# Patient Record
Sex: FEMALE | Race: WHITE | NOT HISPANIC OR LATINO | Employment: UNEMPLOYED | URBAN - METROPOLITAN AREA
[De-identification: names, ages, dates, MRNs, and addresses within clinical notes are randomized per-mention and may not be internally consistent; named-entity substitution may affect disease eponyms.]

---

## 2017-01-06 ENCOUNTER — OFFICE VISIT (OUTPATIENT)
Dept: OBSTETRICS AND GYNECOLOGY | Facility: CLINIC | Age: 22
End: 2017-01-06

## 2017-01-06 VITALS
WEIGHT: 139 LBS | SYSTOLIC BLOOD PRESSURE: 106 MMHG | RESPIRATION RATE: 14 BRPM | BODY MASS INDEX: 25.42 KG/M2 | DIASTOLIC BLOOD PRESSURE: 66 MMHG

## 2017-01-06 DIAGNOSIS — Z98.890 POST-OPERATIVE STATE: Primary | ICD-10-CM

## 2017-01-06 PROCEDURE — 99024 POSTOP FOLLOW-UP VISIT: CPT | Performed by: OBSTETRICS & GYNECOLOGY

## 2017-01-06 NOTE — PROGRESS NOTES
Subjective   Chief Complaint   Patient presents with   • Post-op     Tatianna Dillon is a 21 y.o. year old  presenting to be seen for her post-operative visit.  Currently she reports no problems with eating, bowel movements, voiding, or wound drainage and pain is well controlled.    There was no pathology result associated with Tatianna's recent procedure.      OTHER COMPLAINTS:  none    The following portions of the patient's history were reviewed and updated as appropriate:problem list, current medications, allergies and past surgical history       Objective   Visit Vitals   • /66   • Resp 14   • Wt 139 lb (63 kg)   • LMP 2017 (Exact Date)   • BMI 25.42 kg/m2       General:  well developed; well nourished  no acute distress   Abdomen: soft, non-tender; no masses  no umbilical or inginual hernias are present  no hepato-splenomegaly  incision is clean, dry, intact and without drainage   Pelvis: Not performed.          Assessment   1. S/P laparoscopy and lysis of adhesions - suspect prior ASx chlamydia     Plan   1. May return to full activity with no restrictions  2. Meds were prescribed - no  3. Follow up PRN or 2017 for annual exam       This note was electronically signed.    Chacorta Romero M.D.  2017

## 2017-01-06 NOTE — MR AVS SNAPSHOT
Tatianna Dillon   2017 9:10 AM   Office Visit    Dept Phone:  [phone]   Encounter #:  [id number]    Provider:  Chacorta Romero MD   Department:  [facility]                Your Full Care Plan              Your Updated Medication List          This list is accurate as of: 17 10:09 AM.  Always use your most recent med list.                amphetamine-dextroamphetamine 10 MG tablet   Commonly known as:  ADDERALL               You Were Diagnosed With        Codes Comments    Post-operative state    -  Primary ICD-10-CM: Z98.890  ICD-9-CM: V45.89       Instructions     None    Patient Instructions History      Upcoming Appointments     Visit Type Date Time Department    POST-OP 2017  9:10 AM [facility]    [facility]      MyChart Signup     [facility] [employer] allows you to send messages to your doctor, view your test results, renew your prescriptions, schedule appointments, and more. To sign up, go to [employer] and click on the Sign Up Now link in the New User? box. Enter your [employer] Activation Code exactly as it appears below along with the last four digits of your Social Security Number and your Date of Birth () to complete the sign-up process. If you do not sign up before the expiration date, you must request a new code.    [employer] Activation Code: [email or web address]  Expires: 2017 10:09 AM    If you have questions, you can email [email or web address] or call [phone] to talk to our [employer] staff. Remember, [employer] is NOT to be used for urgent needs. For medical emergencies, dial 911.               Other Info from Your Visit           Your Appointments     Nov 15, 2017  9:10 AM EST   Annual with Chacorta Romero MD   [facility] (--)    [address] [address]      [phone]              Allergies     No Known Allergies      Reason for Visit     Post-op           Vital Signs     Blood Pressure Respirations Weight Last Menstrual Period Body Mass Index Smoking Status    106/66 14 139 lb (63 kg) 01/03/2017 (Exact Date) 25.42 kg/m2 Smoker, Current Status Unknown      Problems and Diagnoses Noted     Postoperative state    -  Primary      No Longer an Issue     Menorrhagia with regular cycle

## 2017-01-30 ENCOUNTER — TELEPHONE (OUTPATIENT)
Dept: OBSTETRICS AND GYNECOLOGY | Facility: CLINIC | Age: 22
End: 2017-01-30

## 2017-01-30 ENCOUNTER — LAB (OUTPATIENT)
Dept: LAB | Facility: HOSPITAL | Age: 22
End: 2017-01-30

## 2017-01-30 DIAGNOSIS — N91.2 AMENORRHEA: Primary | ICD-10-CM

## 2017-01-30 DIAGNOSIS — O20.0 THREATENED MISCARRIAGE: Primary | ICD-10-CM

## 2017-01-30 DIAGNOSIS — N91.2 AMENORRHEA: ICD-10-CM

## 2017-01-30 LAB — HCG INTACT+B SERPL-ACNC: 8 MIU/ML

## 2017-01-30 PROCEDURE — 36415 COLL VENOUS BLD VENIPUNCTURE: CPT

## 2017-01-30 PROCEDURE — 84702 CHORIONIC GONADOTROPIN TEST: CPT | Performed by: OBSTETRICS & GYNECOLOGY

## 2017-01-30 NOTE — TELEPHONE ENCOUNTER
----- Message from Coretta Calvin sent at 1/30/2017 11:25 AM EST -----  Regarding: Beta HCG order  Contact: [phone]  Pt called earlier regarding orders to come to lab for Beta HCG today due to high risk of tubal pregnancy,  she is on her way to  lab now and I didn't see order placed yet.     [phone] pt states LMP 01/04/17 had 2 positive pregnancy test yesterday and a negative test on Saturday.  Requesting blood work. Beta HCG ordered

## 2017-02-02 ENCOUNTER — TELEPHONE (OUTPATIENT)
Dept: OBSTETRICS AND GYNECOLOGY | Facility: CLINIC | Age: 22
End: 2017-02-02

## 2017-02-02 ENCOUNTER — LAB (OUTPATIENT)
Dept: LAB | Facility: HOSPITAL | Age: 22
End: 2017-02-02

## 2017-02-02 DIAGNOSIS — O20.0 THREATENED MISCARRIAGE: Primary | ICD-10-CM

## 2017-02-02 DIAGNOSIS — O20.0 THREATENED MISCARRIAGE: ICD-10-CM

## 2017-02-02 LAB — HCG INTACT+B SERPL-ACNC: 25 MIU/ML

## 2017-02-02 PROCEDURE — 36415 COLL VENOUS BLD VENIPUNCTURE: CPT

## 2017-02-02 PROCEDURE — 84702 CHORIONIC GONADOTROPIN TEST: CPT | Performed by: OBSTETRICS & GYNECOLOGY

## 2017-02-02 NOTE — TELEPHONE ENCOUNTER
Spoke with the patient.  She is only one day past her last menstrual period.  Given her prior history of pelvic adhesive disease, I think we must be careful about ectopic pregnancy.  I've asked her to come back next Monday or Tuesday to repeat her hCG levels.  Assuming these are rising nicely hopefully an ultrasound in about 2 weeks we'll see evidence of an intrauterine pregnancy.  If she has issues with pain or bleeding, I've asked her to contact us immediately so further workup could be done.

## 2017-02-06 ENCOUNTER — APPOINTMENT (OUTPATIENT)
Dept: ULTRASOUND IMAGING | Facility: HOSPITAL | Age: 22
End: 2017-02-06

## 2017-02-06 ENCOUNTER — HOSPITAL ENCOUNTER (EMERGENCY)
Facility: HOSPITAL | Age: 22
Discharge: HOME | End: 2017-02-06
Attending: EMERGENCY MEDICINE | Admitting: EMERGENCY MEDICINE

## 2017-02-06 VITALS
BODY MASS INDEX: 24.84 KG/M2 | SYSTOLIC BLOOD PRESSURE: 98 MMHG | OXYGEN SATURATION: 100 % | HEART RATE: 63 BPM | RESPIRATION RATE: 16 BRPM | DIASTOLIC BLOOD PRESSURE: 62 MMHG | TEMPERATURE: 98.7 F | HEIGHT: 62 IN | WEIGHT: 135 LBS

## 2017-02-06 DIAGNOSIS — R10.32 ACUTE ABDOMINAL PAIN IN LEFT LOWER QUADRANT: Primary | ICD-10-CM

## 2017-02-06 DIAGNOSIS — R31.9 HEMATURIA: ICD-10-CM

## 2017-02-06 DIAGNOSIS — Z3A.01 LESS THAN 8 WEEKS GESTATION OF PREGNANCY: ICD-10-CM

## 2017-02-06 LAB
ALBUMIN SERPL-MCNC: 4.3 G/DL (ref 3.2–4.8)
ALBUMIN/GLOB SERPL: 1.8 G/DL (ref 1.5–2.5)
ALP SERPL-CCNC: 88 U/L (ref 25–100)
ALT SERPL W P-5'-P-CCNC: 20 U/L (ref 7–40)
ANION GAP SERPL CALCULATED.3IONS-SCNC: 3 MMOL/L (ref 3–11)
AST SERPL-CCNC: 22 U/L (ref 0–33)
B-HCG UR QL: NEGATIVE
BACTERIA UR QL AUTO: ABNORMAL /HPF
BASOPHILS # BLD AUTO: 0.03 10*3/MM3 (ref 0–0.2)
BASOPHILS NFR BLD AUTO: 0.4 % (ref 0–1)
BILIRUB SERPL-MCNC: 0.4 MG/DL (ref 0.3–1.2)
BILIRUB UR QL STRIP: NEGATIVE
BUN BLD-MCNC: 13 MG/DL (ref 9–23)
BUN/CREAT SERPL: 16.3 (ref 7–25)
CALCIUM SPEC-SCNC: 9.4 MG/DL (ref 8.7–10.4)
CHLORIDE SERPL-SCNC: 108 MMOL/L (ref 99–109)
CLARITY UR: ABNORMAL
CO2 SERPL-SCNC: 28 MMOL/L (ref 20–31)
COLOR UR: YELLOW
CREAT BLD-MCNC: 0.8 MG/DL (ref 0.6–1.3)
DEPRECATED RDW RBC AUTO: 42.8 FL (ref 37–54)
EOSINOPHIL # BLD AUTO: 0.11 10*3/MM3 (ref 0.1–0.3)
EOSINOPHIL NFR BLD AUTO: 1.6 % (ref 0–3)
ERYTHROCYTE [DISTWIDTH] IN BLOOD BY AUTOMATED COUNT: 12.4 % (ref 11.3–14.5)
GFR SERPL CREATININE-BSD FRML MDRD: 89 ML/MIN/1.73
GLOBULIN UR ELPH-MCNC: 2.4 GM/DL
GLUCOSE BLD-MCNC: 90 MG/DL (ref 70–100)
GLUCOSE UR STRIP-MCNC: NEGATIVE MG/DL
HCG INTACT+B SERPL-ACNC: 75 MIU/ML
HCT VFR BLD AUTO: 39.6 % (ref 34.5–44)
HGB BLD-MCNC: 13.1 G/DL (ref 11.5–15.5)
HGB UR QL STRIP.AUTO: ABNORMAL
HOLD SPECIMEN: NORMAL
HOLD SPECIMEN: NORMAL
HYALINE CASTS UR QL AUTO: ABNORMAL /LPF
IMM GRANULOCYTES # BLD: 0.01 10*3/MM3 (ref 0–0.03)
IMM GRANULOCYTES NFR BLD: 0.1 % (ref 0–0.6)
INTERNAL NEGATIVE CONTROL: NEGATIVE
INTERNAL POSITIVE CONTROL: POSITIVE
KETONES UR QL STRIP: NEGATIVE
LEUKOCYTE ESTERASE UR QL STRIP.AUTO: ABNORMAL
LIPASE SERPL-CCNC: 42 U/L (ref 6–51)
LYMPHOCYTES # BLD AUTO: 2.41 10*3/MM3 (ref 0.6–4.8)
LYMPHOCYTES NFR BLD AUTO: 34.6 % (ref 24–44)
Lab: NORMAL
MCH RBC QN AUTO: 31.3 PG (ref 27–31)
MCHC RBC AUTO-ENTMCNC: 33.1 G/DL (ref 32–36)
MCV RBC AUTO: 94.5 FL (ref 80–99)
MONOCYTES # BLD AUTO: 0.81 10*3/MM3 (ref 0–1)
MONOCYTES NFR BLD AUTO: 11.6 % (ref 0–12)
NEUTROPHILS # BLD AUTO: 3.59 10*3/MM3 (ref 1.5–8.3)
NEUTROPHILS NFR BLD AUTO: 51.7 % (ref 41–71)
NITRITE UR QL STRIP: NEGATIVE
PH UR STRIP.AUTO: 5.5 [PH] (ref 5–8)
PLATELET # BLD AUTO: 215 10*3/MM3 (ref 150–450)
PMV BLD AUTO: 11 FL (ref 6–12)
POTASSIUM BLD-SCNC: 3.8 MMOL/L (ref 3.5–5.5)
PROT SERPL-MCNC: 6.7 G/DL (ref 5.7–8.2)
PROT UR QL STRIP: NEGATIVE
RBC # BLD AUTO: 4.19 10*6/MM3 (ref 3.89–5.14)
RBC # UR: ABNORMAL /HPF
REF LAB TEST METHOD: ABNORMAL
SODIUM BLD-SCNC: 139 MMOL/L (ref 132–146)
SP GR UR STRIP: 1.02 (ref 1–1.03)
SQUAMOUS #/AREA URNS HPF: ABNORMAL /HPF
UROBILINOGEN UR QL STRIP: ABNORMAL
WBC NRBC COR # BLD: 6.96 10*3/MM3 (ref 3.5–10.8)
WBC UR QL AUTO: ABNORMAL /HPF
WHOLE BLOOD HOLD SPECIMEN: NORMAL
WHOLE BLOOD HOLD SPECIMEN: NORMAL

## 2017-02-06 PROCEDURE — 76775 US EXAM ABDO BACK WALL LIM: CPT

## 2017-02-06 PROCEDURE — 99284 EMERGENCY DEPT VISIT MOD MDM: CPT

## 2017-02-06 PROCEDURE — 81001 URINALYSIS AUTO W/SCOPE: CPT | Performed by: EMERGENCY MEDICINE

## 2017-02-06 PROCEDURE — 80053 COMPREHEN METABOLIC PANEL: CPT | Performed by: EMERGENCY MEDICINE

## 2017-02-06 PROCEDURE — 96374 THER/PROPH/DIAG INJ IV PUSH: CPT

## 2017-02-06 PROCEDURE — 83690 ASSAY OF LIPASE: CPT | Performed by: EMERGENCY MEDICINE

## 2017-02-06 PROCEDURE — 87086 URINE CULTURE/COLONY COUNT: CPT | Performed by: EMERGENCY MEDICINE

## 2017-02-06 PROCEDURE — 76817 TRANSVAGINAL US OBSTETRIC: CPT

## 2017-02-06 PROCEDURE — 84702 CHORIONIC GONADOTROPIN TEST: CPT | Performed by: EMERGENCY MEDICINE

## 2017-02-06 PROCEDURE — 85025 COMPLETE CBC W/AUTO DIFF WBC: CPT | Performed by: EMERGENCY MEDICINE

## 2017-02-06 PROCEDURE — 25010000002 PROMETHAZINE PER 50 MG: Performed by: EMERGENCY MEDICINE

## 2017-02-06 RX ORDER — SODIUM CHLORIDE 0.9 % (FLUSH) 0.9 %
10 SYRINGE (ML) INJECTION AS NEEDED
Status: DISCONTINUED | OUTPATIENT
Start: 2017-02-06 | End: 2017-02-06 | Stop reason: HOSPADM

## 2017-02-06 RX ORDER — ACETAMINOPHEN 500 MG
1000 TABLET ORAL ONCE
Status: COMPLETED | OUTPATIENT
Start: 2017-02-06 | End: 2017-02-06

## 2017-02-06 RX ORDER — PROMETHAZINE HYDROCHLORIDE 25 MG/ML
6.25 INJECTION, SOLUTION INTRAMUSCULAR; INTRAVENOUS ONCE
Status: COMPLETED | OUTPATIENT
Start: 2017-02-06 | End: 2017-02-06

## 2017-02-06 RX ORDER — PROMETHAZINE HYDROCHLORIDE 25 MG/1
25 TABLET ORAL EVERY 8 HOURS PRN
Qty: 15 TABLET | Refills: 0 | Status: SHIPPED | OUTPATIENT
Start: 2017-02-06 | End: 2017-07-13

## 2017-02-06 RX ADMIN — PROMETHAZINE HYDROCHLORIDE 6.25 MG: 25 INJECTION INTRAMUSCULAR; INTRAVENOUS at 06:59

## 2017-02-06 RX ADMIN — ACETAMINOPHEN 1000 MG: 500 TABLET, FILM COATED ORAL at 07:01

## 2017-02-06 NOTE — ED PROVIDER NOTES
Subjective   HPI Comments: This is a pleasant .  She is a patient of Dr. Kumar.  She had a pregnancy test on February 2 and her hCG was 25 at that time.    She comes to the emergency room today with fairly abrupt onset of colicky left mid abdominal pain that started after urination this morning.  The pain comes and goes and radiates to her back and she's never had a pain like it before.  If she walks around or bend over it seems to make it better.  She has not had any fevers or chills associated with it but she said she did feel warm yesterday.  She's had irregular bowel movements and suffers occasionally from constipation.  She has had no dysuria.  Her last period was in January and was nonpainful.  Her last intercourse was last night and was nonpainful.  She's had no trauma.  She's had no rash.    She was seen here in October 2016 with abdominal pain. At that time a CT of the abdomen and pelvis were negative and an ultrasound of the ovaries showed small follicular cyst.  She was diagnosed with UTI.  She said this pain is different.    Due to persistent painful menses she underwent exploratory laparoscopy on December 20 by Dr. Kumar that time and was found have adhesions thought to be secondary to previous infections.  She had lysis of adhesions since then and had nonpainful menses.  She had no ovarian cyst at that time.        All other systems are reviewed and are negative except as noted above.      History provided by:  Patient      Review of Systems   All other systems reviewed and are negative.      Past Medical History   Diagnosis Date   • Anemia    • Attention deficit hyperactivity disorder (ADHD), combined type 11/10/2016   • Hypothyroid 2006     Resolved after ~ 3 years of Rxs   • Menorrhagia with regular cycle 11/10/2016   • Pelvic adhesive disease 12/2016       No Known Allergies    Past Surgical History   Procedure Laterality Date   • Laparoscopic appendectomy  2011   •  Tonsillectomy and adenoidectomy  2003   • Nasal septum surgery  2008   • Laparoscopic lysis of adhesions  12/20/2016     Findings consistent with chronic PID   • Appendectomy     • Tonsillectomy and adenoidectomy         History reviewed. No pertinent family history.    Social History     Social History   • Marital status:      Spouse name: N/A   • Number of children: N/A   • Years of education: N/A     Social History Main Topics   • Smoking status: Former Smoker     Packs/day: 0.25     Types: Cigarettes     Start date: 2012   • Smokeless tobacco: None   • Alcohol use No   • Drug use: No   • Sexual activity: Yes     Other Topics Concern   • None     Social History Narrative    ** Merged History Encounter **                Objective   Physical Exam   Constitutional: She is oriented to person, place, and time. She appears well-developed and well-nourished.   She is alert and oriented in no acute distress.   HENT:   Head: Normocephalic and atraumatic.   Right Ear: External ear normal.   Left Ear: External ear normal.   Nose: Nose normal.   Eyes: Conjunctivae and EOM are normal. Pupils are equal, round, and reactive to light.   Neck: Normal range of motion. Neck supple. No tracheal deviation present. Thyromegaly present.   Cardiovascular: Normal rate, regular rhythm, normal heart sounds and intact distal pulses.    No murmur heard.  Pulmonary/Chest: Effort normal and breath sounds normal.   Abdominal: Soft. Bowel sounds are normal.   She has very slight tenderness in the left mid abdomen without organomegaly masses or guarding.  Her flanks are nontender and without rash or mass.   Musculoskeletal: Normal range of motion. She exhibits no edema or tenderness.   Neurological: She is alert and oriented to person, place, and time. No cranial nerve deficit. Coordination normal.   Skin: Skin is warm and dry.   Psychiatric: She has a normal mood and affect. Her behavior is normal. Judgment and thought content normal.  "  Nursing note and vitals reviewed.      Procedures         ED Course  ED Course      No results found for this or any previous visit (from the past 24 hour(s)).  Note: In addition to lab results from this visit, the labs listed above may include labs taken at another facility or during a different encounter within the last 24 hours. Please correlate lab times with ED admission and discharge times for further clarification of the services performed during this visit.    US Ob Transvaginal   Final Result   No intrauterine pregnancy demonstrated on today's   examination. Findings may suggest very early pregnancy. Continued   beta-hCG levels and transvaginal ultrasound of the pelvis is recommended   for further evaluation. Ovaries are both grossly unremarkable with no   adnexal mass present.       D:  02/06/2017   E:  02/06/2017            This report was finalized on 2/6/2017 9:47 AM by Dr. Millie Marks MD.            Vitals:    02/06/17 0612 02/06/17 0719 02/06/17 0900 02/06/17 0950   BP: 118/71 100/64  98/62   BP Location:       Patient Position:       Pulse: 80 58 57 63   Resp: 16 16 18 16   Temp: 98.9 °F (37.2 °C)   98.7 °F (37.1 °C)   TempSrc: Oral   Oral   SpO2: 99% 100% 97% 100%   Weight: 135 lb (61.2 kg)      Height: 62\" (157.5 cm)        Medications   promethazine (PHENERGAN) injection 6.25 mg (6.25 mg Intravenous Given 2/6/17 0659)   acetaminophen (TYLENOL) tablet 1,000 mg (1,000 mg Oral Given 2/6/17 0701)     ECG/EMG Results (last 24 hours)     ** No results found for the last 24 hours. **                  MDM  Number of Diagnoses or Management Options  Acute abdominal pain in left lower quadrant:   Hematuria:   Less than 8 weeks gestation of pregnancy:   Diagnosis management comments:       On reexamination patient feels improved.  Her abdominal exam is benign.    I reviewed the results of her ultrasound with her which are reassuring. There is no evidence of abnormalities of her kidney or her " uterus or ovaries currently.  She does have a lot of red blood cells in her urine which leads me to think that she may have passed a tiny stone.    She has no evidence of intrauterine pregnancy currently but we would not expect that with a hCG of 75.    I will discharge the patient to home on Tylenol and Phenergan.  She is already on a prenatal vitamin.  I will have her keep her follow-up with Dr. Kumar this week.  She'll return to the ER for recheck if worse in any way.    All are agreeable with the plan.       Amount and/or Complexity of Data Reviewed  Clinical lab tests: reviewed  Tests in the radiology section of CPT®: reviewed    EMR Dragon/Transcription disclaimer:   Much of this encounter note is an electronic transcription/translation of spoken language to printed text. The electronic translation of spoken language may permit erroneous, or at times, nonsensical words or phrases to be inadvertently transcribed; Although I have reviewed the note for such errors, some may still exist.     Final diagnoses:   Acute abdominal pain in left lower quadrant   Hematuria   Less than 8 weeks gestation of pregnancy            Angel Willingham  02/06/17 0919       Atul Alva MD  02/07/17 0934

## 2017-02-08 LAB — BACTERIA SPEC AEROBE CULT: NORMAL

## 2017-02-09 DIAGNOSIS — N73.6: Primary | ICD-10-CM

## 2017-02-09 DIAGNOSIS — O34.81: Primary | ICD-10-CM

## 2017-02-12 ENCOUNTER — DOCUMENTATION (OUTPATIENT)
Dept: OBSTETRICS AND GYNECOLOGY | Facility: CLINIC | Age: 22
End: 2017-02-12

## 2017-02-12 NOTE — PROGRESS NOTES
"Tatianna called yesterday 2/11/2017 at approximately 8 PM.  She was spotting and was told by Dr. Romero that she was high risk for an ectopic pregnancy and was to call if she had any bleeding or spotting .  Not sure why she was high risk.  She had a laparoscopy that showed adhesions.  She is proximally 5 weeks gestation.  She is having absolutely no pain.  She's never been pregnant before.  I'm not sure what made her \"high risk \"for an ectopic pregnancy.  I told her that was spotting she may have a normal pregnancy threatened miscarriage potential an ectopic.  Without any pain would not be that suspicious for an ectopic and certainly we would not operate for spotting.  I told her this should any significant bleeding like a period or significant pain that she should present to the emergency room and that she would not need to call back to do so.  I reassured her that at this point we would not do anything other than follow-up as Dr. Romero had planned.  "

## 2017-02-13 ENCOUNTER — OFFICE VISIT (OUTPATIENT)
Dept: OBSTETRICS AND GYNECOLOGY | Facility: CLINIC | Age: 22
End: 2017-02-13

## 2017-02-13 ENCOUNTER — TELEPHONE (OUTPATIENT)
Dept: OBSTETRICS AND GYNECOLOGY | Facility: CLINIC | Age: 22
End: 2017-02-13

## 2017-02-13 ENCOUNTER — APPOINTMENT (OUTPATIENT)
Dept: LAB | Facility: HOSPITAL | Age: 22
End: 2017-02-13

## 2017-02-13 VITALS — HEIGHT: 62 IN | WEIGHT: 146 LBS | RESPIRATION RATE: 14 BRPM | BODY MASS INDEX: 26.87 KG/M2

## 2017-02-13 DIAGNOSIS — O20.0 THREATENED ABORTION: Primary | ICD-10-CM

## 2017-02-13 DIAGNOSIS — O36.80X0 PREGNANCY OF UNKNOWN ANATOMIC LOCATION: Primary | ICD-10-CM

## 2017-02-13 LAB — HCG INTACT+B SERPL-ACNC: 104 MIU/ML

## 2017-02-13 PROCEDURE — 99213 OFFICE O/P EST LOW 20 MIN: CPT | Performed by: OBSTETRICS & GYNECOLOGY

## 2017-02-13 PROCEDURE — 36415 COLL VENOUS BLD VENIPUNCTURE: CPT | Performed by: OBSTETRICS & GYNECOLOGY

## 2017-02-13 PROCEDURE — 84702 CHORIONIC GONADOTROPIN TEST: CPT | Performed by: OBSTETRICS & GYNECOLOGY

## 2017-02-13 NOTE — PROGRESS NOTES
"Subjective   Chief Complaint   Patient presents with   • Imaging Only     viability     Tatianna Dillon is a 21 y.o. year old  who comes to review her recent testing and discuss next steps.  She's having no issues with bleeding and no issues with pain.    OTHER COMPLAINTS:  none       Objective   Visit Vitals   • Resp 14   • Ht 62\" (157.5 cm)   • Wt 146 lb (66.2 kg)   • LMP 2017 (Exact Date)   • Breastfeeding No   • BMI 26.7 kg/m2       Lab Review   HCG    Imaging   Pelvic ultrasound report and Pelvic ultrasound images independantly reviewed - Reason for small saclike structure in the uterus.  The endometrium has proliferative appearance.  There is no cul-de-sac fluid       Assessment   1. Pregnancy of unknown location with findings most consistent with early intrauterine pregnancy  2. History of prior pelvic inflammatory disease.  Ectopic pregnancy cannot be excluded     Plan   1. Check hCG today.  Recheck ultrasound in 1 week.  2. Ectopic risk factors were discussed.  It was discussed with Tatianna that should she have bleeding or pain this could be a symptom of early ectopic pregnancy.  Should this be the case she is to get to the emergency room immediately for further investigation.  3. Follow up for recheck of U/S 1 week         Total time spent today with Tatianna  was 20 minutes (level 3).  Greater than 50% of the time was spent coordinating care, answering her questions and counseling regarding pathophysiology of her presenting problem along with plans for any diagnositc work-up and treatment.    This note was electronically signed.    Chacorta Romero M.D.  2017    "

## 2017-02-13 NOTE — TELEPHONE ENCOUNTER
----- Message from Coretta Calvin sent at 2/13/2017 11:22 AM EST -----  Regarding: SPOTTING  Contact: [phone]  RAI JUST HAD U/S AND NOW HAVING SPOTTING,  YOU TOLD HER TO CALL IF THIS HAPPENED    For now I think that's fine.  If the spotting increases or she develops pain she should let us know.

## 2017-02-15 ENCOUNTER — LAB (OUTPATIENT)
Dept: LAB | Facility: HOSPITAL | Age: 22
End: 2017-02-15

## 2017-02-15 ENCOUNTER — TELEPHONE (OUTPATIENT)
Dept: OBSTETRICS AND GYNECOLOGY | Facility: CLINIC | Age: 22
End: 2017-02-15

## 2017-02-15 DIAGNOSIS — O20.0 THREATENED ABORTION: Primary | ICD-10-CM

## 2017-02-15 DIAGNOSIS — O20.0 THREATENED ABORTION: ICD-10-CM

## 2017-02-15 LAB — HCG INTACT+B SERPL-ACNC: 52 MIU/ML

## 2017-02-15 PROCEDURE — 84702 CHORIONIC GONADOTROPIN TEST: CPT | Performed by: OBSTETRICS & GYNECOLOGY

## 2017-02-15 PROCEDURE — 36415 COLL VENOUS BLD VENIPUNCTURE: CPT

## 2017-02-15 NOTE — TELEPHONE ENCOUNTER
----- Message from Solange Krueger MA sent at 2/15/2017  1:38 PM EST -----  Pt has started bleeding today with lower back pain. Her u/s is still on the schedule for Monday at 1:30. Please call her

## 2017-03-07 ENCOUNTER — LAB (OUTPATIENT)
Dept: LAB | Facility: HOSPITAL | Age: 22
End: 2017-03-07

## 2017-03-07 ENCOUNTER — TELEPHONE (OUTPATIENT)
Dept: OBSTETRICS AND GYNECOLOGY | Facility: CLINIC | Age: 22
End: 2017-03-07

## 2017-03-07 DIAGNOSIS — O20.0 THREATENED MISCARRIAGE: Primary | ICD-10-CM

## 2017-03-07 DIAGNOSIS — O20.0 THREATENED MISCARRIAGE: ICD-10-CM

## 2017-03-07 LAB — HCG INTACT+B SERPL-ACNC: 31 MIU/ML

## 2017-03-07 PROCEDURE — 36415 COLL VENOUS BLD VENIPUNCTURE: CPT

## 2017-03-07 PROCEDURE — 84702 CHORIONIC GONADOTROPIN TEST: CPT | Performed by: OBSTETRICS & GYNECOLOGY

## 2017-03-07 NOTE — TELEPHONE ENCOUNTER
----- Message from Louise Helm sent at 3/7/2017  2:30 PM EST -----  Contact: [phone]  Had HCG drawn at 11am in [address] [place] [address], wants to know when her results will be back,  Please call her when results are back

## 2017-03-07 NOTE — TELEPHONE ENCOUNTER
----- Message from Louise Helm sent at 3/7/2017  8:39 AM EST -----  Dr Romero patient, had a miscarriage on 2/15/17, had positive pregnancy test yesterday, wonders if its left over hormones or if she is currently pregnant, please call her

## 2017-03-07 NOTE — TELEPHONE ENCOUNTER
Spoke with Tatianna, Pt stated that she had took a pregnancy test  right after she had the miscarriage and it was neg, but took a test yesterday and it was pos. Should Pt come in and have HCG levels drawn

## 2017-03-10 ENCOUNTER — HOSPITAL ENCOUNTER (EMERGENCY)
Facility: HOSPITAL | Age: 22
Discharge: HOME | End: 2017-03-10
Attending: EMERGENCY MEDICINE | Admitting: EMERGENCY MEDICINE

## 2017-03-10 ENCOUNTER — APPOINTMENT (OUTPATIENT)
Dept: CT IMAGING | Facility: HOSPITAL | Age: 22
End: 2017-03-10

## 2017-03-10 ENCOUNTER — APPOINTMENT (OUTPATIENT)
Dept: ULTRASOUND IMAGING | Facility: HOSPITAL | Age: 22
End: 2017-03-10

## 2017-03-10 ENCOUNTER — TELEPHONE (OUTPATIENT)
Dept: OBSTETRICS AND GYNECOLOGY | Facility: CLINIC | Age: 22
End: 2017-03-10

## 2017-03-10 VITALS
BODY MASS INDEX: 25.21 KG/M2 | DIASTOLIC BLOOD PRESSURE: 71 MMHG | SYSTOLIC BLOOD PRESSURE: 102 MMHG | RESPIRATION RATE: 18 BRPM | OXYGEN SATURATION: 98 % | WEIGHT: 137 LBS | HEIGHT: 62 IN | HEART RATE: 60 BPM | TEMPERATURE: 98.4 F

## 2017-03-10 DIAGNOSIS — N93.9 VAGINAL BLEEDING: Primary | ICD-10-CM

## 2017-03-10 DIAGNOSIS — R10.2 PELVIC PAIN: ICD-10-CM

## 2017-03-10 DIAGNOSIS — O00.90 ECTOPIC PREGNANCY, UNSPECIFIED LOCATION, UNSPECIFIED WHETHER INTRAUTERINE PREGNANCY PRESENT: ICD-10-CM

## 2017-03-10 LAB
ABO GROUP BLD: NORMAL
ALBUMIN SERPL-MCNC: 4.6 G/DL (ref 3.2–4.8)
ALBUMIN/GLOB SERPL: 1.6 G/DL (ref 1.5–2.5)
ALP SERPL-CCNC: 84 U/L (ref 25–100)
ALT SERPL W P-5'-P-CCNC: 14 U/L (ref 7–40)
ANION GAP SERPL CALCULATED.3IONS-SCNC: 4 MMOL/L (ref 3–11)
AST SERPL-CCNC: 20 U/L (ref 0–33)
BACTERIA UR QL AUTO: ABNORMAL /HPF
BASOPHILS # BLD AUTO: 0.04 10*3/MM3 (ref 0–0.2)
BASOPHILS NFR BLD AUTO: 0.7 % (ref 0–1)
BILIRUB SERPL-MCNC: 0.6 MG/DL (ref 0.3–1.2)
BILIRUB UR QL STRIP: NEGATIVE
BUN BLD-MCNC: 14 MG/DL (ref 9–23)
BUN/CREAT SERPL: 17.5 (ref 7–25)
CALCIUM SPEC-SCNC: 9.9 MG/DL (ref 8.7–10.4)
CHLORIDE SERPL-SCNC: 102 MMOL/L (ref 99–109)
CLARITY UR: CLEAR
CO2 SERPL-SCNC: 32 MMOL/L (ref 20–31)
COLOR UR: YELLOW
CREAT BLD-MCNC: 0.8 MG/DL (ref 0.6–1.3)
DEPRECATED RDW RBC AUTO: 41.4 FL (ref 37–54)
EOSINOPHIL # BLD AUTO: 0.12 10*3/MM3 (ref 0.1–0.3)
EOSINOPHIL NFR BLD AUTO: 2 % (ref 0–3)
ERYTHROCYTE [DISTWIDTH] IN BLOOD BY AUTOMATED COUNT: 12.1 % (ref 11.3–14.5)
GFR SERPL CREATININE-BSD FRML MDRD: 89 ML/MIN/1.73
GLOBULIN UR ELPH-MCNC: 2.9 GM/DL
GLUCOSE BLD-MCNC: 83 MG/DL (ref 70–100)
GLUCOSE UR STRIP-MCNC: NEGATIVE MG/DL
HCG INTACT+B SERPL-ACNC: 30 MIU/ML
HCT VFR BLD AUTO: 40.4 % (ref 34.5–44)
HGB BLD-MCNC: 13.6 G/DL (ref 11.5–15.5)
HGB UR QL STRIP.AUTO: NEGATIVE
HOLD SPECIMEN: NORMAL
HOLD SPECIMEN: NORMAL
HYALINE CASTS UR QL AUTO: ABNORMAL /LPF
IMM GRANULOCYTES # BLD: 0.02 10*3/MM3 (ref 0–0.03)
IMM GRANULOCYTES NFR BLD: 0.3 % (ref 0–0.6)
KETONES UR QL STRIP: NEGATIVE
LEUKOCYTE ESTERASE UR QL STRIP.AUTO: ABNORMAL
LIPASE SERPL-CCNC: 40 U/L (ref 6–51)
LYMPHOCYTES # BLD AUTO: 2.26 10*3/MM3 (ref 0.6–4.8)
LYMPHOCYTES NFR BLD AUTO: 38.2 % (ref 24–44)
MCH RBC QN AUTO: 31.8 PG (ref 27–31)
MCHC RBC AUTO-ENTMCNC: 33.7 G/DL (ref 32–36)
MCV RBC AUTO: 94.4 FL (ref 80–99)
MONOCYTES # BLD AUTO: 0.61 10*3/MM3 (ref 0–1)
MONOCYTES NFR BLD AUTO: 10.3 % (ref 0–12)
NEUTROPHILS # BLD AUTO: 2.87 10*3/MM3 (ref 1.5–8.3)
NEUTROPHILS NFR BLD AUTO: 48.5 % (ref 41–71)
NITRITE UR QL STRIP: NEGATIVE
NUMBER OF DOSES: NORMAL
PH UR STRIP.AUTO: 7 [PH] (ref 5–8)
PLATELET # BLD AUTO: 224 10*3/MM3 (ref 150–450)
PMV BLD AUTO: 11.2 FL (ref 6–12)
POTASSIUM BLD-SCNC: 4.1 MMOL/L (ref 3.5–5.5)
PROT SERPL-MCNC: 7.5 G/DL (ref 5.7–8.2)
PROT UR QL STRIP: NEGATIVE
RBC # BLD AUTO: 4.28 10*6/MM3 (ref 3.89–5.14)
RBC # UR: ABNORMAL /HPF
REF LAB TEST METHOD: ABNORMAL
RH BLD: POSITIVE
SODIUM BLD-SCNC: 138 MMOL/L (ref 132–146)
SP GR UR STRIP: 1.01 (ref 1–1.03)
SQUAMOUS #/AREA URNS HPF: ABNORMAL /HPF
UROBILINOGEN UR QL STRIP: ABNORMAL
WBC NRBC COR # BLD: 5.92 10*3/MM3 (ref 3.5–10.8)
WBC UR QL AUTO: ABNORMAL /HPF
WHOLE BLOOD HOLD SPECIMEN: NORMAL
WHOLE BLOOD HOLD SPECIMEN: NORMAL

## 2017-03-10 PROCEDURE — 85025 COMPLETE CBC W/AUTO DIFF WBC: CPT | Performed by: EMERGENCY MEDICINE

## 2017-03-10 PROCEDURE — 25010000002 PROMETHAZINE PER 50 MG: Performed by: EMERGENCY MEDICINE

## 2017-03-10 PROCEDURE — 80053 COMPREHEN METABOLIC PANEL: CPT | Performed by: EMERGENCY MEDICINE

## 2017-03-10 PROCEDURE — 96361 HYDRATE IV INFUSION ADD-ON: CPT

## 2017-03-10 PROCEDURE — 96375 TX/PRO/DX INJ NEW DRUG ADDON: CPT

## 2017-03-10 PROCEDURE — 74177 CT ABD & PELVIS W/CONTRAST: CPT

## 2017-03-10 PROCEDURE — 84702 CHORIONIC GONADOTROPIN TEST: CPT | Performed by: EMERGENCY MEDICINE

## 2017-03-10 PROCEDURE — 83690 ASSAY OF LIPASE: CPT | Performed by: EMERGENCY MEDICINE

## 2017-03-10 PROCEDURE — 81001 URINALYSIS AUTO W/SCOPE: CPT | Performed by: EMERGENCY MEDICINE

## 2017-03-10 PROCEDURE — 76817 TRANSVAGINAL US OBSTETRIC: CPT

## 2017-03-10 PROCEDURE — 99283 EMERGENCY DEPT VISIT LOW MDM: CPT

## 2017-03-10 PROCEDURE — 0 IOPAMIDOL 61 % SOLUTION: Performed by: EMERGENCY MEDICINE

## 2017-03-10 PROCEDURE — 96374 THER/PROPH/DIAG INJ IV PUSH: CPT

## 2017-03-10 PROCEDURE — 25010000002 MORPHINE PER 10 MG: Performed by: EMERGENCY MEDICINE

## 2017-03-10 RX ORDER — SODIUM CHLORIDE 0.9 % (FLUSH) 0.9 %
10 SYRINGE (ML) INJECTION AS NEEDED
Status: DISCONTINUED | OUTPATIENT
Start: 2017-03-10 | End: 2017-03-10 | Stop reason: HOSPADM

## 2017-03-10 RX ORDER — SODIUM CHLORIDE 9 MG/ML
125 INJECTION, SOLUTION INTRAVENOUS CONTINUOUS
Status: DISCONTINUED | OUTPATIENT
Start: 2017-03-10 | End: 2017-03-10 | Stop reason: HOSPADM

## 2017-03-10 RX ORDER — PROMETHAZINE HYDROCHLORIDE 25 MG/ML
12.5 INJECTION, SOLUTION INTRAMUSCULAR; INTRAVENOUS ONCE
Status: COMPLETED | OUTPATIENT
Start: 2017-03-10 | End: 2017-03-10

## 2017-03-10 RX ORDER — ONDANSETRON 4 MG/1
4 TABLET, FILM COATED ORAL EVERY 6 HOURS PRN
Qty: 8 TABLET | Refills: 0 | Status: SHIPPED | OUTPATIENT
Start: 2017-03-10 | End: 2017-07-13

## 2017-03-10 RX ORDER — HYDROCODONE BITARTRATE AND ACETAMINOPHEN 5; 325 MG/1; MG/1
2 TABLET ORAL ONCE
Status: COMPLETED | OUTPATIENT
Start: 2017-03-10 | End: 2017-03-10

## 2017-03-10 RX ORDER — HYDROCODONE BITARTRATE AND ACETAMINOPHEN 5; 325 MG/1; MG/1
1-2 TABLET ORAL EVERY 6 HOURS PRN
Qty: 10 TABLET | Refills: 0 | Status: SHIPPED | OUTPATIENT
Start: 2017-03-10 | End: 2017-07-13

## 2017-03-10 RX ORDER — MORPHINE SULFATE 4 MG/ML
4 INJECTION, SOLUTION INTRAMUSCULAR; INTRAVENOUS ONCE
Status: COMPLETED | OUTPATIENT
Start: 2017-03-10 | End: 2017-03-10

## 2017-03-10 RX ADMIN — SODIUM CHLORIDE 125 ML/HR: 9 INJECTION, SOLUTION INTRAVENOUS at 18:00

## 2017-03-10 RX ADMIN — SODIUM CHLORIDE 1000 ML: 9 INJECTION, SOLUTION INTRAVENOUS at 14:50

## 2017-03-10 RX ADMIN — PROMETHAZINE HYDROCHLORIDE 12.5 MG: 25 INJECTION, SOLUTION INTRAMUSCULAR; INTRAVENOUS at 14:52

## 2017-03-10 RX ADMIN — IOPAMIDOL 100 ML: 612 INJECTION, SOLUTION INTRAVENOUS at 18:35

## 2017-03-10 RX ADMIN — MORPHINE SULFATE 4 MG: 4 INJECTION, SOLUTION INTRAMUSCULAR; INTRAVENOUS at 14:51

## 2017-03-10 RX ADMIN — HYDROCODONE BITARTRATE AND ACETAMINOPHEN 2 TABLET: 5; 325 TABLET ORAL at 20:14

## 2017-03-10 NOTE — ED PROVIDER NOTES
Subjective   HPI Comments: Tatianna Dillon is a 21 y.o. female presenting to the ED with c/o vaginal bleeding. Miss Dillon reports that she had a miscarriage one month ago, and since then she has been having persistent vaginal bleeding. She reports that it was significantly worsened over the past two days, and was advised to present to the ED by Dr. Romero. She reports that she has been dizzy, lightheaded, and nauseous over the past couple of days as well. She additionally complains of pain in her lower abdomen. She reports having large clots with her bleeding as well since yesterday. Denies any fevers, dysuria, or chills. No other complaints at this time.    Patient is a 21 y.o. female presenting with vaginal bleeding.   History provided by:  Patient  Vaginal Bleeding   Quality:  Heavier than menses and clots  Severity:  Moderate  Onset quality:  Gradual  Duration:  1 month  Timing:  Constant  Progression:  Worsening  Chronicity:  New  Possible pregnancy: miscarriage.    Context comment:  Miscarriage  Relieved by:  Nothing  Worsened by:  Nothing  Ineffective treatments:  None tried  Associated symptoms: abdominal pain, dizziness and nausea    Associated symptoms: no back pain, no dysuria, no fever and no vaginal discharge        Review of Systems   Constitutional: Negative for chills and fever.   HENT: Negative for rhinorrhea.    Respiratory: Negative for cough and shortness of breath.    Cardiovascular: Negative for chest pain and leg swelling.   Gastrointestinal: Positive for abdominal pain and nausea. Negative for diarrhea and vomiting.   Genitourinary: Positive for pelvic pain and vaginal bleeding. Negative for dysuria and vaginal discharge.   Musculoskeletal: Negative for back pain, myalgias and neck pain.   Skin: Negative for color change.   Neurological: Positive for dizziness and light-headedness. Negative for syncope and weakness.   All other systems reviewed and are negative.      Past Medical History    Diagnosis Date   • Anemia    • Attention deficit hyperactivity disorder (ADHD), combined type 11/10/2016   • Hypothyroid 2006     Resolved after ~ 3 years of Rxs   • Menorrhagia with regular cycle 11/10/2016   • Pelvic adhesive disease 12/2016       No Known Allergies    Past Surgical History   Procedure Laterality Date   • Laparoscopic appendectomy  2011   • Tonsillectomy and adenoidectomy  2003   • Nasal septum surgery  2008   • Laparoscopic lysis of adhesions  12/20/2016     Findings consistent with chronic PID       Family History   Problem Relation Age of Onset   • Family history unknown: Yes       Social History     Social History   • Marital status:      Spouse name: N/A   • Number of children: N/A   • Years of education: N/A     Social History Main Topics   • Smoking status: Former Smoker     Packs/day: 0.25     Types: Cigarettes     Start date: 2012   • Smokeless tobacco: None   • Alcohol use No   • Drug use: No   • Sexual activity: Yes     Other Topics Concern   • None     Social History Narrative    ** Merged History Encounter **              Objective   Physical Exam   Constitutional: She is oriented to person, place, and time. She appears well-developed and well-nourished. No distress.   HENT:   Head: Normocephalic and atraumatic.   Eyes: Conjunctivae and EOM are normal.   Neck: Normal range of motion. Neck supple.   Cardiovascular: Normal rate, regular rhythm, normal heart sounds and intact distal pulses.    Pulmonary/Chest: Effort normal and breath sounds normal. No respiratory distress. She exhibits no tenderness.   Abdominal: Soft. Bowel sounds are normal. There is tenderness (ttp. remainder of abdomen is nontender) in the right lower quadrant and suprapubic area. There is no rebound and no guarding.   Genitourinary:   Genitourinary Comments: Difficult to see cervical os, but no active bleeding is noted. There is blood in the vaginal vault.   Musculoskeletal: Normal range of motion. She  exhibits no edema or tenderness.   Neurological: She is alert and oriented to person, place, and time.   Skin: Skin is warm and dry. No erythema.   Psychiatric: She has a normal mood and affect. Her behavior is normal.   Nursing note and vitals reviewed.      Procedures         ED Course  ED Course               Recent Results (from the past 24 hour(s))   Comprehensive Metabolic Panel    Collection Time: 03/10/17  2:53 PM   Result Value Ref Range    Glucose 83 70 - 100 mg/dL    BUN 14 9 - 23 mg/dL    Creatinine 0.80 0.60 - 1.30 mg/dL    Sodium 138 132 - 146 mmol/L    Potassium 4.1 3.5 - 5.5 mmol/L    Chloride 102 99 - 109 mmol/L    CO2 32.0 (H) 20.0 - 31.0 mmol/L    Calcium 9.9 8.7 - 10.4 mg/dL    Total Protein 7.5 5.7 - 8.2 g/dL    Albumin 4.60 3.20 - 4.80 g/dL    ALT (SGPT) 14 7 - 40 U/L    AST (SGOT) 20 0 - 33 U/L    Alkaline Phosphatase 84 25 - 100 U/L    Total Bilirubin 0.6 0.3 - 1.2 mg/dL    eGFR Non African Amer 89 >60 mL/min/1.73    Globulin 2.9 gm/dL    A/G Ratio 1.6 1.5 - 2.5 g/dL    BUN/Creatinine Ratio 17.5 7.0 - 25.0    Anion Gap 4.0 3.0 - 11.0 mmol/L   hCG, Quantitative, Pregnancy    Collection Time: 03/10/17  2:53 PM   Result Value Ref Range    HCG Quantitative 30.00 mIU/mL    RhIg Evaluation    Collection Time: 03/10/17  2:53 PM   Result Value Ref Range    ABO Type O     RH type Positive    Lipase    Collection Time: 03/10/17  2:53 PM   Result Value Ref Range    Lipase 40 6 - 51 U/L   Doses of Rh Immune Globulin    Collection Time: 03/10/17  2:53 PM   Result Value Ref Range    Number of Doses       RhIg is not indicated due to the patient's Rh status   Light Blue Top    Collection Time: 03/10/17  2:53 PM   Result Value Ref Range    Extra Tube hold for add-on    Green Top (Gel)    Collection Time: 03/10/17  2:53 PM   Result Value Ref Range    Extra Tube Hold for add-ons.    Lavender Top    Collection Time: 03/10/17  2:53 PM   Result Value Ref Range    Extra Tube hold for add-on    Gold Top -  SST    Collection Time: 03/10/17  2:53 PM   Result Value Ref Range    Extra Tube Hold for add-ons.    CBC Auto Differential    Collection Time: 03/10/17  2:53 PM   Result Value Ref Range    WBC 5.92 3.50 - 10.80 10*3/mm3    RBC 4.28 3.89 - 5.14 10*6/mm3    Hemoglobin 13.6 11.5 - 15.5 g/dL    Hematocrit 40.4 34.5 - 44.0 %    MCV 94.4 80.0 - 99.0 fL    MCH 31.8 (H) 27.0 - 31.0 pg    MCHC 33.7 32.0 - 36.0 g/dL    RDW 12.1 11.3 - 14.5 %    RDW-SD 41.4 37.0 - 54.0 fl    MPV 11.2 6.0 - 12.0 fL    Platelets 224 150 - 450 10*3/mm3    Neutrophil % 48.5 41.0 - 71.0 %    Lymphocyte % 38.2 24.0 - 44.0 %    Monocyte % 10.3 0.0 - 12.0 %    Eosinophil % 2.0 0.0 - 3.0 %    Basophil % 0.7 0.0 - 1.0 %    Immature Grans % 0.3 0.0 - 0.6 %    Neutrophils, Absolute 2.87 1.50 - 8.30 10*3/mm3    Lymphocytes, Absolute 2.26 0.60 - 4.80 10*3/mm3    Monocytes, Absolute 0.61 0.00 - 1.00 10*3/mm3    Eosinophils, Absolute 0.12 0.10 - 0.30 10*3/mm3    Basophils, Absolute 0.04 0.00 - 0.20 10*3/mm3    Immature Grans, Absolute 0.02 0.00 - 0.03 10*3/mm3   Urinalysis With / Culture If Indicated    Collection Time: 03/10/17  4:53 PM   Result Value Ref Range    Color, UA Yellow Yellow, Straw    Appearance, UA Clear Clear    pH, UA 7.0 5.0 - 8.0    Specific Gravity, UA 1.010 1.001 - 1.030    Glucose, UA Negative Negative    Ketones, UA Negative Negative    Bilirubin, UA Negative Negative    Blood, UA Negative Negative    Protein, UA Negative Negative    Leuk Esterase, UA Trace (A) Negative    Nitrite, UA Negative Negative    Urobilinogen, UA 0.2 E.U./dL 0.2 - 1.0 E.U./dL   Urinalysis, Microscopic Only    Collection Time: 03/10/17  4:53 PM   Result Value Ref Range    RBC, UA None Seen None Seen, 0-2 /HPF    WBC, UA 0-2 (A) None Seen /HPF    Bacteria, UA None Seen None Seen, Trace /HPF    Squamous Epithelial Cells, UA 0-2 None Seen, 0-2 /HPF    Hyaline Casts, UA None Seen 0 - 6 /LPF    Methodology Automated Microscopy      Note: In addition to lab results  "from this visit, the labs listed above may include labs taken at another facility or during a different encounter within the last 24 hours. Please correlate lab times with ED admission and discharge times for further clarification of the services performed during this visit.    US Ob Transvaginal    (Results Pending)   CT Abdomen Pelvis With Contrast    (Results Pending)     Vitals:    03/10/17 1308   BP: 133/81   BP Location: Left arm   Patient Position: Sitting   Pulse: 96   Resp: 16   Temp: 97.9 °F (36.6 °C)   TempSrc: Oral   SpO2: 99%   Weight: 137 lb (62.1 kg)   Height: 62\" (157.5 cm)     Medications   sodium chloride 0.9 % flush 10 mL (not administered)   sodium chloride 0.9 % flush 10 mL (not administered)   sodium chloride 0.9 % infusion (125 mL/hr Intravenous New Bag 3/10/17 1800)   sodium chloride 0.9 % bolus 1,000 mL (0 mL Intravenous Stopped 3/10/17 1603)   Morphine sulfate (PF) injection 4 mg (4 mg Intravenous Given 3/10/17 1451)   promethazine (PHENERGAN) injection 12.5 mg (12.5 mg Intravenous Given 3/10/17 1452)   iopamidol (ISOVUE-300) 61 % injection 100 mL (100 mL Intravenous Given 3/10/17 1835)     ECG/EMG Results (last 24 hours)     ** No results found for the last 24 hours. **        Discussed case including ultrasound results with Dr. Romero.  He will continue to monitor for possible ectopic pregnancy and monitor HCG levels.  Rec'd pain control, follow up early next week, and return if symptoms worsen.  Pt is comfortable with this plan.      MDM  Number of Diagnoses or Management Options  Ectopic pregnancy, unspecified location, unspecified whether intrauterine pregnancy present:   Pelvic pain:   Vaginal bleeding:      Amount and/or Complexity of Data Reviewed  Clinical lab tests: ordered and reviewed  Tests in the radiology section of CPT®: reviewed and ordered  Decide to obtain previous medical records or to obtain history from someone other than the patient: yes  Discuss the patient with " other providers: yes  Independent visualization of images, tracings, or specimens: yes    Patient Progress  Patient progress: stable      Final diagnoses:   Vaginal bleeding   Ectopic pregnancy, unspecified location, unspecified whether intrauterine pregnancy present   Pelvic pain       Documentation assistance provided by carina Byrd.  Information recorded by the scribe was done at my direction and has been verified and validated by me.     Srinivasan Byrd  03/10/17 1433       Srinivasan Byrd  03/10/17 1712       Srinivasan Byrd  03/10/17 2001       Rohan Jain DO  03/12/17 0949

## 2017-03-10 NOTE — TELEPHONE ENCOUNTER
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

## 2017-03-10 NOTE — TELEPHONE ENCOUNTER
Spoke with patient.  Expect it the beginning of the miscarriage.  If getting weak or dizzy, may need to come to be seen in ER.

## 2017-03-11 NOTE — DISCHARGE INSTRUCTIONS
Follow up with OBGYN on Monday.  Return to the Emergency Department sooner if symptoms worsen or any other concerns arise.

## 2017-03-13 ENCOUNTER — TELEPHONE (OUTPATIENT)
Dept: OBSTETRICS AND GYNECOLOGY | Facility: CLINIC | Age: 22
End: 2017-03-13

## 2017-03-13 NOTE — TELEPHONE ENCOUNTER
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

## 2017-03-13 NOTE — TELEPHONE ENCOUNTER
Leeanne,    I did speak with her on Friday.  Subsequent to that she did go to the ER for evaluation and was clinically stable.    Can you call her to make sure she is aware that we still need to follow her hCGs weekly down to 0?

## 2017-03-14 ENCOUNTER — LAB (OUTPATIENT)
Dept: LAB | Facility: HOSPITAL | Age: 22
End: 2017-03-14

## 2017-03-14 DIAGNOSIS — O20.0 THREATENED MISCARRIAGE: ICD-10-CM

## 2017-03-14 DIAGNOSIS — O20.0 THREATENED MISCARRIAGE: Primary | ICD-10-CM

## 2017-03-14 LAB — HCG INTACT+B SERPL-ACNC: 22 MIU/ML

## 2017-03-14 PROCEDURE — 84702 CHORIONIC GONADOTROPIN TEST: CPT | Performed by: OBSTETRICS & GYNECOLOGY

## 2017-03-14 PROCEDURE — 36415 COLL VENOUS BLD VENIPUNCTURE: CPT

## 2017-03-21 ENCOUNTER — LAB (OUTPATIENT)
Dept: LAB | Facility: HOSPITAL | Age: 22
End: 2017-03-21

## 2017-03-21 DIAGNOSIS — O20.0 THREATENED ABORTION: ICD-10-CM

## 2017-03-21 LAB — HCG INTACT+B SERPL-ACNC: <5 MIU/ML

## 2017-03-21 PROCEDURE — 84702 CHORIONIC GONADOTROPIN TEST: CPT | Performed by: OBSTETRICS & GYNECOLOGY

## 2017-03-21 PROCEDURE — 36415 COLL VENOUS BLD VENIPUNCTURE: CPT

## 2017-06-01 ENCOUNTER — TELEPHONE (OUTPATIENT)
Dept: OBSTETRICS AND GYNECOLOGY | Facility: CLINIC | Age: 22
End: 2017-06-01

## 2017-06-01 NOTE — TELEPHONE ENCOUNTER
----- Message from Angelita Crowell sent at 6/1/2017  1:50 PM EDT -----  Pt says her  had a test done here and she is checking for the results. It would have come from Dr Khan

## 2017-06-02 ENCOUNTER — TELEPHONE (OUTPATIENT)
Dept: OBSTETRICS AND GYNECOLOGY | Facility: CLINIC | Age: 22
End: 2017-06-02

## 2017-06-02 NOTE — TELEPHONE ENCOUNTER
----- Message from Angelita Crowell sent at 6/2/2017 12:15 PM EDT -----  Wants to know what the results on her husbands test are. Call on new # [phone]

## 2017-06-23 ENCOUNTER — TELEPHONE (OUTPATIENT)
Dept: OBSTETRICS AND GYNECOLOGY | Facility: CLINIC | Age: 22
End: 2017-06-23

## 2017-06-23 NOTE — TELEPHONE ENCOUNTER
Pt Notified. States she is wanting to know what the next step is from here? She doesn't know if she needs to come in for a visit to talk about options or if this would be something you could call her about?

## 2017-06-23 NOTE — TELEPHONE ENCOUNTER
Please let her know I had forgotten about her.  I'll try to get her back at the end of the day.  Please send a message back to me after you have spoken to her.

## 2017-06-23 NOTE — TELEPHONE ENCOUNTER
As relates to infertility testing the only thing that has been done to date is her 's sperm count.  It was normal.  Given the findings of laparoscopy consistent with chronic PID, the most important first tests will be a hysterosalpingogram to evaluate for tubal patency.  I've asked her to call next week to get this set up and she is just finishing her cycle.  If he can't get done early in the week next week, it may have to wait until the following month.

## 2017-06-26 DIAGNOSIS — N97.9 INFERTILITY, FEMALE: Primary | ICD-10-CM

## 2017-06-27 ENCOUNTER — TELEPHONE (OUTPATIENT)
Dept: OBSTETRICS AND GYNECOLOGY | Facility: CLINIC | Age: 22
End: 2017-06-27

## 2017-06-27 ENCOUNTER — HOSPITAL ENCOUNTER (OUTPATIENT)
Dept: GENERAL RADIOLOGY | Facility: HOSPITAL | Age: 22
Discharge: HOME | End: 2017-06-27
Attending: OBSTETRICS & GYNECOLOGY | Admitting: OBSTETRICS & GYNECOLOGY

## 2017-06-27 ENCOUNTER — OUTSIDE FACILITY SERVICE (OUTPATIENT)
Dept: OBSTETRICS AND GYNECOLOGY | Facility: CLINIC | Age: 22
End: 2017-06-27

## 2017-06-27 ENCOUNTER — LAB (OUTPATIENT)
Dept: LAB | Facility: HOSPITAL | Age: 22
End: 2017-06-27

## 2017-06-27 DIAGNOSIS — N97.9 INFERTILITY, FEMALE: Primary | ICD-10-CM

## 2017-06-27 DIAGNOSIS — N97.9 INFERTILITY, FEMALE: ICD-10-CM

## 2017-06-27 LAB — PROLACTIN SERPL-MCNC: 10 NG/ML

## 2017-06-27 PROCEDURE — 0 IOPAMIDOL 61 % SOLUTION: Performed by: OBSTETRICS & GYNECOLOGY

## 2017-06-27 PROCEDURE — 74740 X-RAY FEMALE GENITAL TRACT: CPT

## 2017-06-27 PROCEDURE — 84146 ASSAY OF PROLACTIN: CPT

## 2017-06-27 PROCEDURE — 36415 COLL VENOUS BLD VENIPUNCTURE: CPT

## 2017-06-27 PROCEDURE — 58340 CATHETER FOR HYSTEROGRAPHY: CPT | Performed by: OBSTETRICS & GYNECOLOGY

## 2017-06-27 RX ADMIN — IOPAMIDOL 10 ML: 612 INJECTION, SOLUTION INTRAVENOUS at 08:42

## 2017-06-28 NOTE — PROCEDURES
The cervix was visualized through a side opening bivalved speculum.  The cervix was bathed in Betadine and then grasped at the 12 o'clock position.  A balloon tipped catheter was placed through the cervix and the balloon inflated.  Contrast medium was then injected while images were being obtained.    The cavity had a normal appearance.  The right tube had normal fill and spill.  There is no evidence of contrast entering the left fallopian tube.  We did try to logroll her left side down to aid in dye passage.  It was still not visualized.    Multiple images were obtained for documentation.    Instrumentation was removed.  There were no complications.  She tolerated the procedure well.      Chacorta Romero M.D.  June 27, 2017

## 2017-07-13 ENCOUNTER — OFFICE VISIT (OUTPATIENT)
Dept: OBSTETRICS AND GYNECOLOGY | Facility: CLINIC | Age: 22
End: 2017-07-13

## 2017-07-13 VITALS — BODY MASS INDEX: 28.53 KG/M2 | WEIGHT: 156 LBS | RESPIRATION RATE: 14 BRPM

## 2017-07-13 DIAGNOSIS — N97.9 INFERTILITY, FEMALE: Primary | ICD-10-CM

## 2017-07-13 PROCEDURE — 99213 OFFICE O/P EST LOW 20 MIN: CPT | Performed by: OBSTETRICS & GYNECOLOGY

## 2017-07-13 NOTE — PROGRESS NOTES
Subjective   Chief Complaint   Patient presents with   • Fertility Preservation     start clomid     Tatianna Dillon is a 21 y.o. year old .  Patient's last menstrual period was 2017 (exact date).  She presents to be seen because of discussion of fertility options.  She's been pregnant 1 time.  It was a pregnancy of unknown location.  Ultimately her hCGs did decline to 0 with time.  She has sexually had an HSG done the HSG demonstrated fill and spill from one tube.  The other tube did not demonstrate any patency.  We could not exclude tubal spasm.    She has had lab work done including thyroid and prolactin which were normal.  Additionally she had a luteal phase progesterone which was normal.  He has had sperm count which was normal.    The following portions of the patient's history were reviewed and updated as appropriate:current medications and allergies    Smoking status: Former Smoker                                                              Packs/day: 0.25      Years: 0.00         Types: Cigarettes     Start date:      Quit date: 2017     Smokeless status: Not on file                            Objective   Resp 14  Wt 156 lb (70.8 kg)  LMP 2017 (Exact Date)  Breastfeeding? No  BMI 28.53 kg/m2    Lab Review   PRL, TSH and SA and progesterone    Imaging   HSG        Assessment   1. Preconceptional-currently not on folic acid  2. 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 Folic acid for the prevention of neural tube defects was discussed.  At least 0.4 mg should be taken preconceptionally to reduce the risk.  It was explained that this may reduce the baseline incidence of neural tube defect by as much as 65%.  Folic acid can be found in OTC multivitamins, OTC prenatal vitamins or breakfast cereal that that contains 100% of the RDA of folate.  2. Clomid 50 mg day 3 through 7 monthly  3. Continue ovulation predictor kit testing and consider  progesterone around day 21  4. Call if spontaneous menses prior today 35 or no bleeding if after 35 days  5. Should she conceive, she is aware she is at increased risk for ectopic pregnancies.    New Medications Ordered This Visit   Medications   • clomiPHENE (CLOMID) 50 MG tablet     Si tablet daily day 3 through 7     Dispense:  5 tablet     Refill:  4          This note was electronically signed.    Chacorta Romero M.D.  2017    Total time spent today with Tatianna  was 20 minutes (level 3).  Off this time, 100% was spent face-to-face time coordinating care, answering her questions and counseling regarding pre-conception planning.

## 2017-08-15 ENCOUNTER — TELEPHONE (OUTPATIENT)
Dept: OBSTETRICS AND GYNECOLOGY | Facility: CLINIC | Age: 22
End: 2017-08-15

## 2017-09-13 ENCOUNTER — TELEPHONE (OUTPATIENT)
Dept: OBSTETRICS AND GYNECOLOGY | Facility: CLINIC | Age: 22
End: 2017-09-13

## 2017-09-13 NOTE — TELEPHONE ENCOUNTER
----- Message from Marlen Medel sent at 9/13/2017  3:35 PM EDT -----  Pt called and asked us to let you know that she is on Day 3 of her cycle and has refilled her Clomid.  She will start taking it today for five days.

## 2017-10-10 ENCOUNTER — TELEPHONE (OUTPATIENT)
Dept: OBSTETRICS AND GYNECOLOGY | Facility: CLINIC | Age: 22
End: 2017-10-10

## 2017-10-10 ENCOUNTER — LAB (OUTPATIENT)
Dept: LAB | Facility: HOSPITAL | Age: 22
End: 2017-10-10

## 2017-10-10 DIAGNOSIS — N92.6 IRREGULAR MENSES: Primary | ICD-10-CM

## 2017-10-10 DIAGNOSIS — N92.6 IRREGULAR MENSES: ICD-10-CM

## 2017-10-10 LAB — HCG INTACT+B SERPL-ACNC: 53 MIU/ML

## 2017-10-10 PROCEDURE — 84702 CHORIONIC GONADOTROPIN TEST: CPT | Performed by: OBSTETRICS & GYNECOLOGY

## 2017-10-10 PROCEDURE — 36415 COLL VENOUS BLD VENIPUNCTURE: CPT

## 2017-10-10 NOTE — TELEPHONE ENCOUNTER
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 3 days late,  Was told to notify if this happened.

## 2017-10-17 ENCOUNTER — TELEPHONE (OUTPATIENT)
Dept: OBSTETRICS AND GYNECOLOGY | Facility: CLINIC | Age: 22
End: 2017-10-17

## 2017-10-17 ENCOUNTER — LAB (OUTPATIENT)
Dept: LAB | Facility: HOSPITAL | Age: 22
End: 2017-10-17

## 2017-10-17 DIAGNOSIS — N92.6 IRREGULAR MENSES: ICD-10-CM

## 2017-10-17 LAB — HCG INTACT+B SERPL-ACNC: 3410 MIU/ML

## 2017-10-17 PROCEDURE — 36415 COLL VENOUS BLD VENIPUNCTURE: CPT

## 2017-10-17 PROCEDURE — 84702 CHORIONIC GONADOTROPIN TEST: CPT | Performed by: OBSTETRICS & GYNECOLOGY

## 2017-10-17 NOTE — TELEPHONE ENCOUNTER
----- Message from Jennifer Beverly sent at 8/15/2017 11:57 AM EDT -----  PATIENT WANTED YOU TO KNOW SHE IS CONTINUING HER REFILLS --SHE BÁRBARA YOU WANTED HER TO CALL AND FOR US TO LEAVE YOU A MESSAGE.    no

## 2017-10-19 ENCOUNTER — TELEPHONE (OUTPATIENT)
Dept: OBSTETRICS AND GYNECOLOGY | Facility: CLINIC | Age: 22
End: 2017-10-19

## 2017-10-19 DIAGNOSIS — O20.0 THREATENED ABORTION: Primary | ICD-10-CM

## 2017-10-26 ENCOUNTER — OFFICE VISIT (OUTPATIENT)
Dept: OBSTETRICS AND GYNECOLOGY | Facility: CLINIC | Age: 22
End: 2017-10-26

## 2017-10-26 VITALS — BODY MASS INDEX: 30.54 KG/M2 | WEIGHT: 167 LBS | RESPIRATION RATE: 14 BRPM

## 2017-10-26 DIAGNOSIS — Z32.01 PREGNANCY, CONFIRMED, NOT FIRST: Primary | ICD-10-CM

## 2017-10-26 PROBLEM — N97.9 INFERTILITY, FEMALE: Status: RESOLVED | Noted: 2017-06-27 | Resolved: 2017-10-26

## 2017-10-26 PROCEDURE — 99212 OFFICE O/P EST SF 10 MIN: CPT | Performed by: OBSTETRICS & GYNECOLOGY

## 2017-10-26 NOTE — PROGRESS NOTES
Subjective   Chief Complaint   Patient presents with   • Imaging Only     Tatianna Dillon is a 21 y.o. year old  who comes to review her recent testing and discuss next steps.    OTHER THINGS SHE WANTS TO DISCUSS TODAY:  Nothing else       Objective   Resp 14  Wt 167 lb (75.8 kg)  LMP 2017  Breastfeeding? No  BMI 30.54 kg/m2    Lab Review   No data reviewed    Imaging   Pelvic ultrasound report  Pelvic ultrasound images independantly reviewed - SIUP with (+) FHT and S=D       Assessment   1. Early SIUP     Plan   1. The importance of keeping all planned follow-up and taking all medications as prescribed was emphasized.  2. Follow up for new OB visit 2-3 weeks    No orders of the defined types were placed in this encounter.         Total time spent today with Tatianna  was 20 minutes (level 3).  Greater than 50% of the time was spent coordinating care, answering her questions and counseling regarding pathophysiology of her presenting problem along with plans for any diagnositc work-up and treatment.    This note was electronically signed.    Chacorta Romero M.D.  2017    Note: Speech recognition transcription software may have been used to create portions of this document.  An attempt at proofreading has been made but errors in transcription could still be present.

## 2017-11-09 ENCOUNTER — APPOINTMENT (OUTPATIENT)
Dept: ULTRASOUND IMAGING | Facility: HOSPITAL | Age: 22
End: 2017-11-09

## 2017-11-09 ENCOUNTER — HOSPITAL ENCOUNTER (EMERGENCY)
Facility: HOSPITAL | Age: 22
Discharge: HOME | End: 2017-11-09
Attending: EMERGENCY MEDICINE | Admitting: EMERGENCY MEDICINE

## 2017-11-09 VITALS
DIASTOLIC BLOOD PRESSURE: 88 MMHG | WEIGHT: 160 LBS | OXYGEN SATURATION: 99 % | RESPIRATION RATE: 18 BRPM | HEART RATE: 80 BPM | HEIGHT: 62 IN | TEMPERATURE: 98.3 F | SYSTOLIC BLOOD PRESSURE: 120 MMHG | BODY MASS INDEX: 29.44 KG/M2

## 2017-11-09 DIAGNOSIS — R10.2 ADNEXAL PAIN: Primary | ICD-10-CM

## 2017-11-09 DIAGNOSIS — Z3A.08 8 WEEKS GESTATION OF PREGNANCY: ICD-10-CM

## 2017-11-09 LAB
ALBUMIN SERPL-MCNC: 4.2 G/DL (ref 3.2–4.8)
ALBUMIN/GLOB SERPL: 1.8 G/DL (ref 1.5–2.5)
ALP SERPL-CCNC: 71 U/L (ref 25–100)
ALT SERPL W P-5'-P-CCNC: 27 U/L (ref 7–40)
ANION GAP SERPL CALCULATED.3IONS-SCNC: 3 MMOL/L (ref 3–11)
AST SERPL-CCNC: 23 U/L (ref 0–33)
B-HCG UR QL: POSITIVE
BACTERIA UR QL AUTO: ABNORMAL /HPF
BASOPHILS # BLD AUTO: 0.03 10*3/MM3 (ref 0–0.2)
BASOPHILS NFR BLD AUTO: 0.4 % (ref 0–1)
BILIRUB SERPL-MCNC: 0.4 MG/DL (ref 0.3–1.2)
BILIRUB UR QL STRIP: NEGATIVE
BUN BLD-MCNC: 12 MG/DL (ref 9–23)
BUN/CREAT SERPL: 12 (ref 7–25)
CALCIUM SPEC-SCNC: 9.2 MG/DL (ref 8.7–10.4)
CHLORIDE SERPL-SCNC: 106 MMOL/L (ref 99–109)
CLARITY UR: ABNORMAL
CO2 SERPL-SCNC: 27 MMOL/L (ref 20–31)
COLOR UR: YELLOW
CREAT BLD-MCNC: 1 MG/DL (ref 0.6–1.3)
DEPRECATED RDW RBC AUTO: 40.2 FL (ref 37–54)
EOSINOPHIL # BLD AUTO: 0.14 10*3/MM3 (ref 0–0.3)
EOSINOPHIL NFR BLD AUTO: 1.7 % (ref 0–3)
ERYTHROCYTE [DISTWIDTH] IN BLOOD BY AUTOMATED COUNT: 11.8 % (ref 11.3–14.5)
GFR SERPL CREATININE-BSD FRML MDRD: 69 ML/MIN/1.73
GLOBULIN UR ELPH-MCNC: 2.4 GM/DL
GLUCOSE BLD-MCNC: 83 MG/DL (ref 70–100)
GLUCOSE UR STRIP-MCNC: NEGATIVE MG/DL
HCG INTACT+B SERPL-ACNC: NORMAL MIU/ML
HCT VFR BLD AUTO: 37.2 % (ref 34.5–44)
HGB BLD-MCNC: 12.6 G/DL (ref 11.5–15.5)
HGB UR QL STRIP.AUTO: NEGATIVE
HOLD SPECIMEN: NORMAL
HOLD SPECIMEN: NORMAL
HYALINE CASTS UR QL AUTO: ABNORMAL /LPF
IMM GRANULOCYTES # BLD: 0.02 10*3/MM3 (ref 0–0.03)
IMM GRANULOCYTES NFR BLD: 0.2 % (ref 0–0.6)
INTERNAL NEGATIVE CONTROL: NORMAL
INTERNAL POSITIVE CONTROL: POSITIVE
KETONES UR QL STRIP: NEGATIVE
LEUKOCYTE ESTERASE UR QL STRIP.AUTO: ABNORMAL
LIPASE SERPL-CCNC: 45 U/L (ref 6–51)
LYMPHOCYTES # BLD AUTO: 2.16 10*3/MM3 (ref 0.6–4.8)
LYMPHOCYTES NFR BLD AUTO: 25.6 % (ref 24–44)
Lab: NORMAL
MCH RBC QN AUTO: 31.2 PG (ref 27–31)
MCHC RBC AUTO-ENTMCNC: 33.9 G/DL (ref 32–36)
MCV RBC AUTO: 92.1 FL (ref 80–99)
MONOCYTES # BLD AUTO: 0.86 10*3/MM3 (ref 0–1)
MONOCYTES NFR BLD AUTO: 10.2 % (ref 0–12)
NEUTROPHILS # BLD AUTO: 5.22 10*3/MM3 (ref 1.5–8.3)
NEUTROPHILS NFR BLD AUTO: 61.9 % (ref 41–71)
NITRITE UR QL STRIP: NEGATIVE
PH UR STRIP.AUTO: 7 [PH] (ref 5–8)
PLATELET # BLD AUTO: 231 10*3/MM3 (ref 150–450)
PMV BLD AUTO: 10.6 FL (ref 6–12)
POTASSIUM BLD-SCNC: 3.6 MMOL/L (ref 3.5–5.5)
PROT SERPL-MCNC: 6.6 G/DL (ref 5.7–8.2)
PROT UR QL STRIP: NEGATIVE
RBC # BLD AUTO: 4.04 10*6/MM3 (ref 3.89–5.14)
RBC # UR: ABNORMAL /HPF
REF LAB TEST METHOD: ABNORMAL
SODIUM BLD-SCNC: 136 MMOL/L (ref 132–146)
SP GR UR STRIP: 1.02 (ref 1–1.03)
SQUAMOUS #/AREA URNS HPF: ABNORMAL /HPF
UROBILINOGEN UR QL STRIP: ABNORMAL
WBC NRBC COR # BLD: 8.43 10*3/MM3 (ref 3.5–10.8)
WBC UR QL AUTO: ABNORMAL /HPF
WHOLE BLOOD HOLD SPECIMEN: NORMAL
WHOLE BLOOD HOLD SPECIMEN: NORMAL

## 2017-11-09 PROCEDURE — 80053 COMPREHEN METABOLIC PANEL: CPT | Performed by: EMERGENCY MEDICINE

## 2017-11-09 PROCEDURE — 84702 CHORIONIC GONADOTROPIN TEST: CPT | Performed by: EMERGENCY MEDICINE

## 2017-11-09 PROCEDURE — 83690 ASSAY OF LIPASE: CPT | Performed by: EMERGENCY MEDICINE

## 2017-11-09 PROCEDURE — 85025 COMPLETE CBC W/AUTO DIFF WBC: CPT | Performed by: EMERGENCY MEDICINE

## 2017-11-09 PROCEDURE — 81001 URINALYSIS AUTO W/SCOPE: CPT | Performed by: EMERGENCY MEDICINE

## 2017-11-09 PROCEDURE — 76817 TRANSVAGINAL US OBSTETRIC: CPT

## 2017-11-09 PROCEDURE — 93976 VASCULAR STUDY: CPT

## 2017-11-09 PROCEDURE — 99283 EMERGENCY DEPT VISIT LOW MDM: CPT

## 2017-11-09 RX ORDER — SODIUM CHLORIDE 0.9 % (FLUSH) 0.9 %
10 SYRINGE (ML) INJECTION AS NEEDED
Status: DISCONTINUED | OUTPATIENT
Start: 2017-11-09 | End: 2017-11-09 | Stop reason: HOSPADM

## 2017-11-10 NOTE — ED PROVIDER NOTES
Subjective   Patient is a 21 y.o. female presenting with pregnancy problem.   History provided by:  Patient   used: No    Pregnancy Problem   The current episode started today. The problem has been gradually improving. Episode is moderate. Gestational age is 8 weeks.   Primary symptoms include abdominal pain. Primary symptoms: Patient states she sneezed had abrupt onset of right lower her adnexal pain.. There has been no prenatal care.   Associated symptoms include no dysuria, no fever, no headaches, no malaise, no nausea, no shortness of breath, no vomiting and no weakness. Prior pregnancy complications do not include history of gestational diabetes, history of pre-eclampsia, prior premature delivery, history of  labor or prior stillbirth.   Risk factors do not include advanced maternal age, placenta previa, pre-eclampsia or pre-eclampsia in family.       Review of Systems   Constitutional: Negative for chills and fever.   Respiratory: Negative for chest tightness, shortness of breath and wheezing.    Cardiovascular: Negative for chest pain and palpitations.   Gastrointestinal: Positive for abdominal pain. Negative for diarrhea, nausea and vomiting.   Genitourinary: Negative for dysuria, frequency, hematuria and urgency.   Musculoskeletal: Negative for back pain and neck pain.   Neurological: Negative for dizziness, weakness and headaches.   Psychiatric/Behavioral: Negative.    All other systems reviewed and are negative.      Past Medical History:   Diagnosis Date   • Anemia    • Attention deficit hyperactivity disorder (ADHD), combined type 11/10/2016   • Hypothyroid 2006    Resolved after ~ 3 years of Rxs   • Menorrhagia with regular cycle 11/10/2016   • Pelvic adhesive disease 2016       No Known Allergies    Past Surgical History:   Procedure Laterality Date   • LAPAROSCOPIC APPENDECTOMY     • LAPAROSCOPIC LYSIS OF ADHESIONS  2016    Findings consistent with chronic PID    • NASAL SEPTUM SURGERY  2008   • MI CATH/INJECT HYSTEROSALPINGOGRAM  06/28/2017     Uterus normal; right adnexa normal; left tube did not demonstrate fill or spill   • TONSILLECTOMY AND ADENOIDECTOMY  2003       Family History   Problem Relation Age of Onset   • Family history unknown: Yes       Social History     Social History   • Marital status:      Spouse name: N/A   • Number of children: N/A   • Years of education: N/A     Social History Main Topics   • Smoking status: Former Smoker     Packs/day: 0.25     Types: Cigarettes     Start date: 2012     Quit date: 05/2017   • Smokeless tobacco: Never Used   • Alcohol use No   • Drug use: No   • Sexual activity: Yes     Other Topics Concern   • None     Social History Narrative    ** Merged History Encounter **                Objective   Physical Exam   Constitutional: She is oriented to person, place, and time. She appears well-developed and well-nourished.   HENT:   Head: Normocephalic and atraumatic.   Right Ear: External ear normal.   Left Ear: External ear normal.   Nose: Nose normal.   Mouth/Throat: Oropharynx is clear and moist.   Eyes: Conjunctivae and EOM are normal. Pupils are equal, round, and reactive to light. No scleral icterus.   Neck: Normal range of motion. No thyromegaly present.   Cardiovascular: Normal rate, regular rhythm and normal heart sounds.    Pulmonary/Chest: Effort normal and breath sounds normal. No respiratory distress. She has no wheezes. She has no rales. She exhibits no tenderness.   Abdominal: Soft. Bowel sounds are normal. She exhibits no distension and no mass. There is tenderness (mild tenderness right adnexa). There is no rebound and no guarding. No hernia.   Musculoskeletal: Normal range of motion.   Lymphadenopathy:     She has no cervical adenopathy.   Neurological: She is alert and oriented to person, place, and time. She has normal reflexes. She displays normal reflexes. No cranial nerve deficit. Coordination  "normal.   Skin: Skin is warm and dry.   Psychiatric: She has a normal mood and affect. Her behavior is normal. Judgment and thought content normal.   Nursing note and vitals reviewed.      Procedures         ED Course  ED Course        No results found for this or any previous visit (from the past 24 hour(s)).  Note: In addition to lab results from this visit, the labs listed above may include labs taken at another facility or during a different encounter within the last 24 hours. Please correlate lab times with ED admission and discharge times for further clarification of the services performed during this visit.    US Testicular or Ovarian Vascular Limited   Final Result      1.  Single, living intrauterine gestation, with estimated gestational age of    approximately 8 weeks, one day. Based on today's sonographic measurements,    estimated date of delivery is 06/20/2018.      2.  Right ovarian corpus luteum.      3.  No evidence of ovarian torsion.            THIS DOCUMENT HAS BEEN ELECTRONICALLY SIGNED BY NESSA GROVER MD      US Ob Transvaginal   Final Result      1.  Single, living intrauterine gestation, with estimated gestational age of    approximately 8 weeks, one day. Based on today's sonographic measurements,    estimated date of delivery is 06/20/2018.      2.  Right ovarian corpus luteum.      3.  No evidence of ovarian torsion.            THIS DOCUMENT HAS BEEN ELECTRONICALLY SIGNED BY NESSA GROVER MD        Vitals:    11/09/17 1737 11/09/17 2155   BP: 134/75 120/88   BP Location: Left arm    Patient Position: Sitting    Pulse: 85 80   Resp: 16 18   Temp: 98.3 °F (36.8 °C)    TempSrc: Oral    SpO2: 98% 99%   Weight: 160 lb (72.6 kg)    Height: 62\" (157.5 cm)      Medications - No data to display  ECG/EMG Results (last 24 hours)     ** No results found for the last 24 hours. **                  MDM  Number of Diagnoses or Management Options  new and requires workup  new and requires workup     Amount " and/or Complexity of Data Reviewed  Clinical lab tests: reviewed and ordered  Tests in the radiology section of CPT®: reviewed and ordered  Tests in the medicine section of CPT®: ordered and reviewed  Discuss the patient with other providers: yes    Patient Progress  Patient progress: stable      Final diagnoses:   Adnexal pain   8 weeks gestation of pregnancy            ABDON Candelaria  11/10/17 9890

## 2017-11-12 PROBLEM — Z34.80 PRENATAL CARE, SUBSEQUENT PREGNANCY: Status: ACTIVE | Noted: 2017-11-12

## 2017-11-16 ENCOUNTER — LAB (OUTPATIENT)
Dept: LAB | Facility: HOSPITAL | Age: 22
End: 2017-11-16

## 2017-11-16 ENCOUNTER — INITIAL PRENATAL (OUTPATIENT)
Dept: OBSTETRICS AND GYNECOLOGY | Facility: CLINIC | Age: 22
End: 2017-11-16

## 2017-11-16 VITALS — BODY MASS INDEX: 31.46 KG/M2 | DIASTOLIC BLOOD PRESSURE: 68 MMHG | SYSTOLIC BLOOD PRESSURE: 106 MMHG | WEIGHT: 172 LBS

## 2017-11-16 DIAGNOSIS — O20.0 THREATENED ABORTION: ICD-10-CM

## 2017-11-16 DIAGNOSIS — Z34.81 PRENATAL CARE, SUBSEQUENT PREGNANCY IN FIRST TRIMESTER: Primary | ICD-10-CM

## 2017-11-16 LAB
ABO GROUP BLD: NORMAL
AMPHET+METHAMPHET UR QL: NEGATIVE
AMPHETAMINES UR QL: NEGATIVE
BARBITURATES UR QL SCN: NEGATIVE
BASOPHILS # BLD AUTO: 0.03 10*3/MM3 (ref 0–0.2)
BASOPHILS NFR BLD AUTO: 0.3 % (ref 0–1)
BENZODIAZ UR QL SCN: NEGATIVE
BLD GP AB SCN SERPL QL: NEGATIVE
BUPRENORPHINE SERPL-MCNC: NEGATIVE NG/ML
CANNABINOIDS SERPL QL: NEGATIVE
COCAINE UR QL: NEGATIVE
DEPRECATED RDW RBC AUTO: 41 FL (ref 37–54)
EOSINOPHIL # BLD AUTO: 0.1 10*3/MM3 (ref 0–0.3)
EOSINOPHIL NFR BLD AUTO: 0.9 % (ref 0–3)
ERYTHROCYTE [DISTWIDTH] IN BLOOD BY AUTOMATED COUNT: 12.2 % (ref 11.3–14.5)
HBV SURFACE AG SERPL QL IA: NORMAL
HCG INTACT+B SERPL-ACNC: NORMAL MIU/ML
HCT VFR BLD AUTO: 38.7 % (ref 34.5–44)
HGB BLD-MCNC: 13.4 G/DL (ref 11.5–15.5)
HIV1+2 AB SER QL: NORMAL
IMM GRANULOCYTES # BLD: 0.01 10*3/MM3 (ref 0–0.03)
IMM GRANULOCYTES NFR BLD: 0.1 % (ref 0–0.6)
LYMPHOCYTES # BLD AUTO: 2.42 10*3/MM3 (ref 0.6–4.8)
LYMPHOCYTES NFR BLD AUTO: 22.8 % (ref 24–44)
MCH RBC QN AUTO: 31.9 PG (ref 27–31)
MCHC RBC AUTO-ENTMCNC: 34.6 G/DL (ref 32–36)
MCV RBC AUTO: 92.1 FL (ref 80–99)
METHADONE UR QL SCN: NEGATIVE
MONOCYTES # BLD AUTO: 0.84 10*3/MM3 (ref 0–1)
MONOCYTES NFR BLD AUTO: 7.9 % (ref 0–12)
NEUTROPHILS # BLD AUTO: 7.2 10*3/MM3 (ref 1.5–8.3)
NEUTROPHILS NFR BLD AUTO: 68 % (ref 41–71)
OPIATES UR QL: NEGATIVE
OXYCODONE UR QL SCN: NEGATIVE
PCP UR QL SCN: NEGATIVE
PLATELET # BLD AUTO: 241 10*3/MM3 (ref 150–450)
PMV BLD AUTO: 11.3 FL (ref 6–12)
PROPOXYPH UR QL: NEGATIVE
RBC # BLD AUTO: 4.2 10*6/MM3 (ref 3.89–5.14)
RH BLD: POSITIVE
RUBV IGG SER QL: NORMAL
RUBV IGG SER-ACNC: 71.6 IU/ML
TRICYCLICS UR QL SCN: NEGATIVE
WBC NRBC COR # BLD: 10.6 10*3/MM3 (ref 3.5–10.8)

## 2017-11-16 PROCEDURE — 84702 CHORIONIC GONADOTROPIN TEST: CPT | Performed by: OBSTETRICS & GYNECOLOGY

## 2017-11-16 PROCEDURE — 36415 COLL VENOUS BLD VENIPUNCTURE: CPT | Performed by: OBSTETRICS & GYNECOLOGY

## 2017-11-16 PROCEDURE — 99214 OFFICE O/P EST MOD 30 MIN: CPT | Performed by: OBSTETRICS & GYNECOLOGY

## 2017-11-16 PROCEDURE — 80306 DRUG TEST PRSMV INSTRMNT: CPT | Performed by: OBSTETRICS & GYNECOLOGY

## 2017-11-16 PROCEDURE — 87086 URINE CULTURE/COLONY COUNT: CPT | Performed by: OBSTETRICS & GYNECOLOGY

## 2017-11-16 PROCEDURE — 80081 OBSTETRIC PANEL INC HIV TSTG: CPT | Performed by: OBSTETRICS & GYNECOLOGY

## 2017-11-16 RX ORDER — PRENATAL VIT/IRON FUM/FOLIC AC 27MG-0.8MG
TABLET ORAL DAILY
COMMUNITY
End: 2019-02-28

## 2017-11-16 RX ORDER — FOLIC ACID 1 MG/1
1 TABLET ORAL DAILY
COMMUNITY
End: 2018-01-12

## 2017-11-16 NOTE — PROGRESS NOTES
Subjective   Chief Complaint   Patient presents with   • Initial Prenatal Visit       Tatianna Dillon is a 21 y.o. year old .  Patient's last menstrual period was 2017.  She presents to be seen to initiate prenatal care.     Smoking status: Former Smoker                                                              Packs/day: 0.25      Years: 0.00         Types: Cigarettes     Start date:      Quit date: 2017  Smokeless status: Never Used                          The following portions of the patient's history were reviewed and updated as appropriate:vital signs, allergies, current medications, past medical history, past social history, past surgical history and problem list.    Objective   Lab Review   No data reviewed    Imaging   Pelvic ultrasound report    Assessment/Plan   ASSESSMENT  1. 21 y.o. year old  at 9w3d  2. Supervision of low risk pregnancy    PLAN  1. The problem list for pregnancy was initiated today  2. Tests ordered today:  Orders Placed This Encounter   Procedures   • Urine Culture - Urine, Urine, Clean Catch   • HIV-1 / O / 2 Ag / Antibody 4th Generation   • Urine Drug Screen - Urine, Clean Catch     Please confirm all positive UDS   • Obstetric Panel     3. Testing for GC / Chlamydia / trichomonas will need to be done at her next prenatal visit  4. Genetic testing reviewed: MSAFP-4 and she will consider the information and make a decision at a later date.  5. Information reviewed: exercise in pregnancy, nutrition in pregnancy, weight gain in pregnancy, work and travel restrictions during pregnancy, list of OTC medications acceptable in pregnancy and call coverage groups    Total time spent today with Tatianna  was 30 minutes (level 4).  Off this time, 100% was spent face-to-face time coordinating care, answering her questions and counseling regarding pathophysiology of her presenting problem along with plans for any diagnositc work-up and treatment.    Follow up: 3  week(s)       This note was electronically signed.    Chacorta Romero MD  November 16, 2017

## 2017-11-17 LAB — RPR SER QL: NORMAL

## 2017-11-18 LAB
BACTERIA SPEC AEROBE CULT: NORMAL
BACTERIA SPEC AEROBE CULT: NORMAL

## 2017-12-01 ENCOUNTER — TELEPHONE (OUTPATIENT)
Dept: OBSTETRICS AND GYNECOLOGY | Facility: CLINIC | Age: 22
End: 2017-12-01

## 2017-12-01 RX ORDER — PROMETHAZINE HYDROCHLORIDE 25 MG/1
25 SUPPOSITORY RECTAL EVERY 6 HOURS PRN
Qty: 30 SUPPOSITORY | Refills: 1 | Status: SHIPPED | OUTPATIENT
Start: 2017-12-01 | End: 2018-03-01

## 2017-12-01 RX ORDER — ONDANSETRON 4 MG/1
4 TABLET, FILM COATED ORAL DAILY PRN
Qty: 30 TABLET | Refills: 1 | Status: SHIPPED | OUTPATIENT
Start: 2017-12-01 | End: 2018-01-12

## 2017-12-01 NOTE — TELEPHONE ENCOUNTER
"Patient states that she is 11/1/2 weeks pregnant and has had nausea/vomiting.  She states that the last 2 1/2 days that she has been unable to keep anything down, not even water.  Having dizziness and decreased urinary output (\"has only peed once today\").  States that there is a viral illness going around her workplace and she is unsure whether or not she has that illness or if the nausea and vomiting is related to her pregnancy.  She had some phenergan at home and took some last night, but was unable to keep it down.  Dizzy.   Had chills last night (? Fever, did not take temperature), at work today and doesn't think she has a fever.  Please advise.  "

## 2017-12-01 NOTE — TELEPHONE ENCOUNTER
I called Tatianna back to let her know that Dr. Romero had sent in 2 prescriptions for nausea/vomiting.  Advised her to pick these up ASAP.  She was advised to call back if she is still unable to keep fluids down, and was told that she may require IV fluids.  She knows to call the office number after hours and she will be put in touch with the exchange.

## 2017-12-01 NOTE — TELEPHONE ENCOUNTER
I am going to send a prescription for both Zofran and Phenergan rectal suppository to her pharmacy.  Would make sure she knows that the Zofran if taken a lot can increase constipation.  If this does not help she's can need to come into the hospital over the weekend for fluids    New Medications Ordered This Visit   Medications   • promethazine (PHENERGAN) 25 MG suppository     Sig: Insert 1 suppository into the rectum Every 6 (Six) Hours As Needed for Nausea.     Dispense:  30 suppository     Refill:  1   • ondansetron (ZOFRAN) 4 MG tablet     Sig: Take 1 tablet by mouth Daily As Needed for Nausea or Vomiting.     Dispense:  30 tablet     Refill:  1

## 2017-12-01 NOTE — TELEPHONE ENCOUNTER
DR MIJARES PATIENT    11 1/2 WEEKS PREGNANT    VERY DIZZY, VOMITING A LOT MORE    PLEASE CALL HER   [phone]

## 2017-12-11 ENCOUNTER — ROUTINE PRENATAL (OUTPATIENT)
Dept: OBSTETRICS AND GYNECOLOGY | Facility: CLINIC | Age: 22
End: 2017-12-11

## 2017-12-11 VITALS — DIASTOLIC BLOOD PRESSURE: 62 MMHG | BODY MASS INDEX: 31.83 KG/M2 | SYSTOLIC BLOOD PRESSURE: 108 MMHG | WEIGHT: 174 LBS

## 2017-12-11 DIAGNOSIS — Z34.82 PRENATAL CARE, SUBSEQUENT PREGNANCY IN SECOND TRIMESTER: Primary | ICD-10-CM

## 2017-12-11 LAB
2ND TRIMESTER 4 SCREEN SERPL-IMP: NORMAL
C TRACH RRNA SPEC DONR QL NAA+PROBE: NEGATIVE
N GONORRHOEA DNA SPEC QL NAA+PROBE: NEGATIVE

## 2017-12-11 PROCEDURE — 99213 OFFICE O/P EST LOW 20 MIN: CPT | Performed by: OBSTETRICS & GYNECOLOGY

## 2017-12-11 NOTE — PROGRESS NOTES
Chief Complaint   Patient presents with   • Routine Prenatal Visit       HPI: Tatianna is a  currently at 13w0d who today reports the following:  Nausea - No; Vaginal bleeding -  No; Heartburn - No.    ROS:  GI: Constipation - No; Diarrhea - No    Neuro: Headache - No; Visual change - No      EXAM:  Vitals: See prenatal flowsheet   Abdomen: See prenatal flowsheet   Urine glucose/protein: See prenatal flowsheet   Pelvic: See prenatal flowsheet     Prenatal Labs  Lab Results   Component Value Date    HGB 13.4 2017    RUBELLAABIGG 71.6 2017    RUBELLAABIGG Immune 2017    HEPBSAG Non-Reactive 2017    ABSCRN Negative 2017    [id number] Non-Reactive 2017    URINECX >100,000 CFU/mL Normal Urogenital Jannet 2017       MDM:  Impression: 1. Supervision of low risk pregnancy   Tests done today: 1. GC/Chlamydia testing   Topics discussed: 1. Continue with PNV's  2. Prenatal labs reviewed  3. Today we discussed genetic testing.  She is aware that the MSAFP-4 is a screening test.  A screening test is not a diagnostic test.  This means that a negative test does not guarantee an unaffected fetus and a positive test does not mean the fetus has the condition for which the test is being performed.  If the test returns positive, a diagnostic test should be consider to determine if the fetus in fact has the condition.  After considering the options previously presented, she is not interested in having genetic testing performed.   Tests scheduled today for her next visit:   none   Next visit: See prenatal flowsheet

## 2017-12-28 ENCOUNTER — HOSPITAL ENCOUNTER (EMERGENCY)
Facility: HOSPITAL | Age: 22
Discharge: HOME | End: 2017-12-29
Attending: EMERGENCY MEDICINE | Admitting: EMERGENCY MEDICINE

## 2017-12-28 DIAGNOSIS — Z34.90 INTRAUTERINE PREGNANCY: ICD-10-CM

## 2017-12-28 DIAGNOSIS — J11.1 INFLUENZA: Primary | ICD-10-CM

## 2017-12-28 DIAGNOSIS — R11.2 NON-INTRACTABLE VOMITING WITH NAUSEA, UNSPECIFIED VOMITING TYPE: ICD-10-CM

## 2017-12-28 LAB
BACTERIA UR QL AUTO: ABNORMAL /HPF
BILIRUB UR QL STRIP: NEGATIVE
BUN BLDA-MCNC: 8 MG/DL (ref 8–26)
CA-I BLDA-SCNC: 1.22 MMOL/L (ref 1.2–1.32)
CHLORIDE BLDA-SCNC: 102 MMOL/L (ref 98–109)
CLARITY UR: ABNORMAL
CO2 BLDA-SCNC: 25 MMOL/L (ref 24–29)
COLOR UR: YELLOW
CREAT BLDA-MCNC: 0.7 MG/DL (ref 0.6–1.3)
GLUCOSE BLDC GLUCOMTR-MCNC: 80 MG/DL (ref 70–130)
GLUCOSE UR STRIP-MCNC: NEGATIVE MG/DL
HCT VFR BLDA CALC: 36 % (ref 38–51)
HGB BLDA-MCNC: 12.2 G/DL (ref 12–17)
HGB UR QL STRIP.AUTO: NEGATIVE
HYALINE CASTS UR QL AUTO: ABNORMAL /LPF
KETONES UR QL STRIP: NEGATIVE
LEUKOCYTE ESTERASE UR QL STRIP.AUTO: NEGATIVE
NITRITE UR QL STRIP: NEGATIVE
PH UR STRIP.AUTO: 7 [PH] (ref 5–8)
POTASSIUM BLDA-SCNC: 3.5 MMOL/L (ref 3.5–4.9)
PROT UR QL STRIP: NEGATIVE
RBC # UR: ABNORMAL /HPF
REF LAB TEST METHOD: ABNORMAL
SODIUM BLDA-SCNC: 139 MMOL/L (ref 138–146)
SP GR UR STRIP: 1.02 (ref 1–1.03)
SQUAMOUS #/AREA URNS HPF: ABNORMAL /HPF
UROBILINOGEN UR QL STRIP: ABNORMAL
WBC UR QL AUTO: ABNORMAL /HPF

## 2017-12-28 PROCEDURE — 80047 BASIC METABLC PNL IONIZED CA: CPT

## 2017-12-28 PROCEDURE — 81001 URINALYSIS AUTO W/SCOPE: CPT | Performed by: EMERGENCY MEDICINE

## 2017-12-28 PROCEDURE — 85014 HEMATOCRIT: CPT

## 2017-12-28 PROCEDURE — 25010000002 ONDANSETRON PER 1 MG: Performed by: EMERGENCY MEDICINE

## 2017-12-28 PROCEDURE — 96361 HYDRATE IV INFUSION ADD-ON: CPT

## 2017-12-28 PROCEDURE — 99284 EMERGENCY DEPT VISIT MOD MDM: CPT

## 2017-12-28 PROCEDURE — 96374 THER/PROPH/DIAG INJ IV PUSH: CPT

## 2017-12-28 RX ORDER — SODIUM CHLORIDE 0.9 % (FLUSH) 0.9 %
10 SYRINGE (ML) INJECTION AS NEEDED
Status: DISCONTINUED | OUTPATIENT
Start: 2017-12-28 | End: 2017-12-29 | Stop reason: HOSPADM

## 2017-12-28 RX ORDER — ONDANSETRON 8 MG/1
8 TABLET, ORALLY DISINTEGRATING ORAL EVERY 8 HOURS PRN
Qty: 20 TABLET | Refills: 0 | Status: SHIPPED | OUTPATIENT
Start: 2017-12-28 | End: 2018-01-12

## 2017-12-28 RX ORDER — ACETAMINOPHEN 500 MG
1000 TABLET ORAL ONCE
Status: COMPLETED | OUTPATIENT
Start: 2017-12-28 | End: 2017-12-28

## 2017-12-28 RX ORDER — ONDANSETRON 2 MG/ML
4 INJECTION INTRAMUSCULAR; INTRAVENOUS ONCE
Status: COMPLETED | OUTPATIENT
Start: 2017-12-28 | End: 2017-12-28

## 2017-12-28 RX ADMIN — ACETAMINOPHEN 1000 MG: 500 TABLET ORAL at 22:31

## 2017-12-28 RX ADMIN — ONDANSETRON 4 MG: 2 INJECTION INTRAMUSCULAR; INTRAVENOUS at 22:32

## 2017-12-28 RX ADMIN — SODIUM CHLORIDE 2000 ML: 9 INJECTION, SOLUTION INTRAVENOUS at 22:31

## 2017-12-29 VITALS
WEIGHT: 174 LBS | DIASTOLIC BLOOD PRESSURE: 54 MMHG | OXYGEN SATURATION: 94 % | BODY MASS INDEX: 32.02 KG/M2 | HEART RATE: 69 BPM | RESPIRATION RATE: 18 BRPM | TEMPERATURE: 98.8 F | HEIGHT: 62 IN | SYSTOLIC BLOOD PRESSURE: 100 MMHG

## 2018-01-12 ENCOUNTER — ROUTINE PRENATAL (OUTPATIENT)
Dept: OBSTETRICS AND GYNECOLOGY | Facility: CLINIC | Age: 23
End: 2018-01-12

## 2018-01-12 VITALS — BODY MASS INDEX: 32.74 KG/M2 | SYSTOLIC BLOOD PRESSURE: 106 MMHG | WEIGHT: 179 LBS | DIASTOLIC BLOOD PRESSURE: 70 MMHG

## 2018-01-12 DIAGNOSIS — Z34.82 PRENATAL CARE, SUBSEQUENT PREGNANCY IN SECOND TRIMESTER: Primary | ICD-10-CM

## 2018-01-12 PROCEDURE — 99213 OFFICE O/P EST LOW 20 MIN: CPT | Performed by: OBSTETRICS & GYNECOLOGY

## 2018-01-12 NOTE — PROGRESS NOTES
Chief Complaint   Patient presents with   • Routine Prenatal Visit       HPI: Tatianna is a  currently at 17w4d who today reports the following:  Contractions - No; Leaking - No; Vaginal bleeding -  No; Heartburn - No.  Having LBP and GERD    ROS:  GI: Nausea - No ; Constipation - No; Diarrhea - No    Neuro: Headache - No ; Visual change - No      EXAM:  Vitals: See prenatal flowsheet   Abdomen: See prenatal flowsheet   Urine glucose/protein: See prenatal flowsheet   Pelvic: See prenatal flowsheet     Lab Results   Component Value Date    ABO O 2017    RH Positive 2017    ABSCRN Negative 2017       MDM:  Impression: 1. Supervision of low risk pregnancy   Tests done today: 1. none   Topics discussed: 1. Continue with PNV's  2. Prenatal labs reviewed  3. none - she had no major complaints,questions or concerns  4. OTC medical options for her insomnia and GERD   Tests scheduled today for her next visit:   U/S for anatomic screening   Next visit: See prenatal flowsheet

## 2018-02-01 ENCOUNTER — ROUTINE PRENATAL (OUTPATIENT)
Dept: OBSTETRICS AND GYNECOLOGY | Facility: CLINIC | Age: 23
End: 2018-02-01

## 2018-02-01 VITALS — DIASTOLIC BLOOD PRESSURE: 68 MMHG | BODY MASS INDEX: 33.47 KG/M2 | SYSTOLIC BLOOD PRESSURE: 110 MMHG | WEIGHT: 183 LBS

## 2018-02-01 DIAGNOSIS — Z34.82 PRENATAL CARE, SUBSEQUENT PREGNANCY IN SECOND TRIMESTER: Primary | ICD-10-CM

## 2018-02-01 DIAGNOSIS — O09.299 PREVIOUS PREGNANCY WITH CONGENITAL HEART DEFECT, CURRENTLY PREGNANT: ICD-10-CM

## 2018-02-01 PROCEDURE — 99213 OFFICE O/P EST LOW 20 MIN: CPT | Performed by: OBSTETRICS & GYNECOLOGY

## 2018-02-01 NOTE — PROGRESS NOTES
Chief Complaint   Patient presents with   • Routine Prenatal Visit       HPI: Tatianna is a  currently at 20w3d who today reports the following:  Contractions - No; Leaking - No; Vaginal bleeding -  No; Heartburn - No.  Just learned today that FOHUI's first son  with HLHS    ROS:  GI: Nausea - No ; Constipation - No; Diarrhea - No    Neuro: Headache - No ; Visual change - No      EXAM:  Vitals: See prenatal flowsheet   Abdomen: See prenatal flowsheet   Urine glucose/protein: See prenatal flowsheet   Pelvic: See prenatal flowsheet     Lab Results   Component Value Date    ABO O 2017    RH Positive 2017    ABSCRN Negative 2017       MDM:  Impression: 1. Supervision of low risk pregnancy   2. FHx of CHD - HLHS   Tests done today: 1. U/S for anatomic screening - anatomy was not completely seen today   Topics discussed: 1. Continue with PNV's  2. Prenatal labs reviewed  3. none - she had no major complaints,questions or concerns   Tests scheduled today for her next visit:   U/S to complete the anatomic screening at [facility] along with echo   Next visit: See prenatal flowsheet

## 2018-02-17 ENCOUNTER — HOSPITAL ENCOUNTER (OUTPATIENT)
Facility: HOSPITAL | Age: 23
Setting detail: OBSERVATION
Discharge: HOME | End: 2018-02-17
Attending: OBSTETRICS & GYNECOLOGY | Admitting: OBSTETRICS & GYNECOLOGY

## 2018-02-17 VITALS
HEART RATE: 77 BPM | DIASTOLIC BLOOD PRESSURE: 62 MMHG | HEIGHT: 62 IN | WEIGHT: 183 LBS | TEMPERATURE: 98.6 F | BODY MASS INDEX: 33.68 KG/M2 | RESPIRATION RATE: 16 BRPM | SYSTOLIC BLOOD PRESSURE: 115 MMHG

## 2018-02-17 PROBLEM — Z34.90 PREGNANCY: Status: ACTIVE | Noted: 2018-02-17

## 2018-02-17 LAB
BILIRUB UR QL STRIP: NEGATIVE
CLARITY UR: CLEAR
COLOR UR: YELLOW
GLUCOSE UR STRIP-MCNC: NEGATIVE MG/DL
HGB UR QL STRIP.AUTO: NEGATIVE
KETONES UR QL STRIP: NEGATIVE
LEUKOCYTE ESTERASE UR QL STRIP.AUTO: NEGATIVE
NITRITE UR QL STRIP: NEGATIVE
PH UR STRIP.AUTO: 6 [PH] (ref 5–8)
PROT UR QL STRIP: NEGATIVE
SP GR UR STRIP: 1.01 (ref 1–1.03)
UROBILINOGEN UR QL STRIP: NORMAL

## 2018-02-17 PROCEDURE — 99218 PR INITIAL OBSERVATION CARE/DAY 30 MINUTES: CPT | Performed by: OBSTETRICS & GYNECOLOGY

## 2018-02-17 PROCEDURE — G0378 HOSPITAL OBSERVATION PER HR: HCPCS

## 2018-02-17 PROCEDURE — 81003 URINALYSIS AUTO W/O SCOPE: CPT | Performed by: OBSTETRICS & GYNECOLOGY

## 2018-02-17 NOTE — PROGRESS NOTES
"[facility]  Obstetric History and Physical    Chief Complaint   Patient presents with   • Contractions     Feels like menstrual cramps, lower abdomen, started yesterday at 1015   • Back Pain     Feels like a burning sensation that comes and goes, \"when my sides tense up\" is when this happens   • Vaginal Discharge     After the most intense cramping yesterday, states \"water squirted out\" of my vagina   • Decreased Fetal Movement     Started yesterday, \"all day yesterday\"       Subjective     Patient is a 22 y.o. female  currently at 22w5d, who presents with c/o .  Abdominal pain.  She reports mild irregular menstrual-like cramping over the past 2 days without associated leaking of fluid or vaginal bleeding.  Patient denies any recent trauma or precipitating factors.  Patient also admits to having some decreased fetal movement yesterday but today expressing normal fetal activity.  No care with Dr. MIJARES, located by a family history of congenital heart defect    Her prenatal care is complicated by  .  Her previous obstetric/gynecological history is noted for is remarkable for .    The following portions of the patients history were reviewed and updated as appropriate: current medications, allergies, past medical history, past surgical history, past family history, past social history and problem list .       Prenatal Information:   Maternal Prenatal Labs  Blood Type No results found for: ABO   Rh Status No results found for: RH   Antibody Screen No results found for: ABSCRN   Gonnorhea No results found for: GCCX   Chlamydia No results found for: CLAMYDCU   RPR No results found for: RPR   Syphilis Antibody No results found for: SYPHILIS   Rubella No results found for: RUBELLAIGGIN   Hepatitis B Surface Antigen No results found for: HEPBSAG   HIV-1 Antibody No results found for: LABHIV1   Hepatitis C Antibody No results found for: HEPCAB   Rapid Urin Drug Screen No results found for: JORGE LIRA, " "LABBENZSCN, LABMETHSCN, LABOPIASCN, THCURSCR, COCAINEUR, AMPHETSCREEN, PROPOXSCN, BUPRENORSCNU, METAMPSCNUR, OXYCODONESCN, TRICYCLICSCN   Group B Strep Culture No results found for: GBSANTIGEN                 Past OB History:       Obstetric History       T0      L0     SAB1   TAB0   Ectopic0   Multiple0   Live Births0       # Outcome Date GA Lbr Alan/2nd Weight Sex Delivery Anes PTL Lv   2 Current            1 2017                  Past Medical History: Past Medical History:   Diagnosis Date   • Attention deficit hyperactivity disorder (ADHD), combined type     Off meds after 1-2 years   • Hypothyroid     Resolved after ~ 3 years of Rxs   • Migraine     \"When I was younger\"  Last episode was one week ago   • Pelvic adhesive disease 2016      Past Surgical History Past Surgical History:   Procedure Laterality Date   • LAPAROSCOPIC APPENDECTOMY     • LAPAROSCOPIC LYSIS OF ADHESIONS  2016    Findings consistent with chronic PID   • NASAL SEPTUM SURGERY     • SD CATH/INJECT HYSTEROSALPINGOGRAM  2017     Uterus normal; right adnexa normal; left tube did not demonstrate fill or spill   • TONSILLECTOMY AND ADENOIDECTOMY        Family History: Family History   Problem Relation Age of Onset   • Coronary artery disease Paternal Grandmother    • Diabetes Paternal Grandmother    • Hypertension Paternal Grandmother       Social History:  reports that she quit smoking about 9 months ago. Her smoking use included Cigarettes. She started smoking about 6 years ago. She smoked 0.25 packs per day. She has never used smokeless tobacco.   reports that she does not drink alcohol.   reports that she does not use illicit drugs.                   General ROS Negative Findings:Headaches, Visual Changes, Epigastric pain, Anorexia, Nausia/Vomiting, ROM and Vaginal Bleeding    ROS      Objective       Vital Signs Range for the last 24 hours  Temperature:     Temp Source:     BP:     Pulse: "     Respirations:     SPO2:     O2 Amount (l/min):     O2 Devices     Weight: Weight:  [83 kg (183 lb)] 83 kg (183 lb)     Physical Examination:   General:   alert, appears stated age and cooperative   Skin:   normal   HEENT:     Lungs:   clear to auscultation bilaterally   Heart:   regular rate and rhythm, S1, S2 normal, no murmur, click, rub or gallop   Abdomen:  SOFT, avid uterus nontender, no guarding, rebound, negative CVA tenderness.     Lower Extremeties  No edema, no calf tenderness, DTRs 2+ over 4 no clonus    Pelvis:  External genitalia: normal general appearance  Uterus: enlarged         Presentation:    Cervix: Exam by: Method: sterile exam per physician (Dr. Alfaro)   Dilation: Dilation: 0   Effacement:  TH   Station:  ne       Fetal Heart Rate Assessment   Method:     Beats/min:     Baseline:     Varibility:     Accels:     Decels:     Tracing Category:       Uterine Assessment   Method:     Frequency (min):     Ctx Count in 10 min:     Duration:     Intensity:     Intensity by IUPC:     Resting Tone: Uterine Resting Tone: soft by palpation   Resting Tone by IUPC:     [place] Units:       Laboratory Results: @[id number]@       Radiology Review:@lastrad@  Other Studies:    Assessment/Plan     Active Problems:    Pregnancy        Assessment:  1.  Intrauterine pregnancy at 22w5d weeks gestation with reassuring fetal status.    2.  Discomforts of pregnancy  3.   4.      Plan:  1. discharge to home, F/U OB routine or prn  2. Plan of care has been reviewed with patient.  3.  Risks, benefits of treatment plan have been discussed.  4.  All questions have been answered.  5      Juan Pablo Alfaro,   2/17/2018  1:41 PM

## 2018-02-22 ENCOUNTER — TELEPHONE (OUTPATIENT)
Dept: OBSTETRICS AND GYNECOLOGY | Facility: CLINIC | Age: 23
End: 2018-02-22

## 2018-02-22 DIAGNOSIS — R39.9 UTI SYMPTOMS: Primary | ICD-10-CM

## 2018-02-22 LAB
BILIRUB BLD-MCNC: NEGATIVE MG/DL
CLARITY, POC: CLEAR
COLOR UR: YELLOW
GLUCOSE UR STRIP-MCNC: NEGATIVE MG/DL
KETONES UR QL: NEGATIVE
LEUKOCYTE EST, POC: ABNORMAL
NITRITE UR-MCNC: NEGATIVE MG/ML
PH UR: 7 [PH] (ref 5–8)
PROT UR STRIP-MCNC: NEGATIVE MG/DL
RBC # UR STRIP: NEGATIVE /UL
SP GR UR: 1.01 (ref 1–1.03)
UROBILINOGEN UR QL: NORMAL

## 2018-02-22 PROCEDURE — 81002 URINALYSIS NONAUTO W/O SCOPE: CPT | Performed by: OBSTETRICS & GYNECOLOGY

## 2018-02-22 NOTE — TELEPHONE ENCOUNTER
Dr. Romero patient 23w3d complains of UTI +  [phone] c/o after urinating she is having a burning sensation in her back and sides ongoing for about 2 weeks. Advised patient she needs to come to the office to leave a urine sample so we can run it for infections. Patient states she will head on to the office.

## 2018-02-22 NOTE — TELEPHONE ENCOUNTER
Provider Name:Dr Romero  Reason for Call: UTI and 24 weeks  Pharmacy Name:[facility]  Call Back Number:[phone]

## 2018-02-22 NOTE — TELEPHONE ENCOUNTER
Per Dr. Romero patient's U/A is normal.   [phone] spoke with patient and advised her that her U/A is normal. Patient verbalized understanding.

## 2018-03-01 ENCOUNTER — OFFICE VISIT (OUTPATIENT)
Dept: OBSTETRICS AND GYNECOLOGY | Facility: HOSPITAL | Age: 23
End: 2018-03-01

## 2018-03-01 ENCOUNTER — ROUTINE PRENATAL (OUTPATIENT)
Dept: OBSTETRICS AND GYNECOLOGY | Facility: CLINIC | Age: 23
End: 2018-03-01

## 2018-03-01 ENCOUNTER — HOSPITAL ENCOUNTER (OUTPATIENT)
Dept: WOMENS IMAGING | Facility: HOSPITAL | Age: 23
Discharge: HOME | End: 2018-03-01
Attending: OBSTETRICS & GYNECOLOGY | Admitting: OBSTETRICS & GYNECOLOGY

## 2018-03-01 VITALS — BODY MASS INDEX: 34.93 KG/M2 | SYSTOLIC BLOOD PRESSURE: 121 MMHG | WEIGHT: 191 LBS | DIASTOLIC BLOOD PRESSURE: 79 MMHG

## 2018-03-01 DIAGNOSIS — O09.299 PREVIOUS PREGNANCY WITH CONGENITAL HEART DEFECT, CURRENTLY PREGNANT: Primary | ICD-10-CM

## 2018-03-01 DIAGNOSIS — Z34.82 PRENATAL CARE, SUBSEQUENT PREGNANCY IN SECOND TRIMESTER: Primary | ICD-10-CM

## 2018-03-01 DIAGNOSIS — O09.299 PREVIOUS PREGNANCY WITH CONGENITAL HEART DEFECT, CURRENTLY PREGNANT: ICD-10-CM

## 2018-03-01 DIAGNOSIS — Z3A.24 24 WEEKS GESTATION OF PREGNANCY: ICD-10-CM

## 2018-03-01 PROBLEM — O28.3 ECHOGENIC INTRACARDIAC FOCUS OF FETUS ON PRENATAL ULTRASOUND: Status: ACTIVE | Noted: 2018-03-01

## 2018-03-01 PROCEDURE — 76825 ECHO EXAM OF FETAL HEART: CPT

## 2018-03-01 PROCEDURE — 93325 DOPPLER ECHO COLOR FLOW MAPG: CPT | Performed by: OBSTETRICS & GYNECOLOGY

## 2018-03-01 PROCEDURE — 99213 OFFICE O/P EST LOW 20 MIN: CPT | Performed by: OBSTETRICS & GYNECOLOGY

## 2018-03-01 PROCEDURE — 93325 DOPPLER ECHO COLOR FLOW MAPG: CPT

## 2018-03-01 PROCEDURE — 76811 OB US DETAILED SNGL FETUS: CPT

## 2018-03-01 PROCEDURE — 76825 ECHO EXAM OF FETAL HEART: CPT | Performed by: OBSTETRICS & GYNECOLOGY

## 2018-03-01 PROCEDURE — 76811 OB US DETAILED SNGL FETUS: CPT | Performed by: OBSTETRICS & GYNECOLOGY

## 2018-03-01 NOTE — PROGRESS NOTES
No genetic testing done.  Denies any complaints.  LEONID's first child born with HLHS.  at one month of age. His 2 other sons born w/o problems.  To see Dr. Romero today.

## 2018-03-01 NOTE — PROGRESS NOTES
Documentation of the ultasound findings, images, and interpretations with be available in the patient's Viewpoint report located in the Chart Review Imaging tab in [employer].

## 2018-03-01 NOTE — PROGRESS NOTES
Chief Complaint   Patient presents with   • Routine Prenatal Visit       HPI: Tatianna is a  currently at 24w3d who today reports the following:  Contractions - No; Leaking - No; Vaginal bleeding -  No; Heartburn - No.  Getting  next week    ROS:  GI: Nausea - No ; Constipation - No; Diarrhea - No    Neuro: Headache - No ; Visual change - No      EXAM:  Vitals: See prenatal flowsheet   Abdomen: See prenatal flowsheet   Urine glucose/protein: See prenatal flowsheet   Pelvic: See prenatal flowsheet     Lab Results   Component Value Date    ABO O 2017    RH Positive 2017    ABSCRN Negative 2017       MDM:  Impression: 1. Supervision of low risk pregnancy   Tests done today: 1. U/S for anatomic screening - U/S report is not available for review at this time   Topics discussed: 1. Continue with PNV's  2. Prenatal labs reviewed  3. none - she had no major complaints,questions or concerns   Tests scheduled today for her next visit:   GCT  HgB   Next visit: See prenatal flowsheet

## 2018-03-12 ENCOUNTER — TELEPHONE (OUTPATIENT)
Dept: OBSTETRICS AND GYNECOLOGY | Facility: CLINIC | Age: 23
End: 2018-03-12

## 2018-03-12 NOTE — TELEPHONE ENCOUNTER
Probably normal.  Have her try some antacids and see if that helps.  She may also want to try something like Zantac.  If her symptoms persist tomorrow, I do think she needs to come in to have her blood pressure checked to make sure this is not preeclampsia.

## 2018-03-12 NOTE — TELEPHONE ENCOUNTER
Provider Name Nick    Reason for Call Pt is about 26 weeks and is having tightness in her chest - she also had swelling feet and ankles, some nausea    Pharmacy Name [facility]    Call Back Number [phone]

## 2018-03-21 ENCOUNTER — ROUTINE PRENATAL (OUTPATIENT)
Dept: OBSTETRICS AND GYNECOLOGY | Facility: CLINIC | Age: 23
End: 2018-03-21

## 2018-03-21 ENCOUNTER — TELEPHONE (OUTPATIENT)
Dept: OBSTETRICS AND GYNECOLOGY | Facility: CLINIC | Age: 23
End: 2018-03-21

## 2018-03-21 VITALS — WEIGHT: 197 LBS | SYSTOLIC BLOOD PRESSURE: 128 MMHG | DIASTOLIC BLOOD PRESSURE: 80 MMHG | BODY MASS INDEX: 36.03 KG/M2

## 2018-03-21 DIAGNOSIS — O28.3 ECHOGENIC INTRACARDIAC FOCUS OF FETUS ON PRENATAL ULTRASOUND: ICD-10-CM

## 2018-03-21 DIAGNOSIS — Z34.82 PRENATAL CARE, SUBSEQUENT PREGNANCY IN SECOND TRIMESTER: Primary | ICD-10-CM

## 2018-03-21 DIAGNOSIS — O09.299 PREVIOUS PREGNANCY WITH CONGENITAL HEART DEFECT, CURRENTLY PREGNANT: ICD-10-CM

## 2018-03-21 PROCEDURE — 99213 OFFICE O/P EST LOW 20 MIN: CPT | Performed by: OBSTETRICS & GYNECOLOGY

## 2018-03-21 NOTE — TELEPHONE ENCOUNTER
Provider Name   Dr. Romero    Reason for Call  Pt is about 26 weeks   Pt thinks the tightness she has been having in  her chest is due to anxiety.  She is also having tingling in your arms and legs.    Pt states she has a Hx of anxiety and depression but has not been on medication for it.  Pt states she has NO feelings of doing harm to herself.  Pt feels depressed and anxious throughout the day.  She just wants to sleep.    Pt would like Dr. Romero to call her.    Pharmacy Name   [facility]    Call Back Number  [phone]
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
## 2018-03-29 ENCOUNTER — LAB (OUTPATIENT)
Dept: LAB | Facility: HOSPITAL | Age: 23
End: 2018-03-29
Attending: OBSTETRICS & GYNECOLOGY

## 2018-03-29 ENCOUNTER — HOSPITAL ENCOUNTER (OUTPATIENT)
Facility: HOSPITAL | Age: 23
Discharge: HOME | End: 2018-03-29
Attending: OBSTETRICS & GYNECOLOGY | Admitting: OBSTETRICS & GYNECOLOGY

## 2018-03-29 ENCOUNTER — ROUTINE PRENATAL (OUTPATIENT)
Dept: OBSTETRICS AND GYNECOLOGY | Facility: CLINIC | Age: 23
End: 2018-03-29

## 2018-03-29 ENCOUNTER — TRANSCRIBE ORDERS (OUTPATIENT)
Dept: LAB | Facility: HOSPITAL | Age: 23
End: 2018-03-29

## 2018-03-29 VITALS — DIASTOLIC BLOOD PRESSURE: 74 MMHG | BODY MASS INDEX: 36.21 KG/M2 | SYSTOLIC BLOOD PRESSURE: 122 MMHG | WEIGHT: 198 LBS

## 2018-03-29 VITALS
TEMPERATURE: 98.8 F | SYSTOLIC BLOOD PRESSURE: 138 MMHG | RESPIRATION RATE: 16 BRPM | DIASTOLIC BLOOD PRESSURE: 93 MMHG | HEART RATE: 74 BPM

## 2018-03-29 DIAGNOSIS — Z34.82 PRENATAL CARE, SUBSEQUENT PREGNANCY IN SECOND TRIMESTER: Primary | ICD-10-CM

## 2018-03-29 DIAGNOSIS — Z34.82 PRENATAL CARE, SUBSEQUENT PREGNANCY, SECOND TRIMESTER: Primary | ICD-10-CM

## 2018-03-29 DIAGNOSIS — Z34.82 PRENATAL CARE, SUBSEQUENT PREGNANCY IN SECOND TRIMESTER: ICD-10-CM

## 2018-03-29 DIAGNOSIS — O28.3 ECHOGENIC INTRACARDIAC FOCUS OF FETUS ON PRENATAL ULTRASOUND: ICD-10-CM

## 2018-03-29 DIAGNOSIS — O09.299 PREVIOUS PREGNANCY WITH CONGENITAL HEART DEFECT, CURRENTLY PREGNANT: ICD-10-CM

## 2018-03-29 LAB
BACTERIA UR QL AUTO: ABNORMAL /HPF
BILIRUB UR QL STRIP: NEGATIVE
CLARITY UR: CLEAR
COLOR UR: YELLOW
GLUCOSE 1H P 100 G GLC PO SERPL-MCNC: 86 MG/DL (ref 65–140)
GLUCOSE BLDC GLUCOMTR-MCNC: 107 MG/DL
GLUCOSE UR STRIP-MCNC: NEGATIVE MG/DL
HCT VFR BLD AUTO: 39.4 % (ref 34.5–44)
HGB BLD-MCNC: 13.4 G/DL (ref 11.5–15.5)
HGB UR QL STRIP.AUTO: NEGATIVE
HYALINE CASTS UR QL AUTO: ABNORMAL /LPF
KETONES UR QL STRIP: ABNORMAL
LEUKOCYTE ESTERASE UR QL STRIP.AUTO: ABNORMAL
NITRITE UR QL STRIP: NEGATIVE
PH UR STRIP.AUTO: 5.5 [PH] (ref 5–8)
PROT UR QL STRIP: ABNORMAL
RBC # UR: ABNORMAL /HPF
REF LAB TEST METHOD: ABNORMAL
SP GR UR STRIP: 1.03 (ref 1–1.03)
SQUAMOUS #/AREA URNS HPF: ABNORMAL /HPF
UROBILINOGEN UR QL STRIP: ABNORMAL
WBC UR QL AUTO: ABNORMAL /HPF

## 2018-03-29 PROCEDURE — 85014 HEMATOCRIT: CPT

## 2018-03-29 PROCEDURE — 81001 URINALYSIS AUTO W/SCOPE: CPT | Performed by: OBSTETRICS & GYNECOLOGY

## 2018-03-29 PROCEDURE — 36415 COLL VENOUS BLD VENIPUNCTURE: CPT

## 2018-03-29 PROCEDURE — 99213 OFFICE O/P EST LOW 20 MIN: CPT | Performed by: OBSTETRICS & GYNECOLOGY

## 2018-03-29 PROCEDURE — G0463 HOSPITAL OUTPT CLINIC VISIT: HCPCS

## 2018-03-29 PROCEDURE — 87086 URINE CULTURE/COLONY COUNT: CPT | Performed by: OBSTETRICS & GYNECOLOGY

## 2018-03-29 PROCEDURE — 82962 GLUCOSE BLOOD TEST: CPT | Performed by: OBSTETRICS & GYNECOLOGY

## 2018-03-29 PROCEDURE — 82950 GLUCOSE TEST: CPT

## 2018-03-29 PROCEDURE — 84112 EVAL AMNIOTIC FLUID PROTEIN: CPT | Performed by: OBSTETRICS & GYNECOLOGY

## 2018-03-29 PROCEDURE — 85018 HEMOGLOBIN: CPT

## 2018-03-29 PROCEDURE — 59025 FETAL NON-STRESS TEST: CPT

## 2018-03-29 NOTE — PROGRESS NOTES
Chief Complaint   Patient presents with   • Routine Prenatal Visit       HPI: Tatianna is a  currently at 28w3d who today reports the following:  Contractions - No; Leaking - No; Vaginal bleeding -  No; Heartburn - No.    ROS:  GI: Nausea - No ; Constipation - No; Diarrhea - No    Neuro: Headache - No ; Visual change - No      EXAM:  Vitals: See prenatal flowsheet   Abdomen: See prenatal flowsheet   Urine glucose/protein: See prenatal flowsheet   Pelvic: See prenatal flowsheet     Lab Results   Component Value Date    ABO O 2017    RH Positive 2017    ABSCRN Negative 2017       MDM:  Impression: 1. Supervision of high risk pregnancy  2. LVEIF   Tests done today: 1. GCT  2. HgB   Topics discussed: 1. Continue with PNV's  2. Prenatal labs reviewed  3. childbirth classes  4. TDAP vaccination   Tests scheduled today for her next visit:   none   Next visit: See prenatal flowsheet

## 2018-03-30 ENCOUNTER — TELEPHONE (OUTPATIENT)
Dept: OBSTETRICS AND GYNECOLOGY | Facility: CLINIC | Age: 23
End: 2018-03-30

## 2018-03-30 ENCOUNTER — DOCUMENTATION (OUTPATIENT)
Dept: OBSTETRICS AND GYNECOLOGY | Facility: CLINIC | Age: 23
End: 2018-03-30

## 2018-03-30 ENCOUNTER — LAB (OUTPATIENT)
Dept: LAB | Facility: HOSPITAL | Age: 23
End: 2018-03-30

## 2018-03-30 DIAGNOSIS — J11.1 INFLUENZA: ICD-10-CM

## 2018-03-30 DIAGNOSIS — J11.1 INFLUENZA: Primary | ICD-10-CM

## 2018-03-30 LAB
FLUAV SUBTYP SPEC NAA+PROBE: NOT DETECTED
FLUBV RNA ISLT QL NAA+PROBE: NOT DETECTED
POC AMNISURE: NEGATIVE

## 2018-03-30 PROCEDURE — 87502 INFLUENZA DNA AMP PROBE: CPT

## 2018-03-30 RX ORDER — PROMETHAZINE HYDROCHLORIDE 25 MG/1
25 TABLET ORAL EVERY 6 HOURS PRN
Qty: 30 TABLET | Refills: 0 | Status: SHIPPED | OUTPATIENT
Start: 2018-03-30 | End: 2018-04-18

## 2018-03-30 NOTE — TELEPHONE ENCOUNTER
Provider Name: Nick  Reason for Call: Wanted to let you know she went and got her Flu test.  Pharmacy Name  Call Back Number

## 2018-03-30 NOTE — TELEPHONE ENCOUNTER
Provider Name  DR MIJARES    Reason for Call  WENT TO [facility] LAST NIGHT FOR POSSIBLE CONTRACTIONS, WAS DEHYDRATED, WENT HOME, TWO HOURS LATER BEGAN VOMITING , COLD CHILLS, FEVER .1, LEGS HURTING, MUSCLE ACHES, FEELS LIKE THE FLU     PATIENT HAD TDAP VACCINE YESTERDAY AT [facility] [place], [place]   DR GRIFFITH CALLED IN [facility] LAST NIGHT AND PATIENT TOOK TYLENOL, FEVER .5    Pharmacy Name  [facility] ON [address]    Call Back Number  [phone]

## 2018-03-30 NOTE — NURSING NOTE
Pt discharged undelivered. RN reviewed discharge instructions with pt. Pt verbalized understanding and denied questions. Pt denied leaking of fluid and/or vaginal bleeding at time of discharge and has good fetal movement. Pt to keep regular prenatal appointments. Pt ambulated to discharge with belongings accompanied by spouse.

## 2018-03-30 NOTE — PROGRESS NOTES
yusra called after waking up at 1 am with nausea & vomiting and fever 100.1   She was just at L&D last night for uterine irritability  She thinks she has the flu but no cough or respiratory symptoms only n/v  She agrees to try phenergan and fluids and rest ; Rx sent to [facility]   If no better office or L&D

## 2018-03-30 NOTE — H&P
[facility]  Obstetric History and Physical    Chief Complaint   Patient presents with   • Contractions     began around 1900, every 2 minutes       Subjective     Patient is a 22 y.o. female  currently at 28w3d, who presents with increasing lower abdominal pain and occasional lower back pain.  She had seen Dr. Romero earlier today and did her GTT (past).  She also was more active today and doing a lot of errands.  She says she felt some mild discomfort earlier this afternoon, but woresened about 19:00.  She also thought she may have had a gush of fluid around the same time.  +FM.  Denies urinary symptoms.  No bleeding.  Her urine showed significant dehydration so she was given 2 pitchers of water and had significant improvement of symptome.  She also felt better knowing her cervix was not dilated and she is not ruptured.   She now feels well enough to go home        The following portions of the patients history were reviewed and updated as appropriate: current medications, allergies, past medical history, past surgical history, past family history, past social history and problem list .       Prenatal Information:   Maternal Prenatal Labs  Blood Type No results found for: ABO   Rh Status No results found for: RH   Antibody Screen No results found for: ABSCRN   Gonnorhea No results found for: GCCX   Chlamydia No results found for: CLAMYDCU   RPR No results found for: RPR   Syphilis Antibody No results found for: SYPHILIS   Rubella No results found for: RUBELLAIGGIN   Hepatitis B Surface Antigen No results found for: HEPBSAG   HIV-1 Antibody No results found for: LABHIV1   Hepatitis C Antibody No results found for: HEPCAB   Rapid Urin Drug Screen No results found for: AMPMETHU, BARBITSCNUR, LABBENZSCN, LABMETHSCN, LABOPIASCN, THCURSCR, COCAINEUR, AMPHETSCREEN, PROPOXSCN, BUPRENORSCNU, METAMPSCNUR, OXYCODONESCN, TRICYCLICSCN   Group B Strep Culture No results found for: GBSANTIGEN           External  Prenatal Results         Pregnancy Outside Results - these were transcribed from office records.  See scanned records for details. Date Time   Hgb  13.4 g/dL 18 0923   Hct  39.4 % 18 0923   ABO  O  17 1429   Rh  Positive  17 1429   Antibody Screen  Negative  17 1429   Glucose Fasting GTT      Glucose Tolerance Test 1 hour      Glucose Tolerance Test 3 hour      Gonorrhea (discrete) ^ negative  17    Chlamydia (discrete) ^ negative  17    RPR  Non-Reactive  17 1429   VDRL      Syphillis Antibody      Rubella  71.6 IU/mL 17 1429      Immune  17 1429   HBsAg  Non-Reactive  17 1429   Herpes Simplex Virus PCR      Herpes Simplex VIrus Culture      HIV  Non-Reactive  17 1429   Hep C RNA Quant PCR      Hep C Antibody      AFP      Group B Strep      GBS Susceptibility to Clindamycin      GBS Susceptibility to Eythromycin      Fetal Fibronectin      Genetic Testing, Maternal Blood      Drug Screening Date Time   Urine Drug Screen      Amphetamine Screen  Negative  17 1430   Barbiturate Screen  Negative  17 1430   Benzodiazepine Screen  Negative  17 1430   Methadone Screen  Negative  17 1430   Phencyclidine Screen  Negative  17 1430   Opiates Screen  Negative  17 1430   THC Screen  Negative  17 1430   Cocaine Screen      Propoxyphene Screen  Negative  17 1430   Buprenorphine Screen  Negative  17 1430   Methamphetamine Screen      Oxycodone Screen  Negative  17 1430   Tryicyclic Antidepressants Screen  Negative  17 1430             Legend: ^: Historical                       Past OB History:     Obstetric History       T0      L0     SAB1   TAB0   Ectopic0   Molar0   Multiple0   Live Births0       # Outcome Date GA Lbr Alan/2nd Weight Sex Delivery Anes PTL Lv   2 Current            1 2017 6w0d                 Past Medical History: Past Medical History:   Diagnosis Date  "  • Attention deficit hyperactivity disorder (ADHD), combined type 2012    Off meds after 1-2 years   • Hypothyroid 2006    Resolved after ~ 3 years of Rxs   • Migraine     \"When I was younger\"  Last episode was one week ago   • Pelvic adhesive disease 12/2016   • Scoliosis       Past Surgical History Past Surgical History:   Procedure Laterality Date   • LAPAROSCOPIC APPENDECTOMY  2011   • LAPAROSCOPIC LYSIS OF ADHESIONS  12/20/2016    Findings consistent with chronic PID   • NASAL SEPTUM SURGERY  2009   • [place] CATH/INJECT HYSTEROSALPINGOGRAM  06/28/2017     Uterus normal; right adnexa normal; left tube did not demonstrate fill or spill   • TONSILLECTOMY AND ADENOIDECTOMY  2003      Family History: Family History   Problem Relation Age of Onset   • Coronary artery disease Paternal Grandmother    • Diabetes Paternal Grandmother    • Hypertension Paternal Grandmother       Social History:  reports that she quit smoking about 10 months ago. Her smoking use included Cigarettes. She started smoking about 6 years ago. She smoked 0.25 packs per day. She has never used smokeless tobacco.   reports that she does not drink alcohol.   reports that she does not use drugs.        Review of Systems  Denies fever, HA, CP, Shortness of air, muscle weakness,                                             and rashes      Objective     Vital Signs Range for the last 24 hours  Temperature: Temp:  [98.8 °F (37.1 °C)] 98.8 °F (37.1 °C)   Temp Source: Temp src: Oral   BP: BP: (122-138)/(74-93) 138/93   Pulse: Heart Rate:  [74] 74   Respirations: Resp:  [16] 16   SPO2:     O2 Amount (l/min):     O2 Devices     Weight: Weight:  [89.8 kg (198 lb)] 89.8 kg (198 lb)     Physical Examination: General appearance - alert, well appearing, and in no distress and oriented to person, place, and time  Chest - clear to auscultation, no wheezes, rales or rhonchi, symmetric air entry  Heart - S1 and S2 normal, no murmurs noted  Abdomen - soft, Mild " tenderness in the lower abdomen around round ligament area, nondistended, no masses or organomegaly  no rebound tenderness noted  bowel sounds normal  No guarding, No RUQ pain  Extremities - Non-tender    Presentation: Breech   Cervix: Exam by: Method: sterile exam per physician   Dilation:  closed   Effacement:  thick   Station:  high     Fetal Heart Rate Assessment   Method: Fetal HR Assessment Method: external   Beats/min: Fetal HR (beats/min): 145   Baseline: Fetal Heart Baseline Rate: normal range   Varibility: Fetal HR Variability: minimal (detectable, amplitude less than or equal to 5 bpm)   Accels: Fetal HR Accelerations: absent   Decels: Fetal HR Decelerations: absent   Tracing Category:       Uterine Assessment   Method: Method: external tocotransducer   Frequency (min): Contraction Frequency (Minutes): irritability   Ctx Count in 10 min:     Duration:     Intensity:     Intensity by IUPC:     Resting Tone: Uterine Resting Tone: soft by palpation   Resting Tone by IUPC:         Laboratory Results:   Lab Results   Component Value Date    SQUAMEPIUA 7-12 (A) 03/29/2018    SPECGRAVUR 1.033 (H) 03/29/2018    KETONESU Trace (A) 03/29/2018    BLOODU Negative 03/29/2018    LEUKOCYTESUR Trace (A) 03/29/2018    NITRITEU Negative 03/29/2018    RBCUA 7-12 (A) 03/29/2018    WBCUA 13-20 (A) 03/29/2018    BACTERIA Trace 03/29/2018             Assessment:  1. Intrauterine pregnancy at 28w3d weeks gestation with reassuring fetal status  2. Round ligament discomfort    Plan:  1. Reassuring fetal status  2. No signs or symptoms of PROM or MARTHA  3.  Reassurance and PO hydration.   Pain improved   4.  All questions have been answered.  5.  D/C home with routine follow-up      Toño Jack MD  3/29/2018  11:01 PM

## 2018-04-01 LAB
BACTERIA SPEC AEROBE CULT: NORMAL
BACTERIA SPEC AEROBE CULT: NORMAL

## 2018-04-02 ENCOUNTER — TELEPHONE (OUTPATIENT)
Dept: OBSTETRICS AND GYNECOLOGY | Facility: CLINIC | Age: 23
End: 2018-04-02

## 2018-04-02 NOTE — TELEPHONE ENCOUNTER
Provider Name: Nick  Reason for Call: Patient is calling to state she hasn't had a urine test and she also states she hasn't had a UTI.  Pharmacy Name  Call Back Number: [phone]

## 2018-04-08 ENCOUNTER — HOSPITAL ENCOUNTER (EMERGENCY)
Facility: HOSPITAL | Age: 23
End: 2018-04-08

## 2018-04-08 ENCOUNTER — HOSPITAL ENCOUNTER (INPATIENT)
Facility: HOSPITAL | Age: 23
LOS: 6 days | Discharge: HOME | End: 2018-04-14
Attending: OBSTETRICS & GYNECOLOGY | Admitting: OBSTETRICS & GYNECOLOGY

## 2018-04-08 DIAGNOSIS — N28.9 RENAL INSUFFICIENCY AFFECTING PREGNANCY IN THIRD TRIMESTER: ICD-10-CM

## 2018-04-08 DIAGNOSIS — O26.833 RENAL INSUFFICIENCY AFFECTING PREGNANCY IN THIRD TRIMESTER: ICD-10-CM

## 2018-04-08 DIAGNOSIS — O36.5930 POOR FETAL GROWTH AFFECTING MANAGEMENT OF MOTHER IN THIRD TRIMESTER, SINGLE OR UNSPECIFIED FETUS: ICD-10-CM

## 2018-04-08 DIAGNOSIS — O14.13 SEVERE PREECLAMPSIA, THIRD TRIMESTER: ICD-10-CM

## 2018-04-08 DIAGNOSIS — O14.93 PRE-ECLAMPSIA IN THIRD TRIMESTER: ICD-10-CM

## 2018-04-08 PROBLEM — O14.90 PRE-ECLAMPSIA: Status: ACTIVE | Noted: 2018-04-08

## 2018-04-08 LAB
ABO GROUP BLD: NORMAL
ALBUMIN SERPL-MCNC: 3.8 G/DL (ref 3.2–4.8)
ALBUMIN/GLOB SERPL: 1.5 G/DL (ref 1.5–2.5)
ALP SERPL-CCNC: 153 U/L (ref 25–100)
ALP SERPL-CCNC: 155 U/L (ref 25–100)
ALT SERPL W P-5'-P-CCNC: 27 U/L (ref 7–40)
ALT SERPL W P-5'-P-CCNC: 27 U/L (ref 7–40)
AMYLASE SERPL-CCNC: 50 U/L (ref 30–118)
ANION GAP SERPL CALCULATED.3IONS-SCNC: 14 MMOL/L (ref 3–11)
AST SERPL-CCNC: 31 U/L (ref 0–33)
AST SERPL-CCNC: 31 U/L (ref 0–33)
BACTERIA UR QL AUTO: ABNORMAL /HPF
BASOPHILS # BLD AUTO: 0.05 10*3/MM3 (ref 0–0.2)
BASOPHILS NFR BLD AUTO: 0.4 % (ref 0–1)
BILIRUB SERPL-MCNC: 0.4 MG/DL (ref 0.3–1.2)
BILIRUB SERPL-MCNC: 0.4 MG/DL (ref 0.3–1.2)
BILIRUB UR QL STRIP: NEGATIVE
BLD GP AB SCN SERPL QL: NEGATIVE
BUN BLD-MCNC: 16 MG/DL (ref 9–23)
BUN/CREAT SERPL: 17.8 (ref 7–25)
CALCIUM SPEC-SCNC: 9.2 MG/DL (ref 8.7–10.4)
CHLORIDE SERPL-SCNC: 102 MMOL/L (ref 99–109)
CLARITY UR: CLEAR
CO2 SERPL-SCNC: 22 MMOL/L (ref 20–31)
COLOR UR: YELLOW
CREAT BLD-MCNC: 0.9 MG/DL (ref 0.6–1.3)
CREAT BLD-MCNC: 0.92 MG/DL (ref 0.6–1.3)
DEPRECATED RDW RBC AUTO: 42.8 FL (ref 37–54)
EOSINOPHIL # BLD AUTO: 0.16 10*3/MM3 (ref 0–0.3)
EOSINOPHIL NFR BLD AUTO: 1.1 % (ref 0–3)
ERYTHROCYTE [DISTWIDTH] IN BLOOD BY AUTOMATED COUNT: 13.1 % (ref 11.3–14.5)
GFR SERPL CREATININE-BSD FRML MDRD: 77 ML/MIN/1.73
GLOBULIN UR ELPH-MCNC: 2.6 GM/DL
GLUCOSE BLD-MCNC: 89 MG/DL (ref 70–100)
GLUCOSE UR STRIP-MCNC: NEGATIVE MG/DL
HCT VFR BLD AUTO: 37.9 % (ref 34.5–44)
HGB BLD-MCNC: 13.1 G/DL (ref 11.5–15.5)
HGB UR QL STRIP.AUTO: ABNORMAL
HYALINE CASTS UR QL AUTO: ABNORMAL /LPF
IMM GRANULOCYTES # BLD: 0.07 10*3/MM3 (ref 0–0.03)
IMM GRANULOCYTES NFR BLD: 0.5 % (ref 0–0.6)
KETONES UR QL STRIP: NEGATIVE
LDH SERPL-CCNC: 332 U/L (ref 120–246)
LEUKOCYTE ESTERASE UR QL STRIP.AUTO: NEGATIVE
LIPASE SERPL-CCNC: 37 U/L (ref 6–51)
LYMPHOCYTES # BLD AUTO: 5.31 10*3/MM3 (ref 0.6–4.8)
LYMPHOCYTES NFR BLD AUTO: 37.5 % (ref 24–44)
MCH RBC QN AUTO: 31.7 PG (ref 27–31)
MCHC RBC AUTO-ENTMCNC: 34.6 G/DL (ref 32–36)
MCV RBC AUTO: 91.8 FL (ref 80–99)
MONOCYTES # BLD AUTO: 1.24 10*3/MM3 (ref 0–1)
MONOCYTES NFR BLD AUTO: 8.8 % (ref 0–12)
NEUTROPHILS # BLD AUTO: 7.33 10*3/MM3 (ref 1.5–8.3)
NEUTROPHILS NFR BLD AUTO: 51.7 % (ref 41–71)
NITRITE UR QL STRIP: NEGATIVE
PH UR STRIP.AUTO: 6.5 [PH] (ref 5–8)
PLATELET # BLD AUTO: 188 10*3/MM3 (ref 150–450)
PMV BLD AUTO: 13.1 FL (ref 6–12)
POTASSIUM BLD-SCNC: 4.3 MMOL/L (ref 3.5–5.5)
PROT SERPL-MCNC: 6.4 G/DL (ref 5.7–8.2)
PROT UR QL STRIP: ABNORMAL
RBC # BLD AUTO: 4.13 10*6/MM3 (ref 3.89–5.14)
RBC # UR: ABNORMAL /HPF
REF LAB TEST METHOD: ABNORMAL
RENAL EPI CELLS #/AREA URNS HPF: ABNORMAL /HPF
RH BLD: POSITIVE
SODIUM BLD-SCNC: 138 MMOL/L (ref 132–146)
SP GR UR STRIP: 1.01 (ref 1–1.03)
SQUAMOUS #/AREA URNS HPF: ABNORMAL /HPF
T&S EXPIRATION DATE: NORMAL
TRANS CELLS #/AREA URNS HPF: ABNORMAL /HPF
URATE SERPL-MCNC: 7.1 MG/DL (ref 3.1–7.8)
UROBILINOGEN UR QL STRIP: ABNORMAL
WBC NRBC COR # BLD: 14.16 10*3/MM3 (ref 3.5–10.8)
WBC UR QL AUTO: ABNORMAL /HPF

## 2018-04-08 PROCEDURE — 25010000002 PROMETHAZINE PER 50 MG: Performed by: OBSTETRICS & GYNECOLOGY

## 2018-04-08 PROCEDURE — 84460 ALANINE AMINO (ALT) (SGPT): CPT | Performed by: OBSTETRICS & GYNECOLOGY

## 2018-04-08 PROCEDURE — 86901 BLOOD TYPING SEROLOGIC RH(D): CPT | Performed by: OBSTETRICS & GYNECOLOGY

## 2018-04-08 PROCEDURE — 83690 ASSAY OF LIPASE: CPT | Performed by: OBSTETRICS & GYNECOLOGY

## 2018-04-08 PROCEDURE — 84075 ASSAY ALKALINE PHOSPHATASE: CPT | Performed by: OBSTETRICS & GYNECOLOGY

## 2018-04-08 PROCEDURE — 81001 URINALYSIS AUTO W/SCOPE: CPT | Performed by: OBSTETRICS & GYNECOLOGY

## 2018-04-08 PROCEDURE — 82247 BILIRUBIN TOTAL: CPT | Performed by: OBSTETRICS & GYNECOLOGY

## 2018-04-08 PROCEDURE — 59025 FETAL NON-STRESS TEST: CPT

## 2018-04-08 PROCEDURE — 82150 ASSAY OF AMYLASE: CPT | Performed by: OBSTETRICS & GYNECOLOGY

## 2018-04-08 PROCEDURE — 25010000002 BETAMETHASONE ACET & SOD PHOS PER 4 MG: Performed by: OBSTETRICS & GYNECOLOGY

## 2018-04-08 PROCEDURE — 80053 COMPREHEN METABOLIC PANEL: CPT | Performed by: OBSTETRICS & GYNECOLOGY

## 2018-04-08 PROCEDURE — 84550 ASSAY OF BLOOD/URIC ACID: CPT | Performed by: OBSTETRICS & GYNECOLOGY

## 2018-04-08 PROCEDURE — 99221 1ST HOSP IP/OBS SF/LOW 40: CPT | Performed by: OBSTETRICS & GYNECOLOGY

## 2018-04-08 PROCEDURE — 85025 COMPLETE CBC W/AUTO DIFF WBC: CPT | Performed by: OBSTETRICS & GYNECOLOGY

## 2018-04-08 PROCEDURE — 84450 TRANSFERASE (AST) (SGOT): CPT | Performed by: OBSTETRICS & GYNECOLOGY

## 2018-04-08 PROCEDURE — 83615 LACTATE (LD) (LDH) ENZYME: CPT | Performed by: OBSTETRICS & GYNECOLOGY

## 2018-04-08 PROCEDURE — 82565 ASSAY OF CREATININE: CPT | Performed by: OBSTETRICS & GYNECOLOGY

## 2018-04-08 PROCEDURE — 86850 RBC ANTIBODY SCREEN: CPT | Performed by: OBSTETRICS & GYNECOLOGY

## 2018-04-08 PROCEDURE — 25010000002 MORPHINE PER 10 MG: Performed by: OBSTETRICS & GYNECOLOGY

## 2018-04-08 PROCEDURE — 86900 BLOOD TYPING SEROLOGIC ABO: CPT | Performed by: OBSTETRICS & GYNECOLOGY

## 2018-04-08 RX ORDER — PROMETHAZINE HYDROCHLORIDE 25 MG/ML
12.5 INJECTION, SOLUTION INTRAMUSCULAR; INTRAVENOUS ONCE
Status: COMPLETED | OUTPATIENT
Start: 2018-04-08 | End: 2018-04-08

## 2018-04-08 RX ORDER — BETAMETHASONE SODIUM PHOSPHATE AND BETAMETHASONE ACETATE 3; 3 MG/ML; MG/ML
12 INJECTION, SUSPENSION INTRA-ARTICULAR; INTRALESIONAL; INTRAMUSCULAR; SOFT TISSUE EVERY 24 HOURS
Status: DISCONTINUED | OUTPATIENT
Start: 2018-04-08 | End: 2018-04-09

## 2018-04-08 RX ORDER — MAGNESIUM SULF/RINGERS LACTATE 40 G/500ML
2 PLASTIC BAG, INJECTION (ML) INTRAVENOUS CONTINUOUS
Status: DISCONTINUED | OUTPATIENT
Start: 2018-04-08 | End: 2018-04-09

## 2018-04-08 RX ORDER — PROMETHAZINE HYDROCHLORIDE 25 MG/ML
12.5 INJECTION, SOLUTION INTRAMUSCULAR; INTRAVENOUS EVERY 6 HOURS PRN
Status: DISCONTINUED | OUTPATIENT
Start: 2018-04-08 | End: 2018-04-09

## 2018-04-08 RX ORDER — PROMETHAZINE HYDROCHLORIDE 25 MG/ML
12.5 INJECTION, SOLUTION INTRAMUSCULAR; INTRAVENOUS EVERY 6 HOURS PRN
Status: DISCONTINUED | OUTPATIENT
Start: 2018-04-08 | End: 2018-04-11

## 2018-04-08 RX ORDER — LABETALOL HYDROCHLORIDE 5 MG/ML
20-80 INJECTION, SOLUTION INTRAVENOUS
Status: DISCONTINUED | OUTPATIENT
Start: 2018-04-08 | End: 2018-04-09

## 2018-04-08 RX ORDER — LIDOCAINE HYDROCHLORIDE 10 MG/ML
5 INJECTION, SOLUTION EPIDURAL; INFILTRATION; INTRACAUDAL; PERINEURAL AS NEEDED
Status: DISCONTINUED | OUTPATIENT
Start: 2018-04-08 | End: 2018-04-09

## 2018-04-08 RX ORDER — ACETAMINOPHEN 500 MG
1000 TABLET ORAL EVERY 6 HOURS PRN
Status: DISCONTINUED | OUTPATIENT
Start: 2018-04-08 | End: 2018-04-11

## 2018-04-08 RX ORDER — SODIUM CHLORIDE, SODIUM LACTATE, POTASSIUM CHLORIDE, CALCIUM CHLORIDE 600; 310; 30; 20 MG/100ML; MG/100ML; MG/100ML; MG/100ML
125 INJECTION, SOLUTION INTRAVENOUS CONTINUOUS
Status: DISCONTINUED | OUTPATIENT
Start: 2018-04-08 | End: 2018-04-09

## 2018-04-08 RX ORDER — DEXTROSE AND SODIUM CHLORIDE 5; .45 G/100ML; G/100ML
100 INJECTION, SOLUTION INTRAVENOUS CONTINUOUS
Status: DISCONTINUED | OUTPATIENT
Start: 2018-04-08 | End: 2018-04-09

## 2018-04-08 RX ORDER — BETAMETHASONE SODIUM PHOSPHATE AND BETAMETHASONE ACETATE 3; 3 MG/ML; MG/ML
12 INJECTION, SUSPENSION INTRA-ARTICULAR; INTRALESIONAL; INTRAMUSCULAR; SOFT TISSUE EVERY 24 HOURS
Status: DISPENSED | OUTPATIENT
Start: 2018-04-08 | End: 2018-04-10

## 2018-04-08 RX ORDER — LABETALOL HYDROCHLORIDE 5 MG/ML
20-80 INJECTION, SOLUTION INTRAVENOUS
Status: DISCONTINUED | OUTPATIENT
Start: 2018-04-08 | End: 2018-04-08

## 2018-04-08 RX ORDER — DIPHENHYDRAMINE HCL 25 MG
25 CAPSULE ORAL EVERY 6 HOURS PRN
COMMUNITY
End: 2018-04-18

## 2018-04-08 RX ORDER — SODIUM CHLORIDE 0.9 % (FLUSH) 0.9 %
1-10 SYRINGE (ML) INJECTION AS NEEDED
Status: DISCONTINUED | OUTPATIENT
Start: 2018-04-08 | End: 2018-04-09

## 2018-04-08 RX ORDER — MORPHINE SULFATE 10 MG/ML
15 INJECTION INTRAMUSCULAR; INTRAVENOUS; SUBCUTANEOUS ONCE
Status: COMPLETED | OUTPATIENT
Start: 2018-04-08 | End: 2018-04-08

## 2018-04-08 RX ORDER — ONDANSETRON 2 MG/ML
4 INJECTION INTRAMUSCULAR; INTRAVENOUS EVERY 6 HOURS PRN
Status: DISCONTINUED | OUTPATIENT
Start: 2018-04-08 | End: 2018-04-11

## 2018-04-08 RX ADMIN — DEXTROSE AND SODIUM CHLORIDE 999 ML/HR: 5; 450 INJECTION, SOLUTION INTRAVENOUS at 22:20

## 2018-04-08 RX ADMIN — LABETALOL HYDROCHLORIDE 40 MG: 5 INJECTION, SOLUTION INTRAVENOUS at 04:35

## 2018-04-08 RX ADMIN — SODIUM CHLORIDE, POTASSIUM CHLORIDE, SODIUM LACTATE AND CALCIUM CHLORIDE 125 ML/HR: 600; 310; 30; 20 INJECTION, SOLUTION INTRAVENOUS at 04:00

## 2018-04-08 RX ADMIN — PROMETHAZINE HYDROCHLORIDE 12.5 MG: 25 INJECTION INTRAMUSCULAR; INTRAVENOUS at 05:21

## 2018-04-08 RX ADMIN — ACETAMINOPHEN 1000 MG: 500 TABLET, FILM COATED ORAL at 17:11

## 2018-04-08 RX ADMIN — PROMETHAZINE HYDROCHLORIDE 12.5 MG: 25 INJECTION INTRAMUSCULAR; INTRAVENOUS at 19:58

## 2018-04-08 RX ADMIN — BETAMETHASONE SODIUM PHOSPHATE AND BETAMETHASONE ACETATE 12 MG: 3; 3 INJECTION, SUSPENSION INTRA-ARTICULAR; INTRALESIONAL; INTRAMUSCULAR at 05:41

## 2018-04-08 RX ADMIN — MORPHINE SULFATE 15 MG: 10 INJECTION INTRAVENOUS at 20:28

## 2018-04-08 RX ADMIN — DEXTROSE AND SODIUM CHLORIDE 96 ML/HR: 5; 450 INJECTION, SOLUTION INTRAVENOUS at 05:30

## 2018-04-08 RX ADMIN — LABETALOL HYDROCHLORIDE 20 MG: 5 INJECTION, SOLUTION INTRAVENOUS at 04:10

## 2018-04-08 NOTE — H&P
[facility]  Obstetric History and Physical    Chief Complaint   Patient presents with   • Abdominal Pain     headache, right epigastric pain, dizziness, blurry vision       HPI:    Patient is a 22 y.o. female  currently at 29w6d, who presents with a complaint of RUQ pain under her ribs, a HA, and blurry vision with seeing spots.   She says she has had the HA for the last 3 days, but the RUQ pain is more tonight.  She thought it may have been her Gallbladder, but it is not associated with eating and is constant and dull.  Upon presentation her BPs were 180-190/90-100s.    She denies contractions, leaking fluid, or bleeding.  +               Prenatal Information:  Prenatal Results         Initial Prenatal Labs Ref. Range Date Time   Hemoglobin  12.2 g/dL 12.0 - 17.0 g/dL 17 2257   Hematocrit  36 % (L) 38 - 51 % 17 2257   Platelets  188 10*3/mm3 150 - 450 10*3/mm3 18 0402   Rubella IgG  71.6 IU/mL >=5.0 IU/mL 17 1429      Immune  Immune 17 1429   Hepatitis B SAg  Non-Reactive  Non-Reactive 17 1429   Hepatitis C Ab       RPR  Non-Reactive  Non-Reactive 17 1429   ABO  O   18 0402   Rh  Positive   18 0402   Antibody Screen  Negative   17 1429   HIV  Non-Reactive  Non-Reactive 17 1429   Urine Culture  20,000-30,000 CFU/mL Normal Urogenital Jannet   18 2151   Gonorrhea ^ negative   17    Chlamydia ^ negative   17    TSH  3.100 mIU/mL 0.350 - 5.350 mIU/mL 16 1419   2nd and 3rd Trimester Ref. Range Date Time   Hemoglobin (repeated)  13.1 g/dL 11.5 - 15.5 g/dL 18 0402   Hematocrit (repeated)  37.9 % 34.5 - 44.0 % 18 0402   GCT  86 mg/dL 65 - 140 mg/dL 18 0923   Antibody Screen (repeated)  Negative   18 0402   GTT Fasting       GTT 1 Hr       GTT 2 Hr       GTT 3 Hr       Group B Strep       Drug Screening Ref. Range Date Time   Amphetamine Screen  Negative  Negative 17 1430   Barbiturate Screen   Negative  Negative 11/16/17 1430   Benzodiazepine Screen  Negative  Negative 11/16/17 1430   Methadone Screen  Negative  Negative 11/16/17 1430   Phencyclidine Screen  Negative  Negative 11/16/17 1430   Opiates Screen  Negative  Negative 11/16/17 1430   THC Screen  Negative  Negative 11/16/17 1430   Cocaine Screen  Negative  Negative 11/16/17 1430   Propoxyphene Screen  Negative  Negative 11/16/17 1430   Buprenorphine Screen  Negative  Negative 11/16/17 1430   Methamphetamine Screen  Negative  Negative 11/16/17 1430   Oxycodone Screen  Negative  Negative 11/16/17 1430   Tryicyclic Antidepressants Screen  Negative  Negative 11/16/17 1430   Other (Risk screening) Ref. Range Date Time   Varicella IgG       Parvovirus IgG       CMV IgG       Cystic Fibrosis       Hemoglobin electrophoresis       NIPT       MSAFP-4 ^ declined   12/11/17    AFP (for NTD only)                 Legend: ^: Historical                   External Prenatal Results         Pregnancy Outside Results - these were transcribed from office records.  See scanned records for details. Date Time   Hgb  13.1 g/dL 04/08/18 0402   Hct  37.9 % 04/08/18 0402   ABO  O  04/08/18 0402   Rh  Positive  04/08/18 0402   Antibody Screen  Negative  04/08/18 0402   Glucose Fasting GTT      Glucose Tolerance Test 1 hour      Glucose Tolerance Test 3 hour      Gonorrhea (discrete) ^ negative  12/11/17    Chlamydia (discrete) ^ negative  12/11/17    RPR  Non-Reactive  11/16/17 1429   VDRL      Syphillis Antibody      Rubella  71.6 IU/mL 11/16/17 1429      Immune  11/16/17 1429   HBsAg  Non-Reactive  11/16/17 1429   Herpes Simplex Virus PCR      Herpes Simplex VIrus Culture      HIV  Non-Reactive  11/16/17 1429   Hep C RNA Quant PCR      Hep C Antibody      AFP      Group B Strep      GBS Susceptibility to Clindamycin      GBS Susceptibility to Eythromycin      Fetal Fibronectin      Genetic Testing, Maternal Blood      Drug Screening Date Time   Urine Drug Screen     "  Amphetamine Screen  Negative  17 1430   Barbiturate Screen  Negative  17 1430   Benzodiazepine Screen  Negative  17 1430   Methadone Screen  Negative  17 1430   Phencyclidine Screen  Negative  17 1430   Opiates Screen  Negative  17 1430   THC Screen  Negative  17 1430   Cocaine Screen      Propoxyphene Screen  Negative  17 1430   Buprenorphine Screen  Negative  17 1430   Methamphetamine Screen      Oxycodone Screen  Negative  17 1430   Tryicyclic Antidepressants Screen  Negative  17 1430             Legend: ^: Historical                      Past OB History:     Obstetric History       T0      L0     SAB1   TAB0   Ectopic0   Molar0   Multiple0   Live Births0       # Outcome Date GA Lbr Alan/2nd Weight Sex Delivery Anes PTL Lv   2 Current            1 SAB 2017 6w0d                 Past Medical History: Past Medical History:   Diagnosis Date   • Attention deficit hyperactivity disorder (ADHD), combined type 2012    Off meds after 1-2 years   • Hypothyroid     Resolved after ~ 3 years of Rxs   • Migraine     \"When I was younger\"  Last episode was one week ago   • Pelvic adhesive disease 2016   • Scoliosis       Past Surgical History Past Surgical History:   Procedure Laterality Date   • LAPAROSCOPIC APPENDECTOMY     • LAPAROSCOPIC LYSIS OF ADHESIONS  2016    Findings consistent with chronic PID   • NASAL SEPTUM SURGERY     • [place] CATH/INJECT HYSTEROSALPINGOGRAM  2017     Uterus normal; right adnexa normal; left tube did not demonstrate fill or spill   • TONSILLECTOMY AND ADENOIDECTOMY        Family History: Family History   Problem Relation Age of Onset   • Coronary artery disease Paternal Grandmother    • Diabetes Paternal Grandmother    • Hypertension Paternal Grandmother       Social History:  reports that she quit smoking about 11 months ago. Her smoking use included Cigarettes. She started smoking about " 6 years ago. She smoked 0.25 packs per day. She has never used smokeless tobacco.   reports that she does not drink alcohol.   reports that she does not use drugs.        Review of Systems:  Denies chest pain, SOA, N/V/F/C.   She denies leg pain.  Has no                                                  rashes      Objective     Vital Signs Range for the last 24 hours  Temperature: Temp:  [98.9 °F (37.2 °C)-100.1 °F (37.8 °C)] 99 °F (37.2 °C)   Temp Source: Temp src: Oral   BP: BP: (140-183)/() 160/94   Pulse: Heart Rate:  [51-81] 64   Respirations: Resp:  [16-20] 20   SPO2: SpO2:  [98 %-99 %] 98 %   O2 Amount (l/min):     O2 Devices     Weight: Weight:  [89.4 kg (197 lb)] 89.4 kg (197 lb)     Physical Examination: General appearance - oriented to person, place, and time  Chest - no tachypnea, retractions or cyanosis  Heart - normal rate and regular rhythm, S1 and S2 normal  Abdomen - tenderness noted in the RUQ and epigastric with palpation  no rebound tenderness noted  bowel sounds normal  no CVA tenderness  Extremities -  +1 edema.   +2+3 DTR                          Fetal Heart Rate Assessment   Method: Fetal HR Assessment Method: external   Beats/min: Fetal HR (beats/min): 130   Baseline: Fetal Heart Baseline Rate: normal range   Varibility: Fetal HR Variability: moderate (amplitude range 6 to 25 bpm)   Accels: Fetal HR Accelerations: greater than/equal to 10 bpm (32 wks gest or less), lasts at least 10 seconds (32 wks gest or less)   Decels: Fetal HR Decelerations: absent   Tracing Category:       Uterine Assessment   Method: Method: external tocotransducer   Frequency (min): Contraction Frequency (Minutes): irritability    Ctx Count in 10 min:     Duration:     Intensity: Contraction Intensity: no contractions   Intensity by IUPC:     Resting Tone:     Resting Tone by IUPC:     [place] Units:       Laboratory Results:   Lab Results   Component Value Date     04/08/2018    HGB 13.1 04/08/2018     HCT 37.9 04/08/2018    WBC 14.16 (H) 04/08/2018     Lab Results   Component Value Date     04/08/2018    K 4.3 04/08/2018     04/08/2018    CO2 22.0 04/08/2018    BUN 16 04/08/2018    CREATININE 0.90 04/08/2018    CREATININE 0.92 04/08/2018    GLUCOSE 89 04/08/2018    ALBUMIN 3.80 04/08/2018    CALCIUM 9.2 04/08/2018    AST 31 04/08/2018    AST 31 04/08/2018    ALT 27 04/08/2018    ALT 27 04/08/2018    BILITOT 0.4 04/08/2018    BILITOT 0.4 04/08/2018     Lab Results   Component Value Date    AMYLASE 50 04/08/2018    LIPASE 37 04/08/2018         Assessment/Plan     Active Problems:    Prenatal care, subsequent pregnancy    Previous pregnancy (FOB's) with [facility]HS - normal echo at PDC 24 weeks    LVEIF    Pre-eclampsia      Assessment & Plan    Assessment:  1.  Intrauterine pregnancy at 29w6d weeks gestation with reassuring fetal status.    2.  Pre-eclampsia    Plan:  1. Admit  2. CBC, CMP, T&S, Amylase, Lipase  3. IV, Magnesium 4/2  4. BMZ x2  5. Labetalol protocol for BP management  6. PDC consult and scan tomorrow.        Toño Jack MD  4/8/2018  6:18 AM

## 2018-04-08 NOTE — PLAN OF CARE
Problem: Patient Care Overview  Goal: Plan of Care Review   04/08/18 1622   Coping/Psychosocial   Plan of Care Reviewed With patient   Plan of Care Review   Progress no change     Goal: Individualization and Mutuality  Outcome: Ongoing (interventions implemented as appropriate)   04/08/18 1622   Individualization   Patient Specific Preferences rest   Patient Specific Goals (Include Timeframe) stay pregnant through steroid window, healthy mom and baby   Patient Specific Interventions cluster care, NICU interview, pain control, mag checks     Goal: Discharge Needs Assessment  Outcome: Ongoing (interventions implemented as appropriate)   04/08/18 1622   Discharge Needs Assessment   Concerns to be Addressed no discharge needs identified       Problem: Hypertensive Disorders in Pregnancy (Adult,Obstetrics,Pediatric)  Goal: Signs and Symptoms of Listed Potential Problems Will be Absent, Minimized or Managed (Hypertensive Disorders in Pregnancy)  Outcome: Ongoing (interventions implemented as appropriate)   04/08/18 1622   Goal/Outcome Evaluation   Problems Assessed (Hypertensive Disorders in Pregnancy) all   Problems Present (Hypertensive/in PG) none

## 2018-04-09 ENCOUNTER — APPOINTMENT (OUTPATIENT)
Dept: WOMENS IMAGING | Facility: HOSPITAL | Age: 23
End: 2018-04-09

## 2018-04-09 PROBLEM — O36.5930 POOR FETAL GROWTH AFFECTING MANAGEMENT OF MOTHER IN THIRD TRIMESTER: Status: ACTIVE | Noted: 2018-04-09

## 2018-04-09 LAB
ALP SERPL-CCNC: 140 U/L (ref 25–100)
ALT SERPL W P-5'-P-CCNC: 26 U/L (ref 7–40)
AST SERPL-CCNC: 23 U/L (ref 0–33)
BILIRUB SERPL-MCNC: 0.3 MG/DL (ref 0.3–1.2)
CREAT BLD-MCNC: 1.2 MG/DL (ref 0.6–1.3)
DEPRECATED RDW RBC AUTO: 44.7 FL (ref 37–54)
ERYTHROCYTE [DISTWIDTH] IN BLOOD BY AUTOMATED COUNT: 13.3 % (ref 11.3–14.5)
HCT VFR BLD AUTO: 37.1 % (ref 34.5–44)
HGB BLD-MCNC: 12.3 G/DL (ref 11.5–15.5)
LDH SERPL-CCNC: 299 U/L (ref 120–246)
MCH RBC QN AUTO: 31.5 PG (ref 27–31)
MCHC RBC AUTO-ENTMCNC: 33.2 G/DL (ref 32–36)
MCV RBC AUTO: 95.1 FL (ref 80–99)
PLATELET # BLD AUTO: 203 10*3/MM3 (ref 150–450)
PMV BLD AUTO: 12.8 FL (ref 6–12)
RBC # BLD AUTO: 3.9 10*6/MM3 (ref 3.89–5.14)
URATE SERPL-MCNC: 7.1 MG/DL (ref 3.1–7.8)
WBC NRBC COR # BLD: 17.52 10*3/MM3 (ref 3.5–10.8)

## 2018-04-09 PROCEDURE — 76819 FETAL BIOPHYS PROFIL W/O NST: CPT

## 2018-04-09 PROCEDURE — 99253 IP/OBS CNSLTJ NEW/EST LOW 45: CPT | Performed by: OBSTETRICS & GYNECOLOGY

## 2018-04-09 PROCEDURE — 83615 LACTATE (LD) (LDH) ENZYME: CPT | Performed by: OBSTETRICS & GYNECOLOGY

## 2018-04-09 PROCEDURE — 59025 FETAL NON-STRESS TEST: CPT

## 2018-04-09 PROCEDURE — S0260 H&P FOR SURGERY: HCPCS | Performed by: OBSTETRICS & GYNECOLOGY

## 2018-04-09 PROCEDURE — 84450 TRANSFERASE (AST) (SGOT): CPT | Performed by: OBSTETRICS & GYNECOLOGY

## 2018-04-09 PROCEDURE — 82565 ASSAY OF CREATININE: CPT | Performed by: OBSTETRICS & GYNECOLOGY

## 2018-04-09 PROCEDURE — 76816 OB US FOLLOW-UP PER FETUS: CPT

## 2018-04-09 PROCEDURE — 25010000002 BETAMETHASONE ACET & SOD PHOS PER 4 MG: Performed by: OBSTETRICS & GYNECOLOGY

## 2018-04-09 PROCEDURE — 76816 OB US FOLLOW-UP PER FETUS: CPT | Performed by: OBSTETRICS & GYNECOLOGY

## 2018-04-09 PROCEDURE — 76820 UMBILICAL ARTERY ECHO: CPT

## 2018-04-09 PROCEDURE — 76820 UMBILICAL ARTERY ECHO: CPT | Performed by: OBSTETRICS & GYNECOLOGY

## 2018-04-09 PROCEDURE — 85027 COMPLETE CBC AUTOMATED: CPT | Performed by: OBSTETRICS & GYNECOLOGY

## 2018-04-09 PROCEDURE — 25010000002 MAGNESIUM SULFATE-LACT RINGERS 40 GM/580ML SOLUTION: Performed by: OBSTETRICS & GYNECOLOGY

## 2018-04-09 PROCEDURE — 84075 ASSAY ALKALINE PHOSPHATASE: CPT | Performed by: OBSTETRICS & GYNECOLOGY

## 2018-04-09 PROCEDURE — 82247 BILIRUBIN TOTAL: CPT | Performed by: OBSTETRICS & GYNECOLOGY

## 2018-04-09 PROCEDURE — 84550 ASSAY OF BLOOD/URIC ACID: CPT | Performed by: OBSTETRICS & GYNECOLOGY

## 2018-04-09 PROCEDURE — 25010000002 ONDANSETRON PER 1 MG: Performed by: OBSTETRICS & GYNECOLOGY

## 2018-04-09 PROCEDURE — 76819 FETAL BIOPHYS PROFIL W/O NST: CPT | Performed by: OBSTETRICS & GYNECOLOGY

## 2018-04-09 PROCEDURE — 84460 ALANINE AMINO (ALT) (SGPT): CPT | Performed by: OBSTETRICS & GYNECOLOGY

## 2018-04-09 RX ORDER — PRENATAL VIT/IRON FUM/FOLIC AC 27MG-0.8MG
1 TABLET ORAL DAILY
Status: DISCONTINUED | OUTPATIENT
Start: 2018-04-09 | End: 2018-04-09

## 2018-04-09 RX ORDER — PRENATAL VIT/IRON FUM/FOLIC AC 27MG-0.8MG
1 TABLET ORAL NIGHTLY
Status: DISCONTINUED | OUTPATIENT
Start: 2018-04-09 | End: 2018-04-11

## 2018-04-09 RX ADMIN — ONDANSETRON 4 MG: 2 INJECTION INTRAMUSCULAR; INTRAVENOUS at 01:24

## 2018-04-09 RX ADMIN — ACETAMINOPHEN 1000 MG: 500 TABLET, FILM COATED ORAL at 18:05

## 2018-04-09 RX ADMIN — ACETAMINOPHEN 1000 MG: 500 TABLET, FILM COATED ORAL at 04:27

## 2018-04-09 RX ADMIN — DEXTROSE AND SODIUM CHLORIDE 100 ML/HR: 5; 450 INJECTION, SOLUTION INTRAVENOUS at 05:38

## 2018-04-09 RX ADMIN — PRENATAL VIT W/ FE FUMARATE-FA TAB 27-0.8 MG 1 TABLET: 27-0.8 TAB at 20:48

## 2018-04-09 RX ADMIN — BETAMETHASONE SODIUM PHOSPHATE AND BETAMETHASONE ACETATE 12 MG: 3; 3 INJECTION, SUSPENSION INTRA-ARTICULAR; INTRALESIONAL; INTRAMUSCULAR at 05:33

## 2018-04-09 RX ADMIN — Medication 2 G/HR: at 00:39

## 2018-04-09 NOTE — PROGRESS NOTES
Laborist Note    CTSP to evaluate HA and abd pain    Pt recently received Tylenol     She states she abd pain started a few minutes ago. She describes it as constant and sharp. She has tried a position change and it did not help.    She is also having some decreased fetal movement and chest pain. She is unable to describe the chest pain further.    Vitals:    04/09/18 1905 04/09/18 1909 04/09/18 1910 04/09/18 1915   BP:   154/91    BP Location:       Patient Position:       Pulse: 64 64 64 60   Resp:       Temp:       TempSrc:       SpO2: 95% 96% 94% 96%   Weight:       Height:         Gen: uncomfortable-appearing  Abd: soft, ND, gravid; +right lower abd pain, over iliac crest    Baby on monitor now      Will rpt labs now and do NST    Lauren Aponte, DO      Pt got up to BR,  had to assist to bed because she could not feel her legs anymore. Per RN, pt able to move legs and feel touch on exam

## 2018-04-09 NOTE — PLAN OF CARE
Problem: Patient Care Overview  Goal: Individualization and Mutuality   04/08/18 1622 04/08/18 6048   Individualization   Patient Specific Preferences --  control headache pain   Patient Specific Goals (Include Timeframe) stay pregnant through steroid window, healthy mom and baby --    Patient Specific Interventions --  incentive spirometer use with hourly mag checks

## 2018-04-09 NOTE — CONSULTS
Maternal Fetal Medicine Consult    Patient Name:  Tatianna Diane  :  1995  MRN:  [id number]  Date of Service:  2018  Requesting Provider:  Chacorta Romero MD    Treatment Team:   Attending Provider: Chacorta Romero MD  Obstetrician: Scottie Whatley MD  Admitting Provider: Chacorta Romero MD    ID:   22 y.o.  at 30w0d    Consultation due to:  Pre-eclampsia, c/o HA    Pregnancy course significant for:   Patient Active Problem List   Diagnosis   • Annual GYN exam w/o problem   • Prenatal care, subsequent pregnancy   • Previous pregnancy (FOB's) with HLHS - normal echo at PDC 24 weeks   • LVEIF   • Pre-eclampsia       History of Present Illness:   22-year-old white female  2 para 0010 at 30 weeks 0 days gestation with prenatal care by Dr. Romero.  The patient noted a headache beginning last Thursday that had not improved with Tylenol.  Headache worsened over the weekend and she also developed worsening nausea and vomiting which had been present over the past 3 weeks.  She had a new onset of right upper quadrant pain in the late evening of 2018 that persisted 2018.  She presented to labor and delivery in the early morning of 2018 for evaluation believing that her pain was related to her gallbladder.  Initial blood pressures were as high as 188/124 but have improved.  She is currently treated with magnesium sulfate and has received 2 doses of steroids for lung maturity enhancement.  Her platelet count and liver function tests are normal.  The nonstress test has decreased mpkq-dp-yrio variability.  She notes that her right upper quadrant pain has improved.  Our service was requested provide consultation for evaluation of preeclampsia    Prenatal Labs   Lab Results   Component Value Date    HGB 13.1 2018    HEPBSAG Non-Reactive 2017    ABO O 2018    RH Positive 2018    ABSCRN Negative 2018    [id number] Non-Reactive 2017     "URINECX 20,000-30,000 CFU/mL Normal Urogenital Jannet 2018       REVIEW OF SYSTEMS   Negative: Chills, sore throat, joint pain, easy bruisability, rupture of membranes, vaginal bleeding, contractions,  Positive: Complaints of fever on admission, vision change that she describes as flashes of light, swelling in her legs face and hands, shortness of breath that she describes as inability to take a deep breath, nausea and vomiting on admission, rash under her left breast, dizziness, right upper quadrant pain on admission but currently not present, headache beginning last Thursday    OB HISTORY    OB History    Para Term  AB Living   2 0 0 0 1 0   SAB TAB Ectopic Molar Multiple Live Births   1 0 0 0 0 0      # Outcome Date GA Lbr Alan/2nd Weight Sex Delivery Anes PTL Lv   2 Current            1 SAB 2017 6w0d             She did not require a D&C    PAST MEDICAL HISTORY    Past Medical History:   Diagnosis Date   • Attention deficit hyperactivity disorder (ADHD), combined type     Off meds after 1-2 years   • Hypothyroid     Resolved after ~ 3 years of Rxs   • Migraine     \"When I was younger\"  Last episode was one week ago   • Pelvic adhesive disease 2016   • Scoliosis    Anxiety for which she had planned to receive counseling    PAST SURGICAL HISTORY     has a past surgical history that includes Laparoscopic appendectomy (); tonsillectomy and adenoidectomy (); Nasal septum surgery (); laparoscopic lysis of adhesions (2016); and pr cath/inject hysterosalpingogram (2017).    CURRENT MEDICATIONS      Current Facility-Administered Medications:   •  acetaminophen (TYLENOL) tablet 1,000 mg, 1,000 mg, Oral, Q6H PRN, Jorge Gorman MD, 1,000 mg at 18 0423  •  betamethasone acetate-betamethasone sodium phosphate (CELESTONE SOLUSPAN) injection 12 mg, 12 mg, Intramuscular, Q24H, Toño Jack MD, 12 mg at 18 1270  •  betamethasone acetate-betamethasone sodium " phosphate (CELESTONE SOLUSPAN) injection 12 mg, 12 mg, Intramuscular, Q24H, Toño Jack MD, 12 mg at 04/09/18 0533  •  dextrose 5 % and sodium chloride 0.45 % infusion, 100 mL/hr, Intravenous, Continuous, Toño Jack MD, Last Rate: 100 mL/hr at 04/09/18 0538, 100 mL/hr at 04/09/18 0538  •  lactated ringers infusion, 125 mL/hr, Intravenous, Continuous, Toño Jack MD, Last Rate: 125 mL/hr at 04/08/18 0400, 125 mL/hr at 04/08/18 0400  •  lidocaine PF 1% (XYLOCAINE) injection 5 mL, 5 mL, Intradermal, PRN, Toño Jack MD  •  Magnesium Sulfate-Lact Ringers 40 GM/580ML, 2 g/hr, Intravenous, Continuous, Toño Jack MD, Last Rate: 29 mL/hr at 04/09/18 0039, 2 g/hr at 04/09/18 0039  •  ondansetron (ZOFRAN) injection 4 mg, 4 mg, Intravenous, Q6H PRN, Toño Jack MD, 4 mg at 04/09/18 0124  •  promethazine (PHENERGAN) injection 12.5 mg, 12.5 mg, Intravenous, Q6H PRN, Toño Jack MD  •  promethazine (PHENERGAN) injection 12.5 mg, 12.5 mg, Intravenous, Q6H PRN, Toño Jack MD, 12.5 mg at 04/08/18 0521  •  sodium chloride 0.9 % flush 1-10 mL, 1-10 mL, Intravenous, PRN, Toño Jack MD    ALLERGIES    Review of patient's allergies indicates no known allergies.    SOCIAL HISTORY    History   Smoking Status   • Former Smoker   • Packs/day: 0.25   • Types: Cigarettes   • Start date: 2012   • Quit date: 05/2017   Smokeless Tobacco   • Never Used      History   Alcohol Use No     History   Drug Use No   The patient has been  for one month.  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  The father of her fetus had a previous child with hypoplastic left heart syndrome    FAMILY HISTORY  The patient has a family history of diabetes and hypertension for her paternal grandmother.  She denies family members with preeclampsia but notes specifically minimal knowledge of her mother's family history as her mother was adopted    PHYSICAL EXAMINATION    Vital Signs Range for the  last 24 hours  Temperature: Temp:  [97.5 °F (36.4 °C)-98.2 °F (36.8 °C)] 97.5 °F (36.4 °C)   Temp Source: Temp src: Oral   BP: BP: (111-188)/() 135/84   Pulse: Heart Rate:  [52-84] 59   Respirations: Resp:  [12-18] 16   Weight: 89.4 kg (197 lb)     Constitutional:  Well developed, well nourished, no acute distress, with extensive tattoos over both upper extremities and back.  Eyes: PERRLA, EOMI  ENMT:  Supple, No TM   Respiratory:  Lungs are clear to auscultation bilaterally, normal breath sounds.   Cardiovascular:  Normal rate and rhythm, no murmurs.   Gastrointestinal:  Soft, gravid, nontender.  Uterus: Soft, nontender. Fundal height lags for gestational age.  The fetus palpates in the vertex presentation.  Neurologic:  Alert & oriented x 3,  no focal deficits noted. DTR's 2+.  Psychiatric:  Speech and behavior appropriate.   Extremities: no cyanosis or clubbing. No evidence of DVT, Edema trace, extensive tattoos on both upper extremities and back and leg.    Non-Stress Test: Personally reviewed by me.  The fetal heart rate baseline is decreased with a value of 105 bpm.  The nonstress test is nonreactive    Fetal Heart Rate Assessment   Method: Fetal HR Assessment Method: external   Beats/min: Fetal HR (beats/min): 105   Baseline: Fetal Heart Baseline Rate: normal range   Varibility: Fetal HR Variability: minimal (detectable, amplitude less than or equal to 5 bpm)   Accels: Fetal HR Accelerations: absent   Decels: Fetal HR Decelerations: absent   Tracing Category:       Uterine Assessment   Method: Method: palpation   Frequency (min): Contraction Frequency (Minutes):  (sitting up eating)   Ctx Count in 10 min:     Duration:     Intensity: Contraction Intensity: no contractions   Intensity by IUPC: @[id number](:LAST)@   Resting Tone: Uterine Resting Tone: soft by palpation   Resting Tone by IUPC:     Janiya Units:       Cervix: Exam by:     Dilation:     Effacement:     Station:       Ultrasound:  Single  viable intrauterine pregnancy in the vertex presentation.  Significant fetal growth restriction is present with the overall biometry lagging by 3 weeks, fetal abdominal circumference measurement lagging by 4-1/2 weeks.  Estimated fetal weight is 953 g which is less than the 10th percentile for GA.  Biophysical profile is 8 of 8.  Amniotic fluid volume normal with an DEYSI of 11.0 cm.  There is evidence of placental changes consistent with placental infarction    Labs:  Labs:  WBC   Date Value Ref Range Status   04/08/2018 14.16 (H) 3.50 - 10.80 10*3/mm3 Final     RBC   Date Value Ref Range Status   04/08/2018 4.13 3.89 - 5.14 10*6/mm3 Final     Hemoglobin   Date Value Ref Range Status   04/08/2018 13.1 11.5 - 15.5 g/dL Final     Hematocrit   Date Value Ref Range Status   04/08/2018 37.9 34.5 - 44.0 % Final     MCV   Date Value Ref Range Status   04/08/2018 91.8 80.0 - 99.0 fL Final     MCH   Date Value Ref Range Status   04/08/2018 31.7 (H) 27.0 - 31.0 pg Final     MCHC   Date Value Ref Range Status   04/08/2018 34.6 32.0 - 36.0 g/dL Final     RDW   Date Value Ref Range Status   04/08/2018 13.1 11.3 - 14.5 % Final     RDW-SD   Date Value Ref Range Status   04/08/2018 42.8 37.0 - 54.0 fl Final     MPV   Date Value Ref Range Status   04/08/2018 13.1 (H) 6.0 - 12.0 fL Final     Platelets   Date Value Ref Range Status   04/08/2018 188 150 - 450 10*3/mm3 Final     Neutrophil %   Date Value Ref Range Status   04/08/2018 51.7 41.0 - 71.0 % Final     Lymphocyte %   Date Value Ref Range Status   04/08/2018 37.5 24.0 - 44.0 % Final     Monocyte %   Date Value Ref Range Status   04/08/2018 8.8 0.0 - 12.0 % Final     Eosinophil %   Date Value Ref Range Status   04/08/2018 1.1 0.0 - 3.0 % Final     Basophil %   Date Value Ref Range Status   04/08/2018 0.4 0.0 - 1.0 % Final     Immature Grans %   Date Value Ref Range Status   04/08/2018 0.5 0.0 - 0.6 % Final     Neutrophils, Absolute   Date Value Ref Range Status   04/08/2018 7.33  1.50 - 8.30 10*3/mm3 Final     Lymphocytes, Absolute   Date Value Ref Range Status   2018 5.31 (H) 0.60 - 4.80 10*3/mm3 Final     Monocytes, Absolute   Date Value Ref Range Status   2018 1.24 (H) 0.00 - 1.00 10*3/mm3 Final     Eosinophils, Absolute   Date Value Ref Range Status   2018 0.16 0.00 - 0.30 10*3/mm3 Final     Basophils, Absolute   Date Value Ref Range Status   2018 0.05 0.00 - 0.20 10*3/mm3 Final     Immature Grans, Absolute   Date Value Ref Range Status   2018 0.07 (H) 0.00 - 0.03 10*3/mm3 Final         Results from last 7 days  Lab Units 18  0402   ABO TYPING  O   RH TYPING  Positive   ANTIBODY SCREEN  Negative       Preeclampsia Panel Lab Result  Lab Results   Component Value Date    ALKPHOS 153 (H) 2018    ALKPHOS 155 (H) 2018    ALT 27 2018    ALT 27 2018    AST 31 2018    AST 31 2018    CREATININE 0.90 2018    CREATININE 0.92 2018    BILITOT 0.4 2018    BILITOT 0.4 2018     (H) 2018    URICACID 7.1 2018       ASSESSMENT/PLAN:  22 y.o. female  at 30w0d with Pre-eclampsia (BP and HA)  1. Intrauterine growth restriction with estimated fetal weight less than the 10th percentile for gestational age  2.  Anxiety  3.  Father of the fetus with a previous child with hypoplastic left heart syndrome  4.  Has received 2 doses of steroids for lung maturity enhancement      Plan  1.  Discontinue magnesium sulfate  2.  Would begin antihypertensive therapy with nifedipine should the patient have persistent blood pressures greater than 160/110.  Would choose not to utilize oral labetalol given the severe growth restriction but would utilize IV labetalol when necessary severe hypertension  3.  Repeat laboratory evaluation a.m. 4/10/2018 with hepatitis C antibody   4.  Complete 24-hour urine studies  5.  Repeat biophysical profile on 2018        Approximately 35 minutes floor time was  spent in care of this patient, of which greater than 50% was spent in face-to-face consultation and coordination of care.    David Young MD     4/9/2018   8:14 AM

## 2018-04-09 NOTE — PROGRESS NOTES
[facility] Anatoly Diane  : 1995  MRN: [id number]  CSN: [id number]    Antepartum Progress Note    Subjective   No new complaints.  Has no questions.  Understands the topics discussed by      Objective     Min/max vitals past 24 hours:   Temp  Min: 97.5 °F (36.4 °C)  Max: 98.2 °F (36.8 °C)  BP  Min: 111/71  Max: 188/124  Pulse  Min: 52  Max: 84  Resp  Min: 12  Max: 18                 Assessment   1. IUP at 30w0d  2. PIH with severe features  3. IUGR     Plan   1. Off MgSO4  2. Continue inpatient care  3. Recheck labs in am  4. Appreciate PDC input    Chacorta Romero MD  2018  9:03 AM

## 2018-04-09 NOTE — NURSING NOTE
2230.  Called Dr Jack to review change in FHR baseline.  No new orders regarding FHR tracing.   2232. Pulse ox on to confirm that tracing is not maternal.  Maternal heart rate 67.  SpO2 92.  Lungs are clear.  Pt has intermittent cough.  Respiratory rate 16.  Placed nasal cannula at 2 liters oxygen on pt.  Marcie called with update regarding SpO2.  Phone order to titrate oxygen to keep pt above 95%.

## 2018-04-10 ENCOUNTER — APPOINTMENT (OUTPATIENT)
Dept: CARDIOLOGY | Facility: HOSPITAL | Age: 23
End: 2018-04-10
Attending: OBSTETRICS & GYNECOLOGY

## 2018-04-10 ENCOUNTER — ANESTHESIA EVENT (OUTPATIENT)
Dept: LABOR AND DELIVERY | Facility: HOSPITAL | Age: 23
End: 2018-04-10

## 2018-04-10 ENCOUNTER — ANESTHESIA (OUTPATIENT)
Dept: LABOR AND DELIVERY | Facility: HOSPITAL | Age: 23
End: 2018-04-10

## 2018-04-10 PROBLEM — N28.9: Status: ACTIVE | Noted: 2018-04-10

## 2018-04-10 PROBLEM — O26.833: Status: ACTIVE | Noted: 2018-04-10

## 2018-04-10 LAB
ALP SERPL-CCNC: 143 U/L (ref 25–100)
ALT SERPL W P-5'-P-CCNC: 23 U/L (ref 7–40)
AST SERPL-CCNC: 23 U/L (ref 0–33)
ATMOSPHERIC PRESS: ABNORMAL MMHG
ATMOSPHERIC PRESS: ABNORMAL MMHG
BASE EXCESS BLDCOA CALC-SCNC: -4.5 MMOL/L (ref 0–2)
BASE EXCESS BLDCOV CALC-SCNC: -2.5 MMOL/L (ref 0–2)
BASOPHILS # BLD AUTO: 0.02 10*3/MM3 (ref 0–0.2)
BASOPHILS NFR BLD AUTO: 0.1 % (ref 0–1)
BDY SITE: ABNORMAL
BH CV ECHO MEAS - AO ROOT AREA (BSA CORRECTED): 1.4
BH CV ECHO MEAS - AO ROOT AREA: 5.8 CM^2
BH CV ECHO MEAS - AO ROOT DIAM: 2.7 CM
BH CV ECHO MEAS - BSA(HAYCOCK): 2 M^2
BH CV ECHO MEAS - BSA: 1.9 M^2
BH CV ECHO MEAS - BZI_BMI: 36 KILOGRAMS/M^2
BH CV ECHO MEAS - BZI_METRIC_HEIGHT: 157.5 CM
BH CV ECHO MEAS - BZI_METRIC_WEIGHT: 89.4 KG
BH CV ECHO MEAS - CONTRAST EF (2CH): 64.4 ML/M^2
BH CV ECHO MEAS - CONTRAST EF 4CH: 64.3 ML/M^2
BH CV ECHO MEAS - EDV(CUBED): 117.9 ML
BH CV ECHO MEAS - EDV(MOD-SP2): 87 ML
BH CV ECHO MEAS - EDV(MOD-SP4): 98 ML
BH CV ECHO MEAS - EDV(TEICH): 113 ML
BH CV ECHO MEAS - EF(CUBED): 74.3 %
BH CV ECHO MEAS - EF(MOD-SP2): 64.4 %
BH CV ECHO MEAS - EF(MOD-SP4): 64.3 %
BH CV ECHO MEAS - EF(TEICH): 66 %
BH CV ECHO MEAS - ESV(CUBED): 30.3 ML
BH CV ECHO MEAS - ESV(MOD-SP2): 31 ML
BH CV ECHO MEAS - ESV(MOD-SP4): 35 ML
BH CV ECHO MEAS - ESV(TEICH): 38.4 ML
BH CV ECHO MEAS - FS: 36.4 %
BH CV ECHO MEAS - IVS/LVPW: 1.3
BH CV ECHO MEAS - IVSD: 0.96 CM
BH CV ECHO MEAS - LA DIMENSION: 4 CM
BH CV ECHO MEAS - LA/AO: 1.5
BH CV ECHO MEAS - LAT PEAK E' VEL: 11.3 CM/SEC
BH CV ECHO MEAS - LV DIASTOLIC VOL/BSA (35-75): 51.6 ML/M^2
BH CV ECHO MEAS - LV MASS(C)D: 139.4 GRAMS
BH CV ECHO MEAS - LV MASS(C)DI: 73.4 GRAMS/M^2
BH CV ECHO MEAS - LV SYSTOLIC VOL/BSA (12-30): 18.4 ML/M^2
BH CV ECHO MEAS - LVIDD: 4.9 CM
BH CV ECHO MEAS - LVIDS: 3.1 CM
BH CV ECHO MEAS - LVLD AP2: 7.6 CM
BH CV ECHO MEAS - LVLD AP4: 7.9 CM
BH CV ECHO MEAS - LVLS AP2: 6.5 CM
BH CV ECHO MEAS - LVLS AP4: 6.9 CM
BH CV ECHO MEAS - LVPWD: 0.72 CM
BH CV ECHO MEAS - MED PEAK E' VEL: 7.79 CM/SEC
BH CV ECHO MEAS - MV A MAX VEL: 46.3 CM/SEC
BH CV ECHO MEAS - MV E MAX VEL: 101.2 CM/SEC
BH CV ECHO MEAS - MV E/A: 2.2
BH CV ECHO MEAS - PA ACC SLOPE: 975.4 CM/SEC^2
BH CV ECHO MEAS - PA ACC TIME: 0.08 SEC
BH CV ECHO MEAS - PA PR(ACCEL): 41 MMHG
BH CV ECHO MEAS - PI END-D VEL: 152.4 CM/SEC
BH CV ECHO MEAS - RAP SYSTOLE: 5 MMHG
BH CV ECHO MEAS - RVDD: 2.5 CM
BH CV ECHO MEAS - RVSP: 36 MMHG
BH CV ECHO MEAS - SI(CUBED): 46.1 ML/M^2
BH CV ECHO MEAS - SI(MOD-SP2): 29.5 ML/M^2
BH CV ECHO MEAS - SI(MOD-SP4): 33.2 ML/M^2
BH CV ECHO MEAS - SI(TEICH): 39.2 ML/M^2
BH CV ECHO MEAS - SV(CUBED): 87.6 ML
BH CV ECHO MEAS - SV(MOD-SP2): 56 ML
BH CV ECHO MEAS - SV(MOD-SP4): 63 ML
BH CV ECHO MEAS - SV(TEICH): 74.5 ML
BH CV ECHO MEAS - TAPSE (>1.6): 2.3 CM2
BH CV ECHO MEAS - TR MAX VEL: 299.1 CM/SEC
BH CV XLRA - RV BASE: 4 CM
BH CV XLRA - RV LENGTH: 8.2 CM
BH CV XLRA - RV MID: 3.3 CM
BH CV XLRA - TDI S': 16.8 CM/SEC
BILIRUB SERPL-MCNC: 0.3 MG/DL (ref 0.3–1.2)
CO2 BLDA-SCNC: 23.7 MMOL/L (ref 22–33)
CO2 BLDA-SCNC: 25.9 MMOL/L (ref 22–33)
COLLECT DURATION TIME UR: 24 HRS
CREAT BLD-MCNC: 1 MG/DL (ref 0.6–1.3)
DEPRECATED RDW RBC AUTO: 45.6 FL (ref 37–54)
E/E' RATIO: 11
EOSINOPHIL # BLD AUTO: 0 10*3/MM3 (ref 0–0.3)
EOSINOPHIL NFR BLD AUTO: 0 % (ref 0–3)
ERYTHROCYTE [DISTWIDTH] IN BLOOD BY AUTOMATED COUNT: 13.4 % (ref 11.3–14.5)
HCO3 BLDCOA-SCNC: 22.3 MMOL/L (ref 16.9–20.5)
HCO3 BLDCOV-SCNC: 24.4 MMOL/L (ref 18.6–21.4)
HCT VFR BLD AUTO: 37.2 % (ref 34.5–44)
HCV AB SER DONR QL: NORMAL
HGB BLD-MCNC: 12.5 G/DL (ref 11.5–15.5)
HGB BLDA-MCNC: 14.6 G/DL (ref 14–18)
HGB BLDA-MCNC: 15.2 G/DL (ref 14–18)
HOROWITZ INDEX BLD+IHG-RTO: 21 %
HOROWITZ INDEX BLD+IHG-RTO: 21 %
IMM GRANULOCYTES # BLD: 0.29 10*3/MM3 (ref 0–0.03)
IMM GRANULOCYTES NFR BLD: 1.5 % (ref 0–0.6)
LDH SERPL-CCNC: 334 U/L (ref 120–246)
LEFT ATRIUM VOLUME INDEX: 38.9 ML/M2
LEFT ATRIUM VOLUME: 74 CM3
LYMPHOCYTES # BLD AUTO: 2.92 10*3/MM3 (ref 0.6–4.8)
LYMPHOCYTES NFR BLD AUTO: 15 % (ref 24–44)
MCH RBC QN AUTO: 31.7 PG (ref 27–31)
MCHC RBC AUTO-ENTMCNC: 33.6 G/DL (ref 32–36)
MCV RBC AUTO: 94.4 FL (ref 80–99)
MODALITY: ABNORMAL
MODALITY: ABNORMAL
MONOCYTES # BLD AUTO: 1.44 10*3/MM3 (ref 0–1)
MONOCYTES NFR BLD AUTO: 7.4 % (ref 0–12)
NEUTROPHILS # BLD AUTO: 14.8 10*3/MM3 (ref 1.5–8.3)
NEUTROPHILS NFR BLD AUTO: 76 % (ref 41–71)
PCO2 BLDCOA: 46.1 MMHG (ref 43.3–54.9)
PCO2 BLDCOV: 49.2 MM HG (ref 30–60)
PH BLDCOA: 7.29 PH UNITS (ref 7.2–7.3)
PH BLDCOV: 7.3 PH UNITS (ref 7.19–7.46)
PLATELET # BLD AUTO: 190 10*3/MM3 (ref 150–450)
PMV BLD AUTO: 12.6 FL (ref 6–12)
PO2 BLDCOA: 17.6 MMHG (ref 11.5–43.3)
PO2 BLDCOV: 18.4 MM HG
PROT 24H UR-MRATE: 3102 MG/24HOURS (ref 42–225)
PROT UR-MCNC: 141 MG/DL (ref 1–14)
RBC # BLD AUTO: 3.94 10*6/MM3 (ref 3.89–5.14)
SAO2 % BLDCOA: 32.7 %
SAO2 % BLDCOA: ABNORMAL % (ref 45–75)
SAO2 % BLDCOV: 30.9 %
SPECIMEN VOL 24H UR: 2200 ML
URATE SERPL-MCNC: 6.3 MG/DL (ref 3.1–7.8)
WBC NRBC COR # BLD: 19.47 10*3/MM3 (ref 3.5–10.8)

## 2018-04-10 PROCEDURE — C1765 ADHESION BARRIER: HCPCS | Performed by: OBSTETRICS & GYNECOLOGY

## 2018-04-10 PROCEDURE — 25010000002 MIDAZOLAM PER 1 MG: Performed by: ANESTHESIOLOGY

## 2018-04-10 PROCEDURE — 84450 TRANSFERASE (AST) (SGOT): CPT | Performed by: OBSTETRICS & GYNECOLOGY

## 2018-04-10 PROCEDURE — 84550 ASSAY OF BLOOD/URIC ACID: CPT | Performed by: OBSTETRICS & GYNECOLOGY

## 2018-04-10 PROCEDURE — 25010000002 ONDANSETRON PER 1 MG: Performed by: OBSTETRICS & GYNECOLOGY

## 2018-04-10 PROCEDURE — 88307 TISSUE EXAM BY PATHOLOGIST: CPT | Performed by: OBSTETRICS & GYNECOLOGY

## 2018-04-10 PROCEDURE — 83615 LACTATE (LD) (LDH) ENZYME: CPT | Performed by: OBSTETRICS & GYNECOLOGY

## 2018-04-10 PROCEDURE — 25010000002 MAGNESIUM SULFATE-LACT RINGERS 40 GM/580ML SOLUTION: Performed by: OBSTETRICS & GYNECOLOGY

## 2018-04-10 PROCEDURE — 93306 TTE W/DOPPLER COMPLETE: CPT

## 2018-04-10 PROCEDURE — 59514 CESAREAN DELIVERY ONLY: CPT | Performed by: OBSTETRICS & GYNECOLOGY

## 2018-04-10 PROCEDURE — 84075 ASSAY ALKALINE PHOSPHATASE: CPT | Performed by: OBSTETRICS & GYNECOLOGY

## 2018-04-10 PROCEDURE — 93005 ELECTROCARDIOGRAM TRACING: CPT | Performed by: OBSTETRICS & GYNECOLOGY

## 2018-04-10 PROCEDURE — 25010000002 FENTANYL CITRATE (PF) 100 MCG/2ML SOLUTION: Performed by: ANESTHESIOLOGY

## 2018-04-10 PROCEDURE — 82247 BILIRUBIN TOTAL: CPT | Performed by: OBSTETRICS & GYNECOLOGY

## 2018-04-10 PROCEDURE — 84156 ASSAY OF PROTEIN URINE: CPT | Performed by: OBSTETRICS & GYNECOLOGY

## 2018-04-10 PROCEDURE — 93010 ELECTROCARDIOGRAM REPORT: CPT | Performed by: INTERNAL MEDICINE

## 2018-04-10 PROCEDURE — 25010000003 MORPHINE PER 10 MG: Performed by: ANESTHESIOLOGY

## 2018-04-10 PROCEDURE — 82805 BLOOD GASES W/O2 SATURATION: CPT | Performed by: OBSTETRICS & GYNECOLOGY

## 2018-04-10 PROCEDURE — 81050 URINALYSIS VOLUME MEASURE: CPT | Performed by: OBSTETRICS & GYNECOLOGY

## 2018-04-10 PROCEDURE — 25010000002 MORPHINE SULFATE (PF) 2 MG/ML SOLUTION

## 2018-04-10 PROCEDURE — 59025 FETAL NON-STRESS TEST: CPT

## 2018-04-10 PROCEDURE — 82565 ASSAY OF CREATININE: CPT | Performed by: OBSTETRICS & GYNECOLOGY

## 2018-04-10 PROCEDURE — 93306 TTE W/DOPPLER COMPLETE: CPT | Performed by: INTERNAL MEDICINE

## 2018-04-10 PROCEDURE — 86803 HEPATITIS C AB TEST: CPT | Performed by: OBSTETRICS & GYNECOLOGY

## 2018-04-10 PROCEDURE — 25010000003 CEFAZOLIN IN DEXTROSE 2-4 GM/100ML-% SOLUTION: Performed by: OBSTETRICS & GYNECOLOGY

## 2018-04-10 PROCEDURE — 99231 SBSQ HOSP IP/OBS SF/LOW 25: CPT | Performed by: OBSTETRICS & GYNECOLOGY

## 2018-04-10 PROCEDURE — 84460 ALANINE AMINO (ALT) (SGPT): CPT | Performed by: OBSTETRICS & GYNECOLOGY

## 2018-04-10 PROCEDURE — 85025 COMPLETE CBC W/AUTO DIFF WBC: CPT | Performed by: OBSTETRICS & GYNECOLOGY

## 2018-04-10 RX ORDER — OXYTOCIN/RINGER'S LACTATE 20/1000 ML
125 PLASTIC BAG, INJECTION (ML) INTRAVENOUS CONTINUOUS PRN
Status: CANCELLED | OUTPATIENT
Start: 2018-04-10

## 2018-04-10 RX ORDER — MAGNESIUM SULFATE 1 G/100ML
1 INJECTION INTRAVENOUS CONTINUOUS
Status: DISCONTINUED | OUTPATIENT
Start: 2018-04-10 | End: 2018-04-10

## 2018-04-10 RX ORDER — TRISODIUM CITRATE DIHYDRATE AND CITRIC ACID MONOHYDRATE 500; 334 MG/5ML; MG/5ML
30 SOLUTION ORAL ONCE
Status: CANCELLED | OUTPATIENT
Start: 2018-04-10 | End: 2018-04-10

## 2018-04-10 RX ORDER — LIDOCAINE HYDROCHLORIDE 10 MG/ML
5 INJECTION, SOLUTION EPIDURAL; INFILTRATION; INTRACAUDAL; PERINEURAL AS NEEDED
Status: CANCELLED | OUTPATIENT
Start: 2018-04-10

## 2018-04-10 RX ORDER — MORPHINE SULFATE 0.5 MG/ML
INJECTION, SOLUTION EPIDURAL; INTRATHECAL; INTRAVENOUS AS NEEDED
Status: DISCONTINUED | OUTPATIENT
Start: 2018-04-10 | End: 2018-04-10 | Stop reason: SURG

## 2018-04-10 RX ORDER — MORPHINE SULFATE 2 MG/ML
2 INJECTION, SOLUTION INTRAMUSCULAR; INTRAVENOUS
Status: DISCONTINUED | OUTPATIENT
Start: 2018-04-10 | End: 2018-04-10

## 2018-04-10 RX ORDER — CARBOPROST TROMETHAMINE 250 UG/ML
250 INJECTION, SOLUTION INTRAMUSCULAR AS NEEDED
Status: CANCELLED | OUTPATIENT
Start: 2018-04-10

## 2018-04-10 RX ORDER — MAGNESIUM SULFATE HEPTAHYDRATE 40 MG/ML
4 INJECTION, SOLUTION INTRAVENOUS ONCE
Status: DISCONTINUED | OUTPATIENT
Start: 2018-04-10 | End: 2018-04-10

## 2018-04-10 RX ORDER — FENTANYL CITRATE 50 UG/ML
INJECTION, SOLUTION INTRAMUSCULAR; INTRAVENOUS AS NEEDED
Status: DISCONTINUED | OUTPATIENT
Start: 2018-04-10 | End: 2018-04-10 | Stop reason: SURG

## 2018-04-10 RX ORDER — METHYLERGONOVINE MALEATE 0.2 MG/ML
200 INJECTION INTRAVENOUS ONCE AS NEEDED
Status: CANCELLED | OUTPATIENT
Start: 2018-04-10

## 2018-04-10 RX ORDER — HYDRALAZINE HYDROCHLORIDE 50 MG/1
50 TABLET, FILM COATED ORAL EVERY 8 HOURS SCHEDULED
Status: DISCONTINUED | OUTPATIENT
Start: 2018-04-10 | End: 2018-04-11

## 2018-04-10 RX ORDER — SODIUM CHLORIDE 0.9 % (FLUSH) 0.9 %
1-10 SYRINGE (ML) INJECTION AS NEEDED
Status: CANCELLED | OUTPATIENT
Start: 2018-04-10

## 2018-04-10 RX ORDER — MAGNESIUM SULF/RINGERS LACTATE 40 G/500ML
PLASTIC BAG, INJECTION (ML) INTRAVENOUS
Status: DISPENSED
Start: 2018-04-10 | End: 2018-04-11

## 2018-04-10 RX ORDER — NIFEDIPINE 10 MG/1
10 CAPSULE ORAL ONCE
Status: COMPLETED | OUTPATIENT
Start: 2018-04-10 | End: 2018-04-10

## 2018-04-10 RX ORDER — MAGNESIUM SULF/RINGERS LACTATE 40 G/500ML
2 PLASTIC BAG, INJECTION (ML) INTRAVENOUS CONTINUOUS
Status: DISCONTINUED | OUTPATIENT
Start: 2018-04-10 | End: 2018-04-11

## 2018-04-10 RX ORDER — OXYTOCIN/RINGER'S LACTATE 20/1000 ML
999 PLASTIC BAG, INJECTION (ML) INTRAVENOUS ONCE
Status: CANCELLED | OUTPATIENT
Start: 2018-04-10 | End: 2018-04-10

## 2018-04-10 RX ORDER — MORPHINE SULFATE 2 MG/ML
2 INJECTION, SOLUTION INTRAMUSCULAR; INTRAVENOUS
Status: DISCONTINUED | OUTPATIENT
Start: 2018-04-10 | End: 2018-04-11

## 2018-04-10 RX ORDER — MIDAZOLAM HYDROCHLORIDE 1 MG/ML
INJECTION INTRAMUSCULAR; INTRAVENOUS AS NEEDED
Status: DISCONTINUED | OUTPATIENT
Start: 2018-04-10 | End: 2018-04-10 | Stop reason: SURG

## 2018-04-10 RX ORDER — BUPIVACAINE HYDROCHLORIDE 7.5 MG/ML
INJECTION, SOLUTION INTRASPINAL AS NEEDED
Status: DISCONTINUED | OUTPATIENT
Start: 2018-04-10 | End: 2018-04-10 | Stop reason: SURG

## 2018-04-10 RX ORDER — FAMOTIDINE 20 MG/1
20 TABLET, FILM COATED ORAL 2 TIMES DAILY
Status: DISCONTINUED | OUTPATIENT
Start: 2018-04-10 | End: 2018-04-11

## 2018-04-10 RX ORDER — LABETALOL HYDROCHLORIDE 5 MG/ML
20-80 INJECTION, SOLUTION INTRAVENOUS
Status: DISCONTINUED | OUTPATIENT
Start: 2018-04-10 | End: 2018-04-10

## 2018-04-10 RX ORDER — OXYTOCIN 10 [USP'U]/ML
INJECTION, SOLUTION INTRAMUSCULAR; INTRAVENOUS AS NEEDED
Status: DISCONTINUED | OUTPATIENT
Start: 2018-04-10 | End: 2018-04-10 | Stop reason: SURG

## 2018-04-10 RX ORDER — SODIUM CHLORIDE, SODIUM LACTATE, POTASSIUM CHLORIDE, CALCIUM CHLORIDE 600; 310; 30; 20 MG/100ML; MG/100ML; MG/100ML; MG/100ML
100 INJECTION, SOLUTION INTRAVENOUS CONTINUOUS
Status: DISCONTINUED | OUTPATIENT
Start: 2018-04-10 | End: 2018-04-11

## 2018-04-10 RX ORDER — NIFEDIPINE 30 MG/1
30 TABLET, EXTENDED RELEASE ORAL
Status: DISCONTINUED | OUTPATIENT
Start: 2018-04-10 | End: 2018-04-11

## 2018-04-10 RX ORDER — SODIUM CHLORIDE, SODIUM LACTATE, POTASSIUM CHLORIDE, CALCIUM CHLORIDE 600; 310; 30; 20 MG/100ML; MG/100ML; MG/100ML; MG/100ML
125 INJECTION, SOLUTION INTRAVENOUS CONTINUOUS
Status: CANCELLED | OUTPATIENT
Start: 2018-04-10

## 2018-04-10 RX ORDER — MISOPROSTOL 200 UG/1
800 TABLET ORAL AS NEEDED
Status: CANCELLED | OUTPATIENT
Start: 2018-04-10

## 2018-04-10 RX ORDER — TRISODIUM CITRATE DIHYDRATE AND CITRIC ACID MONOHYDRATE 500; 334 MG/5ML; MG/5ML
30 SOLUTION ORAL ONCE
Status: COMPLETED | OUTPATIENT
Start: 2018-04-10 | End: 2018-04-10

## 2018-04-10 RX ORDER — DOCUSATE SODIUM 100 MG/1
100 CAPSULE, LIQUID FILLED ORAL 2 TIMES DAILY
Status: DISCONTINUED | OUTPATIENT
Start: 2018-04-10 | End: 2018-04-11

## 2018-04-10 RX ORDER — LABETALOL HYDROCHLORIDE 5 MG/ML
20-80 INJECTION, SOLUTION INTRAVENOUS
Status: DISCONTINUED | OUTPATIENT
Start: 2018-04-10 | End: 2018-04-11

## 2018-04-10 RX ORDER — CEFAZOLIN SODIUM 2 G/100ML
2 INJECTION, SOLUTION INTRAVENOUS ONCE
Status: COMPLETED | OUTPATIENT
Start: 2018-04-10 | End: 2018-04-10

## 2018-04-10 RX ORDER — CEFAZOLIN SODIUM 2 G/100ML
2 INJECTION, SOLUTION INTRAVENOUS ONCE
Status: CANCELLED | OUTPATIENT
Start: 2018-04-10 | End: 2018-04-10

## 2018-04-10 RX ADMIN — OXYTOCIN 20 UNITS: 10 INJECTION, SOLUTION INTRAMUSCULAR; INTRAVENOUS at 22:35

## 2018-04-10 RX ADMIN — LABETALOL HYDROCHLORIDE 20 MG: 5 INJECTION INTRAVENOUS at 09:20

## 2018-04-10 RX ADMIN — NIFEDIPINE 10 MG: 10 CAPSULE, LIQUID FILLED ORAL at 01:06

## 2018-04-10 RX ADMIN — HYDRALAZINE HYDROCHLORIDE 50 MG: 50 TABLET, FILM COATED ORAL at 18:45

## 2018-04-10 RX ADMIN — CEFAZOLIN SODIUM 2 G: 2 INJECTION, SOLUTION INTRAVENOUS at 21:16

## 2018-04-10 RX ADMIN — SODIUM CHLORIDE, POTASSIUM CHLORIDE, SODIUM LACTATE AND CALCIUM CHLORIDE 1000 ML/HR: 600; 310; 30; 20 INJECTION, SOLUTION INTRAVENOUS at 20:55

## 2018-04-10 RX ADMIN — FENTANYL CITRATE 85 MCG: 50 INJECTION, SOLUTION INTRAMUSCULAR; INTRAVENOUS at 22:15

## 2018-04-10 RX ADMIN — OXYTOCIN 10 UNITS: 10 INJECTION, SOLUTION INTRAMUSCULAR; INTRAVENOUS at 22:05

## 2018-04-10 RX ADMIN — OXYTOCIN 20 UNITS: 10 INJECTION, SOLUTION INTRAMUSCULAR; INTRAVENOUS at 22:02

## 2018-04-10 RX ADMIN — MIDAZOLAM HYDROCHLORIDE 1 MG: 1 INJECTION, SOLUTION INTRAMUSCULAR; INTRAVENOUS at 21:28

## 2018-04-10 RX ADMIN — MORPHINE SULFATE 4.85 MG: 0.5 INJECTION, SOLUTION EPIDURAL; INTRATHECAL; INTRAVENOUS at 22:25

## 2018-04-10 RX ADMIN — FENTANYL CITRATE 15 MCG: 50 INJECTION, SOLUTION INTRAMUSCULAR; INTRAVENOUS at 21:30

## 2018-04-10 RX ADMIN — ACETAMINOPHEN 1000 MG: 500 TABLET, FILM COATED ORAL at 16:56

## 2018-04-10 RX ADMIN — FAMOTIDINE 20 MG: 20 TABLET, FILM COATED ORAL at 09:18

## 2018-04-10 RX ADMIN — MORPHINE SULFATE 0.15 MG: 0.5 INJECTION, SOLUTION EPIDURAL; INTRATHECAL; INTRAVENOUS at 21:30

## 2018-04-10 RX ADMIN — SODIUM CHLORIDE, POTASSIUM CHLORIDE, SODIUM LACTATE AND CALCIUM CHLORIDE 100 ML/HR: 600; 310; 30; 20 INJECTION, SOLUTION INTRAVENOUS at 23:34

## 2018-04-10 RX ADMIN — Medication 1 G/HR: at 20:49

## 2018-04-10 RX ADMIN — LABETALOL HYDROCHLORIDE 20 MG: 5 INJECTION INTRAVENOUS at 17:40

## 2018-04-10 RX ADMIN — MIDAZOLAM HYDROCHLORIDE 1 MG: 1 INJECTION, SOLUTION INTRAMUSCULAR; INTRAVENOUS at 22:15

## 2018-04-10 RX ADMIN — LABETALOL HYDROCHLORIDE 20 MG: 5 INJECTION INTRAVENOUS at 18:08

## 2018-04-10 RX ADMIN — SODIUM CITRATE AND CITRIC ACID MONOHYDRATE 30 ML: 500; 334 SOLUTION ORAL at 21:17

## 2018-04-10 RX ADMIN — ONDANSETRON 4 MG: 2 INJECTION INTRAMUSCULAR; INTRAVENOUS at 20:56

## 2018-04-10 RX ADMIN — ONDANSETRON 4 MG: 2 INJECTION INTRAMUSCULAR; INTRAVENOUS at 21:35

## 2018-04-10 RX ADMIN — ACETAMINOPHEN 1000 MG: 500 TABLET, FILM COATED ORAL at 03:03

## 2018-04-10 RX ADMIN — SODIUM CHLORIDE, POTASSIUM CHLORIDE, SODIUM LACTATE AND CALCIUM CHLORIDE: 600; 310; 30; 20 INJECTION, SOLUTION INTRAVENOUS at 21:55

## 2018-04-10 RX ADMIN — MORPHINE SULFATE 2 MG: 10 INJECTION INTRAVENOUS at 19:01

## 2018-04-10 RX ADMIN — ACETAMINOPHEN 1000 MG: 500 TABLET, FILM COATED ORAL at 08:34

## 2018-04-10 RX ADMIN — NIFEDIPINE 30 MG: 30 TABLET, FILM COATED, EXTENDED RELEASE ORAL at 08:34

## 2018-04-10 RX ADMIN — BUPIVACAINE HYDROCHLORIDE IN DEXTROSE 1.4 ML: 7.5 INJECTION, SOLUTION SUBARACHNOID at 21:30

## 2018-04-10 RX ADMIN — SODIUM CHLORIDE, POTASSIUM CHLORIDE, SODIUM LACTATE AND CALCIUM CHLORIDE 1000 ML/HR: 600; 310; 30; 20 INJECTION, SOLUTION INTRAVENOUS at 21:16

## 2018-04-10 NOTE — PLAN OF CARE
Problem: Patient Care Overview  Goal: Plan of Care Review   04/08/18 1622   Coping/Psychosocial   Plan of Care Reviewed With patient   Plan of Care Review   Progress no change     Goal: Individualization and Mutuality   04/08/18 1622   Individualization   Patient Specific Goals (Include Timeframe) stay pregnant through steroid window, healthy mom and baby     Goal: Discharge Needs Assessment   04/08/18 0358 04/08/18 0400 04/08/18 1622   Discharge Needs Assessment   Readmission Within the Last 30 Days current reason for admission unrelated to previous admission --  --    Concerns to be Addressed --  --  no discharge needs identified   Patient/Family Anticipates Transition to home --  --    Patient/Family Anticipated Services at Transition none --  --    Transportation Anticipated car, drives self --  --    Disability   Equipment Currently Used at Home --  none --        Problem: Hypertensive Disorders in Pregnancy (Adult,Obstetrics,Pediatric)  Goal: Signs and Symptoms of Listed Potential Problems Will be Absent, Minimized or Managed (Hypertensive Disorders in Pregnancy)  Outcome: Ongoing (interventions implemented as appropriate)   04/08/18 1622   Goal/Outcome Evaluation   Problems Assessed (Hypertensive Disorders in Pregnancy) all   Problems Present (Hypertensive/in PG) none

## 2018-04-10 NOTE — PROGRESS NOTES
Maternal-Fetal Medicine   Progress Note    Patient name: Tatianna Diane  YOB: 1995   MRN: [id number]  Admission Date: 2018  Date of Service: 4/10/2018    Tatianna Diane is a 22 y.o.    at 30w1d  admitted on 2018 for Pre-eclampsia    Hospital day 2    Diagnoses:   Patient Active Problem List   Diagnosis   • Annual GYN exam w/o problem   • Prenatal care, subsequent pregnancy   • Previous pregnancy (FOB's) with HLHS - normal echo at PDC 24 weeks   • LVEIF   • Pre-eclampsia   • IUGR in third trimester       Subjective:      Tatianna has no complaints today.   Patient reports headache:   Patient denies  right upper quadrant pain but does complain of mid epigastric and chest pain  Reports fetal movement is normal  Denies leakage of amniotic fluid.  Denies vaginal bleeding    Objective:     Vital signs:  Temp:  [97.8 °F (36.6 °C)-98.6 °F (37 °C)] 97.8 °F (36.6 °C)  Heart Rate:  [54-82] 60  Resp:  [16-20] 16  BP: (124-175)/(81-97) 154/95    Abdomen: soft, nontender  Uterus: gravid, nontender, fundal height lags from dating criteria  Extremities: nontender; no edema     Fetal heart rate tracing review  FHT's: Category 1  TOCO: irregular    Cervix: last check      Most recent ultrasound:  Yesterday    Medications:    prenatal vitamin 27-0.8 1 tablet Oral Nightly      •  acetaminophen  •  ondansetron  •  promethazine    Labs:  Lab Results   Component Value Date    HGB 12.5 04/10/2018     Lab Results   Component Value Date    GLUCOSE 89 2018     EKG  Sinus bradycardia with 1st degree AV block  Right axis deviation  Incomplete right bundle branch block  Possible Right ventricular hypertrophy    Assessment/Plan:      Tatianna is a 22 y.o.    at 30w1d.  1. Pre-eclampsia:   2.  Intrauterine growth restriction with estimated fetal weight less than the 10th percentile for gestational age  3.  Father of the fetus with a previous child with hypoplastic left heart syndrome  4.  Has received 2  doses of steroids for lung maturity enhancement  5.  Anxiety  6.  EKG with sinus bradycardia and first-degree AV block as well as possible right ventricular hypertrophy    Plan:   1.  Follow-up 24-hour urine studies  2.  Follow-up preeclampsia panel from this morning  3.  Nonstress test this morning  4.  If liver enzymes are normal, maternal echocardiogram  5.  Begin nifedipine 30 mg XL by mouth daily    I spent 15 minutes with this patient of which greater than 50% was face to face counseling and coordination of care.  All questions were answered to the best of my ablity.    David Young MD  4/10/2018  8:21 AM

## 2018-04-10 NOTE — PROGRESS NOTES
4/10/2018  HD:2  22 y.o. yo female  at 30w1d with increased  BP and chest pain    Subjective   Tatianna continues to complain of chest pain with elevated Bp.  She received Morphine on  which helped with her pain.  Denies SOB  Active fetal movement     Objective   Vital signs:  Temp:  [97.8 °F (36.6 °C)-98.6 °F (37 °C)] 97.9 °F (36.6 °C)  Heart Rate:  [51-82] 60  Resp:  [16-20] 18  BP: (127-185)/() 183/104    Abdomen: soft, nontender  Uterus: gravid, nontender  Extremities: nontender; no edema         Labs:  Lab Results   Component Value Date    WBC 19.47 (H) 04/10/2018    HGB 12.5 04/10/2018    HCT 37.2 04/10/2018    MCV 94.4 04/10/2018     04/10/2018    HEPBSAG Non-Reactive 2017       Results from last 7 days  Lab Units 04/10/18  0749 18  1910 18  0402   AST (SGOT) U/L 23 23 31  31     Results from last 7 days  Lab Units 04/10/18  0749 18  1910 18  0402   ALT (SGPT) U/L 23 26 27  27      Results from last 7 days  Lab Units 04/10/18  0749 18  19118  0402   CREATININE mg/dL 1.00 1.20 0.90  0.92      Results from last 7 days  Lab Units 04/10/18  0749 18  0402   BILIRUBIN mg/dL 0.3 0.3 0.4  0.4     Echo:  Spoke to Dr. Brooks and Echo is normal - he recommends hydralazine 50 mg q 8 hr    Assessment  1.   22 y.o. yo female  at 30w1d  2.  Severe Hypertension, normal echo    Plan  1. Start Hydralazine 50 mg PO every 8 hours  2.  Morphine now for chest pain    This note has been electronically signed.    Gloria Sorensen MD  April 10, 2018

## 2018-04-10 NOTE — PROGRESS NOTES
[facility]  Tatianna Diane  : 1995  MRN: [id number]  CSN: [id number]    Antepartum Progress Note    Subjective   Events of last night reviewed.  EKG reviewed.  Continues to have chest pains.  Understands the topics discussed by .     Objective     Min/max vitals past 24 hours:   Temp  Min: 97.8 °F (36.6 °C)  Max: 98.6 °F (37 °C)  BP  Min: 124/81  Max: 175/90  Pulse  Min: 54  Max: 82  Resp  Min: 16  Max: 20            Lab Results   Component Value Date     04/10/2018    HGB 12.5 04/10/2018    HCT 37.2 04/10/2018    WBC 19.47 (H) 04/10/2018     PEP: pending  24 hour urine: pending       Assessment   1. IUP at 30w0d  2. PIH with severe features - BP elevated currently  3. Anxiety  4. IUGR     Plan   1. Continue inpatient care  2. Await PEP  3. First dose of Procardia given this morning for chronic control of blood pressure  4. Labetalol when necessary for acute blood pressure elevations  5. Add H2 blocker to treat possible GERD as etiology for chest pain  6. Appreciate PDC input    Chacorta Romero MD  4/10/2018  8:47 AM           Hemostasis: Electrocautery

## 2018-04-10 NOTE — PROGRESS NOTES
Anatoly Diane  : 1995  MRN: [id number]  CSN: [id number]    Antepartum Progress Note    HPI:  Called to room due to Tatianna complaining of intermittent chest pain.  She used to run track and she says it feels like when she used to run in the cold.  It is worse with inspiration.   Her pulse remains low in the 60s  ( this is baseline for her)  Her SaO2 in 95% or greater on room air when awake.  She does not feel short  Of breath, but just the mid sternal pain with breathing.    She does not seem to be bothered and is easily distracted from the pain.  An EKG was ordered and right after the EKG when I went in to re-evaluate her, she was sleeping and said she felt better.       Objective     Min/max vitals past 24 hours:   Temp  Min: 97.5 °F (36.4 °C)  Max: 98.6 °F (37 °C)  BP  Min: 124/81  Max: 175/90  Pulse  Min: 54  Max: 82  Resp  Min: 16  Max: 20         General: no acute distress   Heart: S1, S2 normal  systolic murmur II/VI  Bradycardia   Lungs: clear to auscultation bilaterally   Abdomen: Not performed.         EKG:                        Assessment   1. IUP at 30w1d  2. Pre-eclampsia  3. IUGR     Plan    1. In steroid window this am.   2. EKG   3. Continue in-house management per PDC recommendations    Toño Jack MD  4/10/2018  4:57 AM

## 2018-04-11 PROBLEM — O36.5930 POOR FETAL GROWTH AFFECTING MANAGEMENT OF MOTHER IN THIRD TRIMESTER: Status: RESOLVED | Noted: 2018-04-09 | Resolved: 2018-04-11

## 2018-04-11 PROBLEM — O28.3 ECHOGENIC INTRACARDIAC FOCUS OF FETUS ON PRENATAL ULTRASOUND: Status: RESOLVED | Noted: 2018-03-01 | Resolved: 2018-04-11

## 2018-04-11 PROBLEM — Z34.80 PRENATAL CARE, SUBSEQUENT PREGNANCY: Status: RESOLVED | Noted: 2017-11-12 | Resolved: 2018-04-11

## 2018-04-11 PROBLEM — N28.9: Status: RESOLVED | Noted: 2018-04-10 | Resolved: 2018-04-11

## 2018-04-11 PROBLEM — O26.833: Status: RESOLVED | Noted: 2018-04-10 | Resolved: 2018-04-11

## 2018-04-11 PROBLEM — O09.299 PREVIOUS PREGNANCY WITH CONGENITAL HEART DEFECT, CURRENTLY PREGNANT: Status: RESOLVED | Noted: 2018-02-01 | Resolved: 2018-04-11

## 2018-04-11 LAB
ALP SERPL-CCNC: 101 U/L (ref 25–100)
ALT SERPL W P-5'-P-CCNC: 20 U/L (ref 7–40)
AST SERPL-CCNC: 25 U/L (ref 0–33)
BILIRUB SERPL-MCNC: 0.4 MG/DL (ref 0.3–1.2)
CREAT BLD-MCNC: 0.9 MG/DL (ref 0.6–1.3)
DEPRECATED RDW RBC AUTO: 45.9 FL (ref 37–54)
ERYTHROCYTE [DISTWIDTH] IN BLOOD BY AUTOMATED COUNT: 13.4 % (ref 11.3–14.5)
HCT VFR BLD AUTO: 34.2 % (ref 34.5–44)
HGB BLD-MCNC: 11.3 G/DL (ref 11.5–15.5)
LDH SERPL-CCNC: 306 U/L (ref 120–246)
MCH RBC QN AUTO: 31.5 PG (ref 27–31)
MCHC RBC AUTO-ENTMCNC: 33 G/DL (ref 32–36)
MCV RBC AUTO: 95.3 FL (ref 80–99)
PLATELET # BLD AUTO: 179 10*3/MM3 (ref 150–450)
PMV BLD AUTO: 12.3 FL (ref 6–12)
RBC # BLD AUTO: 3.59 10*6/MM3 (ref 3.89–5.14)
URATE SERPL-MCNC: 5.6 MG/DL (ref 3.1–7.8)
WBC NRBC COR # BLD: 20.53 10*3/MM3 (ref 3.5–10.8)

## 2018-04-11 PROCEDURE — 82247 BILIRUBIN TOTAL: CPT | Performed by: OBSTETRICS & GYNECOLOGY

## 2018-04-11 PROCEDURE — 84075 ASSAY ALKALINE PHOSPHATASE: CPT | Performed by: OBSTETRICS & GYNECOLOGY

## 2018-04-11 PROCEDURE — 82565 ASSAY OF CREATININE: CPT | Performed by: OBSTETRICS & GYNECOLOGY

## 2018-04-11 PROCEDURE — 84460 ALANINE AMINO (ALT) (SGPT): CPT | Performed by: OBSTETRICS & GYNECOLOGY

## 2018-04-11 PROCEDURE — 84450 TRANSFERASE (AST) (SGOT): CPT | Performed by: OBSTETRICS & GYNECOLOGY

## 2018-04-11 PROCEDURE — 83615 LACTATE (LD) (LDH) ENZYME: CPT | Performed by: OBSTETRICS & GYNECOLOGY

## 2018-04-11 PROCEDURE — 85027 COMPLETE CBC AUTOMATED: CPT | Performed by: OBSTETRICS & GYNECOLOGY

## 2018-04-11 PROCEDURE — 84550 ASSAY OF BLOOD/URIC ACID: CPT | Performed by: OBSTETRICS & GYNECOLOGY

## 2018-04-11 RX ORDER — KETOROLAC TROMETHAMINE 30 MG/ML
30 INJECTION, SOLUTION INTRAMUSCULAR; INTRAVENOUS EVERY 6 HOURS
Status: DISCONTINUED | OUTPATIENT
Start: 2018-04-11 | End: 2018-04-11

## 2018-04-11 RX ORDER — SIMETHICONE 80 MG
80 TABLET,CHEWABLE ORAL 4 TIMES DAILY PRN
Status: DISCONTINUED | OUTPATIENT
Start: 2018-04-11 | End: 2018-04-14 | Stop reason: HOSPADM

## 2018-04-11 RX ORDER — MAGNESIUM SULF/RINGERS LACTATE 40 G/500ML
1 PLASTIC BAG, INJECTION (ML) INTRAVENOUS CONTINUOUS
Status: DISPENSED | OUTPATIENT
Start: 2018-04-11 | End: 2018-04-11

## 2018-04-11 RX ORDER — NIFEDIPINE 30 MG/1
30 TABLET, EXTENDED RELEASE ORAL
Status: DISCONTINUED | OUTPATIENT
Start: 2018-04-11 | End: 2018-04-11

## 2018-04-11 RX ORDER — HYDROMORPHONE HYDROCHLORIDE 1 MG/ML
0.5 INJECTION, SOLUTION INTRAMUSCULAR; INTRAVENOUS; SUBCUTANEOUS
Status: ACTIVE | OUTPATIENT
Start: 2018-04-11 | End: 2018-04-12

## 2018-04-11 RX ORDER — PROMETHAZINE HYDROCHLORIDE 12.5 MG/1
12.5 SUPPOSITORY RECTAL EVERY 6 HOURS PRN
Status: DISCONTINUED | OUTPATIENT
Start: 2018-04-11 | End: 2018-04-12

## 2018-04-11 RX ORDER — DEXTROSE, SODIUM CHLORIDE, SODIUM LACTATE, POTASSIUM CHLORIDE, AND CALCIUM CHLORIDE 5; .6; .31; .03; .02 G/100ML; G/100ML; G/100ML; G/100ML; G/100ML
125 INJECTION, SOLUTION INTRAVENOUS CONTINUOUS
Status: DISCONTINUED | OUTPATIENT
Start: 2018-04-11 | End: 2018-04-11

## 2018-04-11 RX ORDER — ACETAMINOPHEN 325 MG/1
650 TABLET ORAL ONCE AS NEEDED
Status: DISCONTINUED | OUTPATIENT
Start: 2018-04-11 | End: 2018-04-11

## 2018-04-11 RX ORDER — DIPHENHYDRAMINE HCL 25 MG
25 CAPSULE ORAL EVERY 4 HOURS PRN
Status: DISCONTINUED | OUTPATIENT
Start: 2018-04-11 | End: 2018-04-14 | Stop reason: HOSPADM

## 2018-04-11 RX ORDER — HYDROCODONE BITARTRATE AND ACETAMINOPHEN 5; 325 MG/1; MG/1
1 TABLET ORAL EVERY 4 HOURS PRN
Status: DISCONTINUED | OUTPATIENT
Start: 2018-04-11 | End: 2018-04-14 | Stop reason: HOSPADM

## 2018-04-11 RX ORDER — ONDANSETRON 4 MG/1
4 TABLET, FILM COATED ORAL EVERY 6 HOURS PRN
Status: DISCONTINUED | OUTPATIENT
Start: 2018-04-11 | End: 2018-04-12

## 2018-04-11 RX ORDER — LISINOPRIL 5 MG/1
5 TABLET ORAL
Status: DISCONTINUED | OUTPATIENT
Start: 2018-04-11 | End: 2018-04-13

## 2018-04-11 RX ORDER — NIFEDIPINE 30 MG/1
30 TABLET, EXTENDED RELEASE ORAL
Status: DISCONTINUED | OUTPATIENT
Start: 2018-04-12 | End: 2018-04-12

## 2018-04-11 RX ORDER — HYDROCODONE BITARTRATE AND ACETAMINOPHEN 7.5; 325 MG/1; MG/1
1 TABLET ORAL EVERY 4 HOURS PRN
Status: DISCONTINUED | OUTPATIENT
Start: 2018-04-11 | End: 2018-04-14 | Stop reason: HOSPADM

## 2018-04-11 RX ORDER — PROMETHAZINE HYDROCHLORIDE 25 MG/ML
12.5 INJECTION, SOLUTION INTRAMUSCULAR; INTRAVENOUS EVERY 6 HOURS PRN
Status: DISCONTINUED | OUTPATIENT
Start: 2018-04-11 | End: 2018-04-12

## 2018-04-11 RX ORDER — PROMETHAZINE HYDROCHLORIDE 25 MG/1
25 TABLET ORAL EVERY 6 HOURS PRN
Status: DISCONTINUED | OUTPATIENT
Start: 2018-04-11 | End: 2018-04-14 | Stop reason: HOSPADM

## 2018-04-11 RX ORDER — NALOXONE HCL 0.4 MG/ML
0.4 VIAL (ML) INJECTION
Status: ACTIVE | OUTPATIENT
Start: 2018-04-11 | End: 2018-04-12

## 2018-04-11 RX ORDER — IBUPROFEN 600 MG/1
600 TABLET ORAL ONCE AS NEEDED
Status: DISCONTINUED | OUTPATIENT
Start: 2018-04-11 | End: 2018-04-11

## 2018-04-11 RX ORDER — MORPHINE SULFATE 2 MG/ML
2 INJECTION, SOLUTION INTRAMUSCULAR; INTRAVENOUS
Status: DISCONTINUED | OUTPATIENT
Start: 2018-04-11 | End: 2018-04-11

## 2018-04-11 RX ORDER — ONDANSETRON 2 MG/ML
4 INJECTION INTRAMUSCULAR; INTRAVENOUS EVERY 6 HOURS PRN
Status: DISCONTINUED | OUTPATIENT
Start: 2018-04-11 | End: 2018-04-11

## 2018-04-11 RX ORDER — METOCLOPRAMIDE HYDROCHLORIDE 5 MG/ML
10 INJECTION INTRAMUSCULAR; INTRAVENOUS ONCE AS NEEDED
Status: DISCONTINUED | OUTPATIENT
Start: 2018-04-11 | End: 2018-04-11

## 2018-04-11 RX ORDER — LANOLIN 100 %
OINTMENT (GRAM) TOPICAL
Status: DISCONTINUED | OUTPATIENT
Start: 2018-04-11 | End: 2018-04-14 | Stop reason: HOSPADM

## 2018-04-11 RX ORDER — DOCUSATE SODIUM 100 MG/1
100 CAPSULE, LIQUID FILLED ORAL 2 TIMES DAILY PRN
Status: DISCONTINUED | OUTPATIENT
Start: 2018-04-11 | End: 2018-04-14 | Stop reason: HOSPADM

## 2018-04-11 RX ORDER — SODIUM CHLORIDE, SODIUM LACTATE, POTASSIUM CHLORIDE, CALCIUM CHLORIDE 600; 310; 30; 20 MG/100ML; MG/100ML; MG/100ML; MG/100ML
100 INJECTION, SOLUTION INTRAVENOUS CONTINUOUS
Status: DISCONTINUED | OUTPATIENT
Start: 2018-04-11 | End: 2018-04-11

## 2018-04-11 RX ORDER — SODIUM CHLORIDE, SODIUM LACTATE, POTASSIUM CHLORIDE, CALCIUM CHLORIDE 600; 310; 30; 20 MG/100ML; MG/100ML; MG/100ML; MG/100ML
100 INJECTION, SOLUTION INTRAVENOUS CONTINUOUS
Status: ACTIVE | OUTPATIENT
Start: 2018-04-11 | End: 2018-04-11

## 2018-04-11 RX ORDER — IBUPROFEN 600 MG/1
600 TABLET ORAL EVERY 6 HOURS PRN
Status: DISCONTINUED | OUTPATIENT
Start: 2018-04-11 | End: 2018-04-11

## 2018-04-11 RX ORDER — ONDANSETRON 2 MG/ML
4 INJECTION INTRAMUSCULAR; INTRAVENOUS ONCE
Status: DISCONTINUED | OUTPATIENT
Start: 2018-04-11 | End: 2018-04-11

## 2018-04-11 RX ORDER — ONDANSETRON 2 MG/ML
4 INJECTION INTRAMUSCULAR; INTRAVENOUS EVERY 6 HOURS PRN
Status: DISCONTINUED | OUTPATIENT
Start: 2018-04-11 | End: 2018-04-12

## 2018-04-11 RX ADMIN — SIMETHICONE CHEW TAB 80 MG 80 MG: 80 TABLET ORAL at 06:35

## 2018-04-11 RX ADMIN — DOCUSATE SODIUM 100 MG: 100 CAPSULE, LIQUID FILLED ORAL at 08:49

## 2018-04-11 RX ADMIN — NIFEDIPINE 30 MG: 30 TABLET, FILM COATED, EXTENDED RELEASE ORAL at 08:48

## 2018-04-11 RX ADMIN — SODIUM CHLORIDE, POTASSIUM CHLORIDE, SODIUM LACTATE AND CALCIUM CHLORIDE 100 ML/HR: 600; 310; 30; 20 INJECTION, SOLUTION INTRAVENOUS at 19:56

## 2018-04-11 RX ADMIN — SODIUM CHLORIDE, POTASSIUM CHLORIDE, SODIUM LACTATE AND CALCIUM CHLORIDE 100 ML/HR: 600; 310; 30; 20 INJECTION, SOLUTION INTRAVENOUS at 10:47

## 2018-04-11 RX ADMIN — HYDROCODONE BITARTRATE AND ACETAMINOPHEN 1 TABLET: 7.5; 325 TABLET ORAL at 22:45

## 2018-04-11 RX ADMIN — SIMETHICONE CHEW TAB 80 MG 80 MG: 80 TABLET ORAL at 11:12

## 2018-04-11 RX ADMIN — HYDROCODONE BITARTRATE AND ACETAMINOPHEN 1 TABLET: 7.5; 325 TABLET ORAL at 18:48

## 2018-04-11 RX ADMIN — HYDROCODONE BITARTRATE AND ACETAMINOPHEN 1 TABLET: 7.5; 325 TABLET ORAL at 08:49

## 2018-04-11 RX ADMIN — HYDROCODONE BITARTRATE AND ACETAMINOPHEN 1 TABLET: 7.5; 325 TABLET ORAL at 13:34

## 2018-04-11 RX ADMIN — LISINOPRIL 5 MG: 5 TABLET ORAL at 10:47

## 2018-04-11 RX ADMIN — HYDROCODONE BITARTRATE AND ACETAMINOPHEN 1 TABLET: 7.5; 325 TABLET ORAL at 04:16

## 2018-04-11 RX ADMIN — HYDROCODONE BITARTRATE AND ACETAMINOPHEN 1 TABLET: 7.5; 325 TABLET ORAL at 00:16

## 2018-04-11 RX ADMIN — SIMETHICONE CHEW TAB 80 MG 80 MG: 80 TABLET ORAL at 14:39

## 2018-04-11 NOTE — ANESTHESIA PREPROCEDURE EVALUATION
Anesthesia Evaluation     Patient summary reviewed and Nursing notes reviewed                Airway   Mallampati: I  TM distance: <3 FB  Neck ROM: full  no difficulty expected  Dental - normal exam     Pulmonary - negative pulmonary ROS and normal exam   Cardiovascular - normal exam    (+) hypertension,       Neuro/Psych  (+) headaches, psychiatric history,     GI/Hepatic/Renal/Endo    (+)   hypothyroidism,     Musculoskeletal (-) negative ROS    Abdominal  - normal exam    Bowel sounds: normal.   Substance History - negative use     OB/GYN    (+) Pregnant, Preeclampsia, pregnancy induced hypertension        Other                        Anesthesia Plan    ASA 3 - emergent     spinal     Anesthetic plan and risks discussed with patient.  Use of blood products discussed with patient .   Plan discussed with attending.

## 2018-04-11 NOTE — OP NOTE
Anatoly Diane  : 1995  MRN: [id number]  CSN: [id number]     Section Operative Note    Pre-Operative Dx:   1. Intrauterine pregnancy at 30w1d weeks   2. Abruptio placenta  3. Severe preeclampsia      Postoperative dx:    1. Intrauterine pregnancy at 30w1d weeks 2.   Abruptio placenta  3. Severe preeclampsia        Procedure: Primary  (LTCS)  - 1 layer closure       Surgeon: Jamal Almeida MD   Assistant: Lynne Nuno       Anesthesia: Spinal        EBL: 700 mls.   IV Fluids: 1800 mls.   UOP: 300 mls.     Antibiotics: cefazolin 2 gms     Infant:      Name:        Gender: female  infant    Weight: 940 g (2 lb 1.2 oz)     Apgars: 7   @ 1 minute / 8   @ 5 minutes    Cord gases: Venous:  @BABYNOHDR(BRIEFLAB, PHCVEN, BECVEN)@     Arterial:  @BABYNOHDR(BRIEFLAB, PHCART, BECART)@     Procedure Details:   After the patient was adequately anesthetized, she was sterilely prepped and draped in the dorsal supine left lateral tilt position. A Pfannenstiel incision was created sharply with the knife. It was carried down to the fascia with the Bovie.  The fascia was cut transversely with the knife and extended in a curvilinear fashion with scissors. The fascia was freed from its midline insertion superiorly and inferiorly. Rectus muscles were  in the midline. The peritoneum was sharply entered. The floresita ring retractor was placed without problem. A bladder flap was sharply created. The lower uterine segment was scored transversely with the knife. Clear amniotic fluid was seen. The infant's head was delivered atraumatically. The mouth and nose were bulb suctioned. The infant was handed to the delivery team which was in attendance. The placenta was spontaneously extracted. The uterus was exteriorized and wiped free of debris and clot. The uterine incision was closed with #0 Monocryl in a continuous running locking fashion. The bladder flap was reapproximated.     The uterus  was returned to the abdomen. The paracolic gutters were cleared of debris and clot. Parietal peritoneum and posterior rectus sheath were closed with 3-0 Vicryl. The fascia was closed with 0 PDS sewing from either corner, tying in the midline and burying all knots. The subcutaneous tissue was copiously irrigated. Kevin's fascia was closed with 3-0 plain gut and the skin was closed with 3-0 Monocryl subcuticularly. Skin glue applied to the incision.  All counts were correct.         Complications:   None      Disposition:   Mother to Remain in LD  in stable condition currently.   Baby to NICU  in stable condition currently.       Jamal Almeida MD  4/10/2018  11:11 PM

## 2018-04-11 NOTE — ANESTHESIA POSTPROCEDURE EVALUATION
Patient: Tatianna Diane    Procedure Summary     Date:  04/10/18 Room / Location:  [facility] LABOR DELIVERY   [facility]IV[facility]    Anesthesia Start:   Anesthesia Stop:      Procedure:   SECTION PRIMARY (N/A Abdomen) Diagnosis:      Surgeon:  Jamal Almeida MD Provider:  Claude Wyman MD    Anesthesia Type:  spinal ASA Status:  3 - Emergent          Anesthesia Type: spinal  Last vitals  BP   174/100 (04/10/18 1943)   Temp   98.7 °F (37.1 °C) (04/10/18 2051)   Pulse   55 (04/10/18 1943)   Resp   18 (04/10/18 2036)     SpO2   91 % (04/10/18 1913)     Post Anesthesia Care and Evaluation    Patient location during evaluation: bedside  Patient participation: complete - patient participated  Level of consciousness: awake and alert  Pain score: 0  Pain management: adequate  Airway patency: patent  Anesthetic complications: No anesthetic complications    Cardiovascular status: acceptable  Respiratory status: acceptable  Hydration status: acceptable

## 2018-04-11 NOTE — ANESTHESIA POSTPROCEDURE EVALUATION
Patient: Tatianna Diane    Procedure Summary     Date:  04/10/18 Room / Location:  [facility] LABOR DELIVERY   [facility]IV[facility]    Anesthesia Start:   Anesthesia Stop:      Procedure:   SECTION PRIMARY (N/A Abdomen) Diagnosis:      Surgeon:  Jamal Almeida MD Provider:  Claude Wyman MD    Anesthesia Type:  spinal ASA Status:  3 - Emergent          Anesthesia Type: spinal  Last vitals  BP   123/85 (18 0842)   Temp   98.2 °F (36.8 °C) (18 0842)   Pulse   58 (18 0842)   Resp   16 (18 08)     SpO2   98 % (18 0012)     Post Anesthesia Care and Evaluation    Patient location during evaluation: bedside  Patient participation: complete - patient participated  Level of consciousness: awake and alert  Pain management: adequate  Airway patency: patent  Anesthetic complications: No anesthetic complications    Cardiovascular status: acceptable  Respiratory status: acceptable  Hydration status: acceptable  Post Neuraxial Block status: Motor and sensory function returned to baseline and No signs or symptoms of PDPH

## 2018-04-11 NOTE — ANESTHESIA PROCEDURE NOTES
Spinal Block    Patient location during procedure: OR  Start Time: 4/10/2018 9:30 PM  Performed By  Anesthesiologist: BABAR SHIN  Preanesthetic Checklist  Completed: patient identified, site marked, surgical consent, pre-op evaluation, timeout performed, IV checked, risks and benefits discussed and monitors and equipment checked  Spinal Block Prep:  Patient Position:sitting  Sterile Tech:cap, gloves, sterile barriers and mask  Prep:alcohol swabs  Patient Monitoring:EKG, continuous pulse oximetry and blood pressure monitoring  Spinal Block Procedure  Approach:midline  Guidance:palpation technique  Location:L2-L3  Needle Type:Hardik  Needle Gauge:25 G  Placement of Spinal needle event:cerebrospinal fluid aspirated  Paresthesia: no  Fluid Appearance:clear  Post Assessment  Patient Tolerance:patient tolerated the procedure well with no apparent complications  Complications no

## 2018-04-11 NOTE — INTERVAL H&P NOTE
H&P reviewed. The patient was examined and there are no changes to the H&P.  Pt has had severe range BP's all day long.  Discussed situation with Dr. Young and his recommendation is to proceed to delivery with magnesium prior (4g/1g).  Discussed with patient and she is agreeable.  Dr. Almeida has been notified and will be here soon to perform  which pt is agreeable to.

## 2018-04-11 NOTE — PROGRESS NOTES
Anatoly Diane  : 1995  MRN: [id number]  CSN: [id number]    Post-operative Day #1  Subjective   Her pain is well controlled. Vaginal bleeding is normal in amount. Her baby is doing well.     Objective     Min/max vitals past 24 hours:   Temp  Min: 98 °F (36.7 °C)  Max: 98.8 °F (37.1 °C)  BP  Min: 104/94  Max: 174/100  Pulse  Min: 50  Max: 88  Resp  Min: 14  Max: 20        General: well developed; well nourished  no acute distress   Abdomen: soft, non-tender; no masses  no umbilical or inginual hernias are present  no hepato-splenomegaly  incision is covered by bandage   Pelvic: Not performed   Ext: Calves NT     Last 3 values     Results from last 7 days  Lab Units 18  0858 04/10/18  0749 18  1910   WBC 10*3/mm3 20.53* 19.47* 17.52*   HEMOGLOBIN g/dL 11.3* 12.5 12.3   HEMATOCRIT % 34.2* 37.2 37.1   PLATELETS 10*3/mm3 179 190 203     Lab Results   Component Value Date    RH Positive 2018    HEPBSAG Non-Reactive 2017          Assessment   1. POD #1 S/P Primary  (LTCS)  - 1 layer closure  2. Postpartum HTN - BP much better since delivery     Plan   1. Continue routine post-operative care  2. Advance diet  3. D/C MgSO4 at 24 hour postpartum    Chacorta Romero MD  2018  7:14 PM

## 2018-04-11 NOTE — LACTATION NOTE
04/11/18 1830   Maternal Information   Date of Referral 04/11/18   Person Making Referral physician   Infant Reason for Referral NICU admission   Equipment Type   Breast Pump Type double electric, hospital grade   Breast Pump Flange Type hard   Breast Pump Flange Size 24 mm   Mom states nursing staff initiated pumping. Has been pumping every 2 hours during the day and plans to pump every 3 hours at night. States he is getting several mL with each pumping. Discussed importance of continuing to pump every 3 hours to get best milk supply. Reviewed pump use. Discussed milk supply. Has Medela and Spectra 2 to use at home when baby goes home. Discussed Pump for [employer] contract terms.      Mom to call for lactation services if she has questions or concerns.

## 2018-04-12 RX ADMIN — HYDROCODONE BITARTRATE AND ACETAMINOPHEN 1 TABLET: 7.5; 325 TABLET ORAL at 13:32

## 2018-04-12 RX ADMIN — Medication: at 13:09

## 2018-04-12 RX ADMIN — SIMETHICONE CHEW TAB 80 MG 80 MG: 80 TABLET ORAL at 13:32

## 2018-04-12 RX ADMIN — HYDROCODONE BITARTRATE AND ACETAMINOPHEN 1 TABLET: 5; 325 TABLET ORAL at 17:40

## 2018-04-12 RX ADMIN — SIMETHICONE CHEW TAB 80 MG 80 MG: 80 TABLET ORAL at 21:42

## 2018-04-12 RX ADMIN — SIMETHICONE CHEW TAB 80 MG 80 MG: 80 TABLET ORAL at 03:45

## 2018-04-12 RX ADMIN — HYDROCODONE BITARTRATE AND ACETAMINOPHEN 1 TABLET: 7.5; 325 TABLET ORAL at 07:59

## 2018-04-12 RX ADMIN — SIMETHICONE CHEW TAB 80 MG 80 MG: 80 TABLET ORAL at 17:40

## 2018-04-12 RX ADMIN — SIMETHICONE CHEW TAB 80 MG 80 MG: 80 TABLET ORAL at 07:59

## 2018-04-12 RX ADMIN — DOCUSATE SODIUM 100 MG: 100 CAPSULE, LIQUID FILLED ORAL at 08:00

## 2018-04-12 RX ADMIN — SIMETHICONE CHEW TAB 80 MG 80 MG: 80 TABLET ORAL at 12:07

## 2018-04-12 RX ADMIN — HYDROCODONE BITARTRATE AND ACETAMINOPHEN 1 TABLET: 7.5; 325 TABLET ORAL at 21:42

## 2018-04-12 RX ADMIN — LISINOPRIL 5 MG: 5 TABLET ORAL at 12:30

## 2018-04-12 RX ADMIN — DOCUSATE SODIUM 100 MG: 100 CAPSULE, LIQUID FILLED ORAL at 21:42

## 2018-04-12 RX ADMIN — HYDROCODONE BITARTRATE AND ACETAMINOPHEN 1 TABLET: 7.5; 325 TABLET ORAL at 03:41

## 2018-04-12 NOTE — LACTATION NOTE
Established a contract for a [facility].  Recommended patient change to maintenance mode on pump since pumped milk volumes are at about 50 mL's.

## 2018-04-12 NOTE — PROGRESS NOTES
Anatoly Diane  : 1995  MRN: [id number]  CSN: [id number]    Post-operative Day #2  Subjective   Her pain is well controlled. Vaginal bleeding is normal in amount. She is ambulating without difficulty. She is passing gas. She is voiding without difficulty. Her baby is doing well.     Objective     Min/max vitals past 24 hours:   Temp  Min: 97.3 °F (36.3 °C)  Max: 99 °F (37.2 °C)  BP  Min: 122/74  Max: 152/80  Pulse  Min: 50  Max: 72  Resp  Min: 16  Max: 20        General: well developed; well nourished  no acute distress   Abdomen: soft, non-tender; no masses  no umbilical or inginual hernias are present  no hepato-splenomegaly  incision is clean, dry, intact and without drainage   Pelvic: Not performed   Ext: Calves NT       Results from last 7 days  Lab Units 18  0858 04/10/18  0749 18  1910   WBC 10*3/mm3 20.53* 19.47* 17.52*   HEMOGLOBIN g/dL 11.3* 12.5 12.3   HEMATOCRIT % 34.2* 37.2 37.1   PLATELETS 10*3/mm3 179 190 203     Lab Results   Component Value Date    RH Positive 2018    HEPBSAG Non-Reactive 2017        Assessment   1. POD #2 S/P Primary  (LTCS)  - 1 layer closure  2. Doing well   3. H/o PIH - stable with meds     Plan   1. Continue routine post-operative care    Chacorta Romero MD  2018  7:41 AM

## 2018-04-13 PROCEDURE — 99232 SBSQ HOSP IP/OBS MODERATE 35: CPT | Performed by: OBSTETRICS & GYNECOLOGY

## 2018-04-13 RX ORDER — ACETAMINOPHEN 500 MG
1000 TABLET ORAL EVERY 6 HOURS PRN
Status: DISCONTINUED | OUTPATIENT
Start: 2018-04-13 | End: 2018-04-14 | Stop reason: HOSPADM

## 2018-04-13 RX ORDER — LISINOPRIL 10 MG/1
10 TABLET ORAL
Status: DISCONTINUED | OUTPATIENT
Start: 2018-04-13 | End: 2018-04-14 | Stop reason: HOSPADM

## 2018-04-13 RX ADMIN — SIMETHICONE CHEW TAB 80 MG 80 MG: 80 TABLET ORAL at 22:51

## 2018-04-13 RX ADMIN — SIMETHICONE CHEW TAB 80 MG 80 MG: 80 TABLET ORAL at 19:00

## 2018-04-13 RX ADMIN — SIMETHICONE CHEW TAB 80 MG 80 MG: 80 TABLET ORAL at 12:24

## 2018-04-13 RX ADMIN — HYDROCODONE BITARTRATE AND ACETAMINOPHEN 1 TABLET: 5; 325 TABLET ORAL at 01:41

## 2018-04-13 RX ADMIN — POLYETHYLENE GLYCOL (3350) 17 G: 17 POWDER, FOR SOLUTION ORAL at 00:59

## 2018-04-13 RX ADMIN — DOCUSATE SODIUM 100 MG: 100 CAPSULE, LIQUID FILLED ORAL at 12:24

## 2018-04-13 RX ADMIN — HYDROCODONE BITARTRATE AND ACETAMINOPHEN 1 TABLET: 7.5; 325 TABLET ORAL at 06:52

## 2018-04-13 RX ADMIN — ACETAMINOPHEN 1000 MG: 500 TABLET, FILM COATED ORAL at 22:51

## 2018-04-13 RX ADMIN — LISINOPRIL 10 MG: 10 TABLET ORAL at 08:02

## 2018-04-13 RX ADMIN — HYDROCODONE BITARTRATE AND ACETAMINOPHEN 1 TABLET: 7.5; 325 TABLET ORAL at 19:00

## 2018-04-13 RX ADMIN — HYDROCODONE BITARTRATE AND ACETAMINOPHEN 1 TABLET: 7.5; 325 TABLET ORAL at 12:24

## 2018-04-13 RX ADMIN — DOCUSATE SODIUM 100 MG: 100 CAPSULE, LIQUID FILLED ORAL at 19:00

## 2018-04-13 NOTE — PROGRESS NOTES
Anatoly Diane  : 1995  MRN: [id number]  CSN: [id number]    Post-operative Day #3  Subjective   Her pain is well controlled. Vaginal bleeding is normal in amount. She is ambulating without difficulty. She is passing gas. She is voiding without difficulty. Her baby is doing well.     Objective     Min/max vitals past 24 hours:   Temp  Min: 97.8 °F (36.6 °C)  Max: 98.4 °F (36.9 °C)  BP  Min: 130/87  Max: 166/105  Pulse  Min: 54  Max: 80  Resp  Min: 14  Max: 16        General: well developed; well nourished  no acute distress   Abdomen: soft, non-tender; no masses  no umbilical or inginual hernias are present  no hepato-splenomegaly   Pelvic: Not performed   Ext: Calves NT       Results from last 7 days  Lab Units 18  0858 04/10/18  0749 18  1910   WBC 10*3/mm3 20.53* 19.47* 17.52*   HEMOGLOBIN g/dL 11.3* 12.5 12.3   HEMATOCRIT % 34.2* 37.2 37.1   PLATELETS 10*3/mm3 179 190 203     Lab Results   Component Value Date    RUBELLAABIGG 71.6 2017    RUBELLAABIGG Immune 2017    ABO O 2018    RH Positive 2018    HEPBSAG Non-Reactive 2017        Assessment   1. POD #3 S/P Primary  (LTCS)  - 1 layer closure     Plan   1. Continue routine post-operative care  2. Ambulate   3. Anticipate discharge home tomorrow    Jamal Almeida MD  2018  8:21 AM

## 2018-04-14 VITALS
DIASTOLIC BLOOD PRESSURE: 98 MMHG | SYSTOLIC BLOOD PRESSURE: 131 MMHG | RESPIRATION RATE: 16 BRPM | OXYGEN SATURATION: 96 % | BODY MASS INDEX: 36.25 KG/M2 | TEMPERATURE: 98.3 F | WEIGHT: 197 LBS | HEART RATE: 74 BPM | HEIGHT: 62 IN

## 2018-04-14 LAB — GLUCOSE BLDC GLUCOMTR-MCNC: 84 MG/DL (ref 70–130)

## 2018-04-14 PROCEDURE — 99238 HOSP IP/OBS DSCHRG MGMT 30/<: CPT | Performed by: OBSTETRICS & GYNECOLOGY

## 2018-04-14 PROCEDURE — 82962 GLUCOSE BLOOD TEST: CPT

## 2018-04-14 RX ORDER — HYDROCODONE BITARTRATE AND ACETAMINOPHEN 5; 325 MG/1; MG/1
1 TABLET ORAL EVERY 4 HOURS PRN
Qty: 30 TABLET | Refills: 0 | Status: SHIPPED | OUTPATIENT
Start: 2018-04-14 | End: 2018-04-18

## 2018-04-14 RX ORDER — LISINOPRIL 10 MG/1
10 TABLET ORAL
Qty: 30 TABLET | Refills: 1 | Status: SHIPPED | OUTPATIENT
Start: 2018-04-15 | End: 2018-05-24

## 2018-04-14 RX ORDER — ACETAMINOPHEN 500 MG
1000 TABLET ORAL EVERY 6 HOURS PRN
Start: 2018-04-14 | End: 2018-05-24

## 2018-04-14 RX ADMIN — HYDROCODONE BITARTRATE AND ACETAMINOPHEN 1 TABLET: 5; 325 TABLET ORAL at 12:59

## 2018-04-14 RX ADMIN — DOCUSATE SODIUM 100 MG: 100 CAPSULE, LIQUID FILLED ORAL at 08:16

## 2018-04-14 RX ADMIN — SIMETHICONE CHEW TAB 80 MG 80 MG: 80 TABLET ORAL at 08:17

## 2018-04-14 RX ADMIN — HYDROCODONE BITARTRATE AND ACETAMINOPHEN 1 TABLET: 5; 325 TABLET ORAL at 06:38

## 2018-04-14 RX ADMIN — LISINOPRIL 10 MG: 10 TABLET ORAL at 08:16

## 2018-04-14 NOTE — DISCHARGE SUMMARY
Discharge Summary    Date of Admission: 2018  Date of Discharge:  2018      Patient: Tatianna Diane      MR#:[id number]    Primary Surgeon/OB: Jamal Almeida MD    Discharge Surgeon/OB:cheri    Presenting Problem/History of Present Illness  Pre-eclampsia [O14.90]     Patient Active Problem List   Diagnosis   • Annual GYN exam w/o problem   • Pre-eclampsia   • Postpartum care following  delivery 4/10/2018         Discharge Diagnosis:  section at 30w1d    Procedures:  , Low Transverse     4/10/2018    10:02 PM          Discharge Date: 2018; Discharge Time: 10:20 AM    Early Discharge:  YES-I AUTHORIZE ONE MATERNAL- HOME VISIT    Hospital Course  Patient is a 22 y.o. female  at 30w1d status post  section with uneventful postoperative recovery.  Patient was advanced to regular diet on postoperative day#1.  On discharge, ambulating, tolerating a regular diet without any difficulties and her incision is dry, clean and intact.     Infant:   female  fetus 940 g (2 lb 1.2 oz)  with Apgar scores of 7  , 8   at five minutes.    Condition on Discharge:  Stable    Vital Signs  Temp:  [98.3 °F (36.8 °C)] 98.3 °F (36.8 °C)  Heart Rate:  [53-68] 53  Resp:  [16-18] 16  BP: (122-145)/(75-98) 122/75    Lab Results   Component Value Date    WBC 20.53 (H) 2018    HGB 11.3 (L) 2018    HCT 34.2 (L) 2018    MCV 95.3 2018     2018       Discharge Disposition  Home or Self Care    Discharge Medications   Tatianna Diane   Home Medication Instructions [id number]    Printed on:18 1020   Medication Information                      acetaminophen (TYLENOL) 500 MG tablet  Take 2 tablets by mouth Every 6 (Six) Hours As Needed for Mild Pain .             diphenhydrAMINE (BENADRYL) 25 mg capsule  Take 25 mg by mouth Every 6 (Six) Hours As Needed for Itching.             HYDROcodone-acetaminophen (NORCO) 5-325 MG per tablet  Take 1  tablet by mouth Every 4 (Four) Hours As Needed for Moderate Pain  for up to 7 days.             lisinopril (PRINIVIL,ZESTRIL) 10 MG tablet  Take 1 tablet by mouth Daily.             Prenatal Vit-Fe Fumarate-FA (PRENATAL VITAMIN 27-0.8) 27-0.8 MG tablet tablet  Take  by mouth Daily.             promethazine (PHENERGAN) 25 MG tablet  Take 1 tablet by mouth Every 6 (Six) Hours As Needed for Nausea or Vomiting.                 Discharge Diet: regular    Activity at Discharge:   Activity Instructions     Pelvic Rest           No heavy lifting , driving for 2 weeks. Leave steri-strips on for 10-14 days.    Follow-up Appointments  Future Appointments  Date Time Provider Department Center   4/26/2018 9:00 AM  [facility] DEPT SCHEDULE [facility] None   4/26/2018 9:15 AM [facility]  1  [facility]   4/26/2018 2:20 PM [facility]     Additional Instructions for the Follow-ups that You Need to Schedule     Discharge Follow-up with Specialty: maile; 1 Week    As directed      Specialty:  [facility]    Follow Up:  1 Week    Follow Up Details:  post op / BP check         Discharge Follow-up with Specialty: ma[facility]; 1 Week    As directed      Specialty:  ma[facility]    Follow Up:  1 Week    Follow Up Details:  BP check / post op               Hemanth Nichole MD  04/14/18  10:20 AM  [facility]

## 2018-04-14 NOTE — LACTATION NOTE
Mother continuing to pump for NICU infant, currently getting about 60 ml per session. No questions or concerns at this time.

## 2018-04-14 NOTE — DISCHARGE INSTRUCTIONS
Acetaminophen; Hydrocodone tablets or capsules  What is this medicine?  ACETAMINOPHEN; HYDROCODONE (a set a CARA mariola fen; saturnino droe KOE done) is a pain reliever. It is used to treat moderate to severe pain.  This medicine may be used for other purposes; ask your health care provider or pharmacist if you have questions.  COMMON BRAND NAME(S): Anexsia, Bancap HC, Ceta-Plus, Co-Gesic, Comfortpak, Dolagesic, Dolorex Forte, DuoCet, Hydrocet, Hydrogesic, Lorcet, Lorcet HD, Lorcet Plus, Lortab, Margesic H, Maxidone, Norco, Polygesic, Stagesic, Vanacet, Verdrocet, Vicodin, Vicodin ES, Vicodin HP, Xodol, Zydone  What should I tell my health care provider before I take this medicine?  They need to know if you have any of these conditions:  -brain tumor  -Crohn's disease, inflammatory bowel disease, or ulcerative colitis  -drug abuse or addiction  -head injury  -heart or circulation problems  -if you often drink alcohol  -kidney disease or problems going to the bathroom  -liver disease  -lung disease, asthma, or breathing problems  -an unusual or allergic reaction to acetaminophen, hydrocodone, other opioid analgesics, other medicines, foods, dyes, or preservatives  -pregnant or trying to get pregnant  -breast-feeding  How should I use this medicine?  Take this medicine by mouth with a glass of water. Follow the directions on the prescription label. You can take it with or without food. If it upsets your stomach, take it with food. Do not take your medicine more often than directed.  A special MedGuide will be given to you by the pharmacist with each prescription and refill. Be sure to read this information carefully each time.  Talk to your pediatrician regarding the use of this medicine in children. Special care may be needed.  Overdosage: If you think you have taken too much of this medicine contact a poison control center or emergency room at once.  NOTE: This medicine is only for you. Do not share this medicine with  others.  What if I miss a dose?  If you miss a dose, take it as soon as you can. If it is almost time for your next dose, take only that dose. Do not take double or extra doses.  What may interact with this medicine?  This medicine may interact with the following medications:  -alcohol  -antiviral medicines for HIV or AIDS  -atropine  -antihistamines for allergy, cough and cold  -certain antibiotics like erythromycin, clarithromycin  -certain medicines for anxiety or sleep  -certain medicines for bladder problems like oxybutynin, tolterodine  -certain medicines for depression like amitriptyline, fluoxetine, sertraline  -certain medicines for fungal infections like ketoconazole and itraconazole  -certain medicines for Parkinson's disease like benztropine, trihexyphenidyl  -certain medicines for seizures like carbamazepine, phenobarbital, phenytoin, primidone  -certain medicines for stomach problems like dicyclomine, hyoscyamine  -certain medicines for travel sickness like scopolamine  -general anesthetics like halothane, isoflurane, methoxyflurane, propofol  -ipratropium  -local anesthetics like lidocaine, pramoxine, tetracaine  -MAOIs like Carbex, Eldepryl, Marplan, Nardil, and Parnate  -medicines that relax muscles for surgery  -other medicines with acetaminophen  -other narcotic medicines for pain or cough  -phenothiazines like chlorpromazine, mesoridazine, prochlorperazine, thioridazine  -rifampin  This list may not describe all possible interactions. Give your health care provider a list of all the medicines, herbs, non-prescription drugs, or dietary supplements you use. Also tell them if you smoke, drink alcohol, or use illegal drugs. Some items may interact with your medicine.  What should I watch for while using this medicine?  Tell your doctor or health care professional if your pain does not go away, if it gets worse, or if you have new or a different type of pain. You may develop tolerance to the medicine.  Tolerance means that you will need a higher dose of the medicine for pain relief. Tolerance is normal and is expected if you take the medicine for a long time.  Do not suddenly stop taking your medicine because you may develop a severe reaction. Your body becomes used to the medicine. This does NOT mean you are addicted. Addiction is a behavior related to getting and using a drug for a non-medical reason. If you have pain, you have a medical reason to take pain medicine. Your doctor will tell you how much medicine to take. If your doctor wants you to stop the medicine, the dose will be slowly lowered over time to avoid any side effects.  There are different types of narcotic medicines (opiates). If you take more than one type at the same time or if you are taking another medicine that also causes drowsiness, you may have more side effects. Give your health care provider a list of all medicines you use. Your doctor will tell you how much medicine to take. Do not take more medicine than directed. Call emergency for help if you have problems breathing or unusual sleepiness.  Do not take other medicines that contain acetaminophen with this medicine. Always read labels carefully. If you have questions, ask your doctor or pharmacist.  If you take too much acetaminophen get medical help right away. Too much acetaminophen can be very dangerous and cause liver damage. Even if you do not have symptoms, it is important to get help right away.  You may get drowsy or dizzy. Do not drive, use machinery, or do anything that needs mental alertness until you know how this medicine affects you. Do not stand or sit up quickly, especially if you are an older patient. This reduces the risk of dizzy or fainting spells. Alcohol may interfere with the effect of this medicine. Avoid alcoholic drinks.  The medicine will cause constipation. Try to have a bowel movement at least every 2 to 3 days. If you do not have a bowel movement for 3  days, call your doctor or health care professional.  Your mouth may get dry. Chewing sugarless gum or sucking hard candy, and drinking plenty of water may help. Contact your doctor if the problem does not go away or is severe.  What side effects may I notice from receiving this medicine?  Side effects that you should report to your doctor or health care professional as soon as possible:  -allergic reactions like skin rash, itching or hives, swelling of the face, lips, or tongue  -breathing problems  -confusion  -redness, blistering, peeling or loosening of the skin, including inside the mouth  -signs and symptoms of low blood pressure like dizziness; feeling faint or lightheaded, falls; unusually weak or tired  -trouble passing urine or change in the amount of urine  -yellowing of the eyes or skin  Side effects that usually do not require medical attention (report to your doctor or health care professional if they continue or are bothersome):  -constipation  -dry mouth  -nausea, vomiting  -tiredness  This list may not describe all possible side effects. Call your doctor for medical advice about side effects. You may report side effects to FDA at 7-062-FDA-5616.  Where should I keep my medicine?  Keep out of the reach of children. This medicine can be abused. Keep your medicine in a safe place to protect it from theft. Do not share this medicine with anyone. Selling or giving away this medicine is dangerous and against the law.  This medicine may cause accidental overdose and death if it taken by other adults, children, or pets. Mix any unused medicine with a substance like cat litter or coffee grounds. Then throw the medicine away in a sealed container like a sealed bag or a coffee can with a lid. Do not use the medicine after the expiration date.  Store at room temperature between 15 and 30 degrees C (59 and 86 degrees F).  NOTE: This sheet is a summary. It may not cover all possible information. If you have  questions about this medicine, talk to your doctor, pharmacist, or health care provider.  © 2018 [employer]vier/Gold Standard (2016-09-09 10:02:16)  Lisinopril tablets  What is this medicine?  LISINOPRIL (lyse IN oh pril) is an ACE inhibitor. This medicine is used to treat high blood pressure and heart failure. It is also used to protect the heart immediately after a heart attack.  This medicine may be used for other purposes; ask your health care provider or pharmacist if you have questions.  COMMON BRAND NAME(S): Prinivil, Zestril  What should I tell my health care provider before I take this medicine?  They need to know if you have any of these conditions:  -diabetes  -heart or blood vessel disease  -kidney disease  -low blood pressure  -previous swelling of the tongue, face, or lips with difficulty breathing, difficulty swallowing, hoarseness, or tightening of the throat  -an unusual or allergic reaction to lisinopril, other ACE inhibitors, insect venom, foods, dyes, or preservatives  -pregnant or trying to get pregnant  -breast-feeding  How should I use this medicine?  Take this medicine by mouth with a glass of water. Follow the directions on your prescription label. You may take this medicine with or without food. If it upsets your stomach, take it with food. Take your medicine at regular intervals. Do not take it more often than directed. Do not stop taking except on your doctor's advice.  Talk to your pediatrician regarding the use of this medicine in children. Special care may be needed. While this drug may be prescribed for children as young as 6 years of age for selected conditions, precautions do apply.  Overdosage: If you think you have taken too much of this medicine contact a poison control center or emergency room at once.  NOTE: This medicine is only for you. Do not share this medicine with others.  What if I miss a dose?  If you miss a dose, take it as soon as you can. If it is almost time for your next  dose, take only that dose. Do not take double or extra doses.  What may interact with this medicine?  Do not take this medicine with any of the following medications:  -hymenoptera venom  -sacubitril; valsartan  This medicines may also interact with the following medications:  -aliskiren  -angiotensin receptor blockers, like losartan or valsartan  -certain medicines for diabetes  -diuretics  -everolimus  -gold compounds  -lithium  -NSAIDs, medicines for pain and inflammation, like ibuprofen or naproxen  -potassium salts or supplements  -salt substitutes  -sirolimus  -temsirolimus  This list may not describe all possible interactions. Give your health care provider a list of all the medicines, herbs, non-prescription drugs, or dietary supplements you use. Also tell them if you smoke, drink alcohol, or use illegal drugs. Some items may interact with your medicine.  What should I watch for while using this medicine?  Visit your doctor or health care professional for regular check ups. Check your blood pressure as directed. Ask your doctor what your blood pressure should be, and when you should contact him or her.  Do not treat yourself for coughs, colds, or pain while you are using this medicine without asking your doctor or health care professional for advice. Some ingredients may increase your blood pressure.  Women should inform their doctor if they wish to become pregnant or think they might be pregnant. There is a potential for serious side effects to an unborn child. Talk to your health care professional or pharmacist for more information.  Check with your doctor or health care professional if you get an attack of severe diarrhea, nausea and vomiting, or if you sweat a lot. The loss of too much body fluid can make it dangerous for you to take this medicine.  You may get drowsy or dizzy. Do not drive, use machinery, or do anything that needs mental alertness until you know how this drug affects you. Do not stand  or sit up quickly, especially if you are an older patient. This reduces the risk of dizzy or fainting spells. Alcohol can make you more drowsy and dizzy. Avoid alcoholic drinks.  Avoid salt substitutes unless you are told otherwise by your doctor or health care professional.  What side effects may I notice from receiving this medicine?  Side effects that you should report to your doctor or health care professional as soon as possible:  -allergic reactions like skin rash, itching or hives, swelling of the hands, feet, face, lips, throat, or tongue  -breathing problems  -signs and symptoms of kidney injury like trouble passing urine or change in the amount of urine  -signs and symptoms of increased potassium like muscle weakness; chest pain; or fast, irregular heartbeat  -signs and symptoms of liver injury like dark yellow or brown urine; general ill feeling or flu-like symptoms; light-colored stools; loss of appetite; nausea; right upper belly pain; unusually weak or tired; yellowing of the eyes or skin  -signs and symptoms of low blood pressure like dizziness; feeling faint or lightheaded, falls; unusually weak or tired  -stomach pain with or without nausea and vomiting  Side effects that usually do not require medical attention (report to your doctor or health care professional if they continue or are bothersome):  -changes in taste  -cough  -dizziness  -fever  -headache  -sensitivity to light  This list may not describe all possible side effects. Call your doctor for medical advice about side effects. You may report side effects to FDA at 6-141-FDA-3994.  Where should I keep my medicine?  Keep out of the reach of children.  Store at room temperature between 15 and 30 degrees C (59 and 86 degrees F). Protect from moisture. Keep container tightly closed. Throw away any unused medicine after the expiration date.  NOTE: This sheet is a summary. It may not cover all possible information. If you have questions about  this medicine, talk to your doctor, pharmacist, or health care provider.  © 2018 [employer]vier/Gold Standard (2017-02-06 12:52:35)

## 2018-04-18 ENCOUNTER — OFFICE VISIT (OUTPATIENT)
Dept: OBSTETRICS AND GYNECOLOGY | Facility: CLINIC | Age: 23
End: 2018-04-18

## 2018-04-18 VITALS
DIASTOLIC BLOOD PRESSURE: 84 MMHG | RESPIRATION RATE: 14 BRPM | SYSTOLIC BLOOD PRESSURE: 132 MMHG | WEIGHT: 184 LBS | BODY MASS INDEX: 33.86 KG/M2

## 2018-04-18 DIAGNOSIS — Z98.890 POST-OPERATIVE STATE: Primary | ICD-10-CM

## 2018-04-18 LAB
CYTO UR: NORMAL
LAB AP CASE REPORT: NORMAL
LAB AP CLINICAL INFORMATION: NORMAL
Lab: NORMAL
PATH REPORT.FINAL DX SPEC: NORMAL
PATH REPORT.GROSS SPEC: NORMAL

## 2018-04-18 PROCEDURE — 99024 POSTOP FOLLOW-UP VISIT: CPT | Performed by: OBSTETRICS & GYNECOLOGY

## 2018-04-18 NOTE — PROGRESS NOTES
Subjective   Chief Complaint   Patient presents with   • Blood Pressure Check     Tatianna Diane is a 22 y.o. year old .  Patient's last menstrual period was 2017.  She presents to be seen because of a blood pressure check.  She was discharged from the hospital after a delivery related to severe preeclampsia.    The following portions of the patient's history were reviewed and updated as appropriate:current medications, allergies and past surgical history    Smoking status: Former Smoker                                                              Packs/day: 0.25      Years: 0.00         Types: Cigarettes     Start date:      Quit date: 2017  Smokeless tobacco: Never Used                             Objective   /84   Resp 14   Wt 83.5 kg (184 lb)   LMP 2017   BMI 33.86 kg/m²     Lab Review   No data reviewed    Imaging   No data reviewed        Assessment   1. Postpartum hypertension well controlled with current medication     Plan   1. She was encouraged to continue with the medication until 5 weeks postpartum.  At that point I recommended she discontinue it.  We'll plan to see her back at 6 weeks for routine postpartum checkup along with assessment of blood pressure off medication  2. The importance of keeping all planned follow-up and taking all medications as prescribed was emphasized.  3. Follow up for postpartum visit          This note was electronically signed.    Chacorta Romero M.D.  2018    Total time spent today with Tatianna  was 20 minutes (level 3).  Off this time, 100% was spent face-to-face time coordinating care, answering her questions and counseling regarding pathophysiology of her presenting problem along with plans for any diagnositc work-up and treatment.    Note: Speech recognition transcription software may have been used to create portions of this document.  An attempt at proofreading has been made but errors in transcription could still be  present.

## 2018-04-26 ENCOUNTER — APPOINTMENT (OUTPATIENT)
Dept: WOMENS IMAGING | Facility: HOSPITAL | Age: 23
End: 2018-04-26
Attending: OBSTETRICS & GYNECOLOGY

## 2018-05-24 ENCOUNTER — POSTPARTUM VISIT (OUTPATIENT)
Dept: OBSTETRICS AND GYNECOLOGY | Facility: CLINIC | Age: 23
End: 2018-05-24

## 2018-05-24 VITALS
DIASTOLIC BLOOD PRESSURE: 80 MMHG | SYSTOLIC BLOOD PRESSURE: 120 MMHG | WEIGHT: 181.6 LBS | HEART RATE: 67 BPM | OXYGEN SATURATION: 99 % | BODY MASS INDEX: 33.42 KG/M2 | RESPIRATION RATE: 14 BRPM | HEIGHT: 62 IN

## 2018-05-24 PROBLEM — O14.90 PRE-ECLAMPSIA: Status: RESOLVED | Noted: 2018-04-08 | Resolved: 2018-05-24

## 2018-05-24 PROCEDURE — 0503F POSTPARTUM CARE VISIT: CPT | Performed by: OBSTETRICS & GYNECOLOGY

## 2018-05-24 NOTE — PROGRESS NOTES
"Subjective   Chief Complaint   Patient presents with   • Postpartum Care     No complaints     Tatianna Diane is a 22 y.o. year old  presenting to be seen for her postpartum visit.  She had a .  Her daughter is doing well.    Since delivery she has been sexually active.  She does not have concerns about post-partum blues/depression.   She is breast feeding and plans to continues for 1 year(s).  For ongoing contraception, her plans are undecided.    The following portions of the patient's history were reviewed and updated as appropriate:current medications and allergies    Smoking status: Former Smoker                                                              Packs/day: 0.25      Years: 0.00         Types: Cigarettes     Start date:      Quit date: 2017  Smokeless tobacco: Never Used                          Review of Systems  Constitutional POS: nothing reported    NEG: anorexia or night sweats   Genitourinary POS: nothing reported    NEG: dysuria or hematuria      Gastointestinal POS: nothing reported    NEG: bloating, change in bowel habits, melena or reflux symptoms   Breast POS: nothing reported    NEG: persistent breast lump, skin dimpling or nipple discharge        Objective   /80   Pulse 67   Resp 14   Ht 157 cm (61.81\")   Wt 82.4 kg (181 lb 9.6 oz)   LMP 2017   SpO2 99%   Breastfeeding? Yes Comment: Breast and formula  BMI 33.42 kg/m²     General:  well developed; well nourished  no acute distress   Abdomen: soft, non-tender; no masses  no umbilical or inginual hernias are present  no hepato-splenomegaly  incision is healed   Pelvis: Clinical staff was present for exam  External genitalia:  normal appearance of the external genitalia including [place]th[place]'s and [place]'s glands.  :  urethral meatus normal;  Vaginal:  normal pink mucosa without prolapse or lesions.  Cervix:  normal appearance.  Uterus:  normal size, shape and consistency. anteverted;  Adnexa:  " normal bimanual exam of the adnexa.          Assessment   1. Normal 6 week postpartum exam     Plan   1. BC options reviewed and compared today: Depo-Provera, IUD - Mirena, Nexplanon and OCP (progestin only)  2. The importance of keeping all planned follow-up and taking all medications as prescribed was emphasized.  3. Follow up for IUD placement         This note was electronically signed.    Chacorta Romero M.D.  May 24, 2018    Note: Speech recognition transcription software may have been used to create portions of this document.  An attempt at proofreading has been made but errors in transcription could still be present.

## 2018-05-30 ENCOUNTER — OFFICE VISIT (OUTPATIENT)
Dept: OBSTETRICS AND GYNECOLOGY | Facility: CLINIC | Age: 23
End: 2018-05-30

## 2018-05-30 VITALS
DIASTOLIC BLOOD PRESSURE: 74 MMHG | SYSTOLIC BLOOD PRESSURE: 118 MMHG | BODY MASS INDEX: 34.04 KG/M2 | RESPIRATION RATE: 14 BRPM | WEIGHT: 185 LBS

## 2018-05-30 DIAGNOSIS — Z97.5 CONTRACEPTION, DEVICE INTRAUTERINE: Primary | ICD-10-CM

## 2018-05-30 PROCEDURE — 58300 INSERT INTRAUTERINE DEVICE: CPT | Performed by: OBSTETRICS & GYNECOLOGY

## 2018-06-11 ENCOUNTER — TELEPHONE (OUTPATIENT)
Dept: OBSTETRICS AND GYNECOLOGY | Facility: CLINIC | Age: 23
End: 2018-06-11

## 2018-06-11 NOTE — TELEPHONE ENCOUNTER
Pt of [facility] called stating she had iud inserted 2 weeks ago and is still having dark red bleeding like a period and passing clots and stomoch cramping. Please advise pt on what she needs to do

## 2018-06-11 NOTE — TELEPHONE ENCOUNTER
Would reassure her that this is not uncommon.  Usually gets better with time.  So long as the bleeding is not excessive and she is not having any other symptoms we'll plan on seeing her at 6 weeks as already planned

## 2018-07-02 ENCOUNTER — TELEPHONE (OUTPATIENT)
Dept: OBSTETRICS AND GYNECOLOGY | Facility: CLINIC | Age: 23
End: 2018-07-02

## 2018-07-02 NOTE — TELEPHONE ENCOUNTER
Let her know that low libido and moodiness is not a common side effect related directly to the IUD.  More likely it relates to having a  and sleep deprivation.  The light bleeding is also not uncommon.  When we see her second week of July and have her ultrasound we can look into this further.

## 2018-07-02 NOTE — TELEPHONE ENCOUNTER
Dr Romero    Pt had IUD put in 5 weeks ago and has had light bleeding, no sex drive and mood change.    Pt call back [phone]

## 2018-07-11 ENCOUNTER — OFFICE VISIT (OUTPATIENT)
Dept: OBSTETRICS AND GYNECOLOGY | Facility: CLINIC | Age: 23
End: 2018-07-11

## 2018-07-11 ENCOUNTER — APPOINTMENT (OUTPATIENT)
Dept: LAB | Facility: HOSPITAL | Age: 23
End: 2018-07-11

## 2018-07-11 VITALS — RESPIRATION RATE: 14 BRPM | WEIGHT: 176 LBS | BODY MASS INDEX: 32.39 KG/M2

## 2018-07-11 DIAGNOSIS — Z97.5 CONTRACEPTION, DEVICE INTRAUTERINE: Primary | ICD-10-CM

## 2018-07-11 DIAGNOSIS — F43.9 STRESS: ICD-10-CM

## 2018-07-11 LAB
T4 FREE SERPL-MCNC: 1.7 NG/DL (ref 0.89–1.76)
TSH SERPL DL<=0.05 MIU/L-ACNC: 0.02 MIU/ML (ref 0.35–5.35)

## 2018-07-11 PROCEDURE — 99213 OFFICE O/P EST LOW 20 MIN: CPT | Performed by: OBSTETRICS & GYNECOLOGY

## 2018-07-11 PROCEDURE — 84443 ASSAY THYROID STIM HORMONE: CPT | Performed by: OBSTETRICS & GYNECOLOGY

## 2018-07-11 PROCEDURE — 36415 COLL VENOUS BLD VENIPUNCTURE: CPT | Performed by: OBSTETRICS & GYNECOLOGY

## 2018-07-11 PROCEDURE — 84439 ASSAY OF FREE THYROXINE: CPT | Performed by: OBSTETRICS & GYNECOLOGY

## 2018-07-11 RX ORDER — FLUOXETINE HYDROCHLORIDE 20 MG/1
CAPSULE ORAL
COMMUNITY
Start: 2018-06-07 | End: 2018-11-29 | Stop reason: HOSPADM

## 2018-07-11 NOTE — PROGRESS NOTES
Subjective   Chief Complaint   Patient presents with   • Imaging Only     Tatianna Diane is a 22 y.o. year old  presenting to be seen for follow-up of her recently placed Mirena.  Since the time of insertion she has been sexually active.  [place] has been painful for her.   [place] has not been painful for her partner.    Recently started on SSRI's    OTHER THINGS SHE WANTS TO DISCUSS TODAY:  Nothing else       Objective   Resp 14   Wt 79.8 kg (176 lb)   Breastfeeding? No   BMI 32.39 kg/m²     Imaging   Pelvic ultrasound images independantly reviewed - Normal placement       Assessment   1. Normal placement of IUD  2. Anxiety - new start to SSRI's  3. Deep dyspareunia - may be related to her anxiety     Plan   1. The importance of keeping all planned follow-up and taking all medications as prescribed was emphasized.  2. The following tests were ordered today: TSH and T4 - free.  It was explained to Tatianna that all lab test should be back within the one week after they are performed. She will be notified about the results, regardless of the findings. If she has not been contacted by the office within 2 weeks after the test has been performed, it is her responsibility to contact us to learn about her results.  3. Follow up for annual exam 3 months          Total time spent today with Tatianna  was 20 minutes (level 3).  Greater than 50% of the time was spent coordinating care, answering her questions and counseling regarding pathophysiology of her presenting problem along with plans for any diagnositc work-up and treatment.    This note was electronically signed.    Chacorta Romero M.D.  2018    Note: Speech recognition transcription software may have been used to create portions of this document.  An attempt at proofreading has been made but errors in transcription could still be present.

## 2019-02-06 ENCOUNTER — TELEPHONE (OUTPATIENT)
Dept: OBSTETRICS AND GYNECOLOGY | Facility: CLINIC | Age: 24
End: 2019-02-06

## 2019-02-06 NOTE — TELEPHONE ENCOUNTER
Would let her know that since I have never examined her back nor am I an expert in back diseases such as scoliosis, I do not feel I am qualified to wite such a letter.  If that is what is TRULY required for it to be deemed medically necessary she may need to see an orthopedist or neurosurgeon  that specialized in spine to obtain such an opinion.

## 2019-02-06 NOTE — TELEPHONE ENCOUNTER
maile    Pt is needing a letter stating that due to her scoliosis, a breast reduction would be beneficial to her well being. She doesn't have a primary doctor so the surgeon told her to ask her ob/gyn doctor to write the letter. Letter needs to be faxed to [facility], fax# [phone].     Please let pt know if this cannot be done so she can find her a primary doctor.

## 2019-02-28 PROCEDURE — 87070 CULTURE OTHR SPECIMN AEROBIC: CPT | Performed by: NURSE PRACTITIONER

## 2019-03-02 ENCOUNTER — TELEPHONE (OUTPATIENT)
Dept: URGENT CARE | Facility: CLINIC | Age: 24
End: 2019-03-02

## 2019-03-02 ENCOUNTER — HOSPITAL ENCOUNTER (EMERGENCY)
Facility: HOSPITAL | Age: 24
Discharge: HOME | End: 2019-03-02
Attending: EMERGENCY MEDICINE | Admitting: EMERGENCY MEDICINE

## 2019-03-02 ENCOUNTER — APPOINTMENT (OUTPATIENT)
Dept: ULTRASOUND IMAGING | Facility: HOSPITAL | Age: 24
End: 2019-03-02

## 2019-03-02 VITALS
HEIGHT: 62 IN | WEIGHT: 168 LBS | DIASTOLIC BLOOD PRESSURE: 78 MMHG | BODY MASS INDEX: 30.91 KG/M2 | OXYGEN SATURATION: 99 % | HEART RATE: 78 BPM | TEMPERATURE: 98.2 F | RESPIRATION RATE: 18 BRPM | SYSTOLIC BLOOD PRESSURE: 121 MMHG

## 2019-03-02 DIAGNOSIS — R10.13 EPIGASTRIC PAIN: ICD-10-CM

## 2019-03-02 DIAGNOSIS — K52.9 GASTROENTERITIS: Primary | ICD-10-CM

## 2019-03-02 LAB
ALBUMIN SERPL-MCNC: 4.72 G/DL (ref 3.2–4.8)
ALBUMIN/GLOB SERPL: 2.1 G/DL (ref 1.5–2.5)
ALP SERPL-CCNC: 112 U/L (ref 25–100)
ALT SERPL W P-5'-P-CCNC: 25 U/L (ref 7–40)
ANION GAP SERPL CALCULATED.3IONS-SCNC: 8 MMOL/L (ref 3–11)
AST SERPL-CCNC: 21 U/L (ref 0–33)
BASOPHILS # BLD AUTO: 0.02 10*3/MM3 (ref 0–0.2)
BASOPHILS NFR BLD AUTO: 0.4 % (ref 0–1)
BILIRUB SERPL-MCNC: 0.6 MG/DL (ref 0.3–1.2)
BUN BLD-MCNC: 17 MG/DL (ref 9–23)
BUN/CREAT SERPL: 17.2 (ref 7–25)
CALCIUM SPEC-SCNC: 9 MG/DL (ref 8.7–10.4)
CHLORIDE SERPL-SCNC: 109 MMOL/L (ref 99–109)
CO2 SERPL-SCNC: 25 MMOL/L (ref 20–31)
CREAT BLD-MCNC: 0.99 MG/DL (ref 0.6–1.3)
DEPRECATED RDW RBC AUTO: 39.3 FL (ref 37–54)
EOSINOPHIL # BLD AUTO: 0.04 10*3/MM3 (ref 0–0.3)
EOSINOPHIL NFR BLD AUTO: 0.8 % (ref 0–3)
ERYTHROCYTE [DISTWIDTH] IN BLOOD BY AUTOMATED COUNT: 12.1 % (ref 11.3–14.5)
GFR SERPL CREATININE-BSD FRML MDRD: 68 ML/MIN/1.73
GLOBULIN UR ELPH-MCNC: 2.3 GM/DL
GLUCOSE BLD-MCNC: 86 MG/DL (ref 70–100)
HCT VFR BLD AUTO: 38.3 % (ref 34.5–44)
HGB BLD-MCNC: 13 G/DL (ref 11.5–15.5)
HOLD SPECIMEN: NORMAL
HOLD SPECIMEN: NORMAL
IMM GRANULOCYTES # BLD AUTO: 0 10*3/MM3 (ref 0–0.05)
IMM GRANULOCYTES NFR BLD AUTO: 0 % (ref 0–0.6)
LIPASE SERPL-CCNC: 29 U/L (ref 6–51)
LYMPHOCYTES # BLD AUTO: 1.95 10*3/MM3 (ref 0.6–4.8)
LYMPHOCYTES NFR BLD AUTO: 39.9 % (ref 24–44)
MCH RBC QN AUTO: 30.5 PG (ref 27–31)
MCHC RBC AUTO-ENTMCNC: 33.9 G/DL (ref 32–36)
MCV RBC AUTO: 89.9 FL (ref 80–99)
MONOCYTES # BLD AUTO: 0.57 10*3/MM3 (ref 0–1)
MONOCYTES NFR BLD AUTO: 11.7 % (ref 0–12)
NEUTROPHILS # BLD AUTO: 2.31 10*3/MM3 (ref 1.5–8.3)
NEUTROPHILS NFR BLD AUTO: 47.2 % (ref 41–71)
PLATELET # BLD AUTO: 248 10*3/MM3 (ref 150–450)
PMV BLD AUTO: 10.3 FL (ref 6–12)
POTASSIUM BLD-SCNC: 3.6 MMOL/L (ref 3.5–5.5)
PROT SERPL-MCNC: 7 G/DL (ref 5.7–8.2)
RBC # BLD AUTO: 4.26 10*6/MM3 (ref 3.89–5.14)
SODIUM BLD-SCNC: 142 MMOL/L (ref 132–146)
WBC NRBC COR # BLD: 4.89 10*3/MM3 (ref 3.5–10.8)
WHOLE BLOOD HOLD SPECIMEN: NORMAL
WHOLE BLOOD HOLD SPECIMEN: NORMAL

## 2019-03-02 PROCEDURE — 96374 THER/PROPH/DIAG INJ IV PUSH: CPT

## 2019-03-02 PROCEDURE — 83690 ASSAY OF LIPASE: CPT | Performed by: EMERGENCY MEDICINE

## 2019-03-02 PROCEDURE — 25010000002 ONDANSETRON PER 1 MG: Performed by: EMERGENCY MEDICINE

## 2019-03-02 PROCEDURE — 85025 COMPLETE CBC W/AUTO DIFF WBC: CPT | Performed by: EMERGENCY MEDICINE

## 2019-03-02 PROCEDURE — 96375 TX/PRO/DX INJ NEW DRUG ADDON: CPT

## 2019-03-02 PROCEDURE — 80053 COMPREHEN METABOLIC PANEL: CPT | Performed by: EMERGENCY MEDICINE

## 2019-03-02 PROCEDURE — 25010000002 HYDROMORPHONE PER 4 MG: Performed by: EMERGENCY MEDICINE

## 2019-03-02 PROCEDURE — 76705 ECHO EXAM OF ABDOMEN: CPT

## 2019-03-02 PROCEDURE — 99284 EMERGENCY DEPT VISIT MOD MDM: CPT

## 2019-03-02 RX ORDER — HYDROCODONE BITARTRATE AND ACETAMINOPHEN 5; 325 MG/1; MG/1
1 TABLET ORAL 4 TIMES DAILY PRN
Qty: 12 TABLET | Refills: 0 | Status: SHIPPED | OUTPATIENT
Start: 2019-03-02 | End: 2019-05-06

## 2019-03-02 RX ORDER — SODIUM CHLORIDE 0.9 % (FLUSH) 0.9 %
10 SYRINGE (ML) INJECTION AS NEEDED
Status: DISCONTINUED | OUTPATIENT
Start: 2019-03-02 | End: 2019-03-02 | Stop reason: HOSPADM

## 2019-03-02 RX ORDER — HYDROMORPHONE HYDROCHLORIDE 1 MG/ML
0.5 INJECTION, SOLUTION INTRAMUSCULAR; INTRAVENOUS; SUBCUTANEOUS ONCE
Status: COMPLETED | OUTPATIENT
Start: 2019-03-02 | End: 2019-03-02

## 2019-03-02 RX ORDER — ONDANSETRON 2 MG/ML
4 INJECTION INTRAMUSCULAR; INTRAVENOUS ONCE
Status: COMPLETED | OUTPATIENT
Start: 2019-03-02 | End: 2019-03-02

## 2019-03-02 RX ADMIN — ONDANSETRON 4 MG: 2 INJECTION INTRAMUSCULAR; INTRAVENOUS at 14:44

## 2019-03-02 RX ADMIN — SODIUM CHLORIDE 500 ML: 9 INJECTION, SOLUTION INTRAVENOUS at 14:44

## 2019-03-02 RX ADMIN — HYDROMORPHONE HYDROCHLORIDE 0.5 MG: 1 INJECTION, SOLUTION INTRAMUSCULAR; INTRAVENOUS; SUBCUTANEOUS at 14:44

## 2019-03-02 NOTE — TELEPHONE ENCOUNTER
Pt called [facility] returning vm; informed of negative throat cx. States her throat is feeling better. No questions at this time.

## 2019-03-02 NOTE — ED PROVIDER NOTES
Subjective   Tatianna Diane is a 23 y.o.female who presents to the ED with complaints of abdominal pain. The patient reports having gradually worsening pain in her epigastric region that radiates across her upper abdomen for the past 4 days. She first started noticing the pain worsening two days ago after eating, but she said yesterday it worsened every time after eating. Today, she has had intermittent episodes of pain. When her pain hits, she describes it as a sharp sensation followed by a dull achy pain that lasts for 30-40 seconds. The pain will knock her breath out and she becomes nauseated. No position specifically worsens her pain. She has been having several episodes of diarrhea that is orange and mucous like. She last ate this morning at 1030. She says she feels dehydrated and has tried drinking water. She denies having any chest pain, palpitations, or shortness of breath. She has not taken any antibiotics in the past month. She takes Effexor and Lamictal daily. She has had a Caesarean section and appendectomy. There are no other acute complaints at this time.        History provided by:  Patient  Abdominal Pain   Pain location:  Epigastric  Pain quality: aching, dull and sharp    Pain radiates to:  LUQ and RUQ  Pain severity:  Severe  Onset quality:  Gradual  Duration:  4 days  Timing:  Intermittent  Progression:  Worsening  Chronicity:  New  Relieved by:  Nothing  Worsened by:  Eating  Ineffective treatments:  Position changes  Associated symptoms: diarrhea and nausea    Associated symptoms: no chest pain and no shortness of breath    Risk factors: not elderly        Review of Systems   Respiratory: Negative for shortness of breath.    Cardiovascular: Negative for chest pain and palpitations.   Gastrointestinal: Positive for abdominal pain, diarrhea and nausea.   All other systems reviewed and are negative.      Past Medical History:   Diagnosis Date   • Attention deficit hyperactivity disorder (ADHD),  "combined type 2012    Off meds after 1-2 years   • History of scarlet fever     in childhood   • Hypothyroid 2006    Resolved after ~ 3 years of Rxs   • Migraine     \"When I was younger\"  Last episode was one week ago   • Pelvic adhesive disease 2016   • Scoliosis        No Known Allergies    Past Surgical History:   Procedure Laterality Date   •  SECTION N/A 4/10/2018    Procedure:  SECTION PRIMARY;  Surgeon: Jamal Almeida MD;  [facility]: [facility];  Service: Obstetrics/Gynecology   • LAPAROSCOPIC APPENDECTOMY     • LAPAROSCOPIC LYSIS OF ADHESIONS  2016    Findings consistent with chronic PID   • NASAL SEPTUM SURGERY     • OR CATH/INJECT HYSTEROSALPINGOGRAM  2017     Uterus normal; right adnexa normal; left tube did not demonstrate fill or spill   • TONSILLECTOMY AND ADENOIDECTOMY         Family History   Problem Relation Age of Onset   • Coronary artery disease Paternal Grandmother    • Diabetes Paternal Grandmother    • Hypertension Paternal Grandmother        Social History     Socioeconomic History   • Marital status:      Spouse name: Not on file   • Number of children: Not on file   • Years of education: Not on file   • Highest education level: Not on file   Tobacco Use   • Smoking status: Former Smoker     Packs/day: 0.25     Types: Cigarettes     Start date:      Last attempt to quit: 2017     Years since quittin.8   • Smokeless tobacco: Never Used   Substance and Sexual Activity   • Alcohol use: No   • Drug use: No   • Sexual activity: Yes   Social History Narrative    ** Merged History Encounter **              Objective   Physical Exam   Constitutional: She is oriented to person, place, and time. She appears well-developed and well-nourished. No distress.   HENT:   Head: Normocephalic and atraumatic.   Nose: Nose normal.   Pharynx benign. Airway patent.    Eyes: Conjunctivae are normal. No scleral icterus.   Neck: Normal " range of motion. Neck supple.   Cardiovascular: Normal rate, regular rhythm and normal heart sounds.   No murmur heard.  Pulmonary/Chest: Effort normal and breath sounds normal. No respiratory distress.   Abdominal: Soft. Bowel sounds are normal. There is tenderness in the epigastric area.   Musculoskeletal: Normal range of motion. She exhibits no edema.   Lymphadenopathy:     She has no cervical adenopathy.   Neurological: She is alert and oriented to person, place, and time.   Skin: Skin is warm and dry.   Psychiatric: She has a normal mood and affect. Her behavior is normal.   Nursing note and vitals reviewed.      Procedures         ED Course  ED Course as of Mar 02 1634   Sat Mar 02, 2019   1549 Reevaluated the patient at bedside and updated her on findings and plan for further care.   [TJ]   1603 LOTTIE report was not available.  I believe the medical necessity for and safety in prescribing the controlled substance substantially outweighs the risk of unlawful use or diversion of the controlled substance.    [TJ]      ED Course User Index  [TJ] Dilan Raghav     Recent Results (from the past 24 hour(s))   Comprehensive Metabolic Panel    Collection Time: 03/02/19  2:43 PM   Result Value Ref Range    Glucose 86 70 - 100 mg/dL    BUN 17 9 - 23 mg/dL    Creatinine 0.99 0.60 - 1.30 mg/dL    Sodium 142 132 - 146 mmol/L    Potassium 3.6 3.5 - 5.5 mmol/L    Chloride 109 99 - 109 mmol/L    CO2 25.0 20.0 - 31.0 mmol/L    Calcium 9.0 8.7 - 10.4 mg/dL    Total Protein 7.0 5.7 - 8.2 g/dL    Albumin 4.72 3.20 - 4.80 g/dL    ALT (SGPT) 25 7 - 40 U/L    AST (SGOT) 21 0 - 33 U/L    Alkaline Phosphatase 112 (H) 25 - 100 U/L    Total Bilirubin 0.6 0.3 - 1.2 mg/dL    eGFR Non African Amer 68 >60 mL/min/1.73    Globulin 2.3 gm/dL    A/G Ratio 2.1 1.5 - 2.5 g/dL    BUN/Creatinine Ratio 17.2 7.0 - 25.0    Anion Gap 8.0 3.0 - 11.0 mmol/L   Lipase    Collection Time: 03/02/19  2:43 PM   Result Value Ref Range    Lipase 29 6 - 51 U/L    Light Blue Top    Collection Time: 03/02/19  2:43 PM   Result Value Ref Range    Extra Tube hold for add-on    Green Top (Gel)    Collection Time: 03/02/19  2:43 PM   Result Value Ref Range    Extra Tube Hold for add-ons.    Lavender Top    Collection Time: 03/02/19  2:43 PM   Result Value Ref Range    Extra Tube hold for add-on    Gold Top - SST    Collection Time: 03/02/19  2:43 PM   Result Value Ref Range    Extra Tube Hold for add-ons.    CBC Auto Differential    Collection Time: 03/02/19  2:43 PM   Result Value Ref Range    WBC 4.89 3.50 - 10.80 10*3/mm3    RBC 4.26 3.89 - 5.14 10*6/mm3    Hemoglobin 13.0 11.5 - 15.5 g/dL    Hematocrit 38.3 34.5 - 44.0 %    MCV 89.9 80.0 - 99.0 fL    MCH 30.5 27.0 - 31.0 pg    MCHC 33.9 32.0 - 36.0 g/dL    RDW 12.1 11.3 - 14.5 %    RDW-SD 39.3 37.0 - 54.0 fl    MPV 10.3 6.0 - 12.0 fL    Platelets 248 150 - 450 10*3/mm3    Neutrophil % 47.2 41.0 - 71.0 %    Lymphocyte % 39.9 24.0 - 44.0 %    Monocyte % 11.7 0.0 - 12.0 %    Eosinophil % 0.8 0.0 - 3.0 %    Basophil % 0.4 0.0 - 1.0 %    Immature Grans % 0.0 0.0 - 0.6 %    Neutrophils, Absolute 2.31 1.50 - 8.30 10*3/mm3    Lymphocytes, Absolute 1.95 0.60 - 4.80 10*3/mm3    Monocytes, Absolute 0.57 0.00 - 1.00 10*3/mm3    Eosinophils, Absolute 0.04 0.00 - 0.30 10*3/mm3    Basophils, Absolute 0.02 0.00 - 0.20 10*3/mm3    Immature Grans, Absolute 0.00 0.00 - 0.05 10*3/mm3     Note: In addition to lab results from this visit, the labs listed above may include labs taken at another facility or during a different encounter within the last 24 hours. Please correlate lab times with ED admission and discharge times for further clarification of the services performed during this visit.    US Gallbladder   Preliminary Result   Unremarkable right upper quadrant ultrasound.       DICTATED:   3/2/2019   EDITED/ls :   3/2/2019                  Vitals:    03/02/19 1545 03/02/19 1600 03/02/19 1615 03/02/19 1631   BP: 110/78 115/78 125/80 121/78    BP Location:    Right arm   Patient Position:    Lying   Pulse:    78   Resp:    18   Temp:    98.2 °F (36.8 °C)   TempSrc:    Oral   SpO2: 97% 99% 98% 99%   Weight:       Height:         Medications   sodium chloride 0.9 % flush 10 mL (not administered)   sodium chloride 0.9 % bolus 500 mL (0 mL Intravenous Stopped 3/2/19 1551)   ondansetron (ZOFRAN) injection 4 mg (4 mg Intravenous Given 3/2/19 1444)   HYDROmorphone (DILAUDID) injection 0.5 mg (0.5 mg Intravenous Given 3/2/19 1444)     ECG/EMG Results (last 24 hours)     ** No results found for the last 24 hours. **        No orders to display                       MDM    Final diagnoses:   Gastroenteritis   Epigastric pain       Documentation assistance provided by carina Kong.  Information recorded by the franciscoibmable was done at my direction and has been verified and validated by me.     Raghav Kong  03/02/19 1413       Raghav Kong  03/02/19 1605       Ghulam Chidlers MD  03/02/19 2377

## 2019-03-02 NOTE — DISCHARGE INSTRUCTIONS
Although the pain is unlikely to be from stomach acid problems, try Prilosec, 20 mg daily in case that helps.    Follow up with Geovnai Echevarria as scheduled.     Immediately return to the ED for worsening or new concerning symptoms.

## 2019-05-03 PROBLEM — Z34.80 PRENATAL CARE, SUBSEQUENT PREGNANCY: Status: ACTIVE | Noted: 2019-05-03

## 2019-05-03 PROBLEM — O34.219 PREVIOUS CESAREAN DELIVERY AFFECTING PREGNANCY: Status: ACTIVE | Noted: 2019-05-03

## 2019-05-03 PROBLEM — O09.90 HIGH-RISK PREGNANCY: Status: ACTIVE | Noted: 2019-05-03

## 2019-05-03 PROBLEM — O09.299 HX OF PREECLAMPSIA, PRIOR PREGNANCY, CURRENTLY PREGNANT: Status: ACTIVE | Noted: 2019-05-03

## 2019-05-06 ENCOUNTER — INITIAL PRENATAL (OUTPATIENT)
Dept: OBSTETRICS AND GYNECOLOGY | Facility: CLINIC | Age: 24
End: 2019-05-06

## 2019-05-06 ENCOUNTER — APPOINTMENT (OUTPATIENT)
Dept: LAB | Facility: HOSPITAL | Age: 24
End: 2019-05-06

## 2019-05-06 VITALS — BODY MASS INDEX: 31.46 KG/M2 | SYSTOLIC BLOOD PRESSURE: 116 MMHG | DIASTOLIC BLOOD PRESSURE: 70 MMHG | WEIGHT: 172 LBS

## 2019-05-06 DIAGNOSIS — O09.91 HIGH-RISK PREGNANCY IN FIRST TRIMESTER: Primary | ICD-10-CM

## 2019-05-06 DIAGNOSIS — O34.219 PREVIOUS CESAREAN DELIVERY AFFECTING PREGNANCY: ICD-10-CM

## 2019-05-06 DIAGNOSIS — O09.299 HX OF PREECLAMPSIA, PRIOR PREGNANCY, CURRENTLY PREGNANT: ICD-10-CM

## 2019-05-06 PROBLEM — Z82.79 FAMILY HISTORY OF CONGENITAL HEART DEFECT: Status: ACTIVE | Noted: 2019-05-06

## 2019-05-06 PROBLEM — Z97.5 CONTRACEPTION, DEVICE INTRAUTERINE: Status: RESOLVED | Noted: 2018-05-30 | Resolved: 2019-05-06

## 2019-05-06 LAB
ABO GROUP BLD: NORMAL
AMPHET+METHAMPHET UR QL: NEGATIVE
AMPHETAMINES UR QL: NEGATIVE
BARBITURATES UR QL SCN: NEGATIVE
BASOPHILS # BLD AUTO: 0.03 10*3/MM3 (ref 0–0.2)
BASOPHILS NFR BLD AUTO: 0.3 % (ref 0–1.5)
BENZODIAZ UR QL SCN: NEGATIVE
BLD GP AB SCN SERPL QL: NEGATIVE
BUPRENORPHINE SERPL-MCNC: NEGATIVE NG/ML
CANNABINOIDS SERPL QL: NEGATIVE
COCAINE UR QL: NEGATIVE
DEPRECATED RDW RBC AUTO: 40.3 FL (ref 37–54)
EOSINOPHIL # BLD AUTO: 0.03 10*3/MM3 (ref 0–0.4)
EOSINOPHIL NFR BLD AUTO: 0.3 % (ref 0.3–6.2)
ERYTHROCYTE [DISTWIDTH] IN BLOOD BY AUTOMATED COUNT: 12 % (ref 12.3–15.4)
HBV SURFACE AG SERPL QL IA: NORMAL
HCT VFR BLD AUTO: 40.8 % (ref 34–46.6)
HCV AB SER DONR QL: NORMAL
HGB BLD-MCNC: 13.4 G/DL (ref 12–15.9)
HIV1+2 AB SER QL: NORMAL
IMM GRANULOCYTES # BLD AUTO: 0.02 10*3/MM3 (ref 0–0.05)
IMM GRANULOCYTES NFR BLD AUTO: 0.2 % (ref 0–0.5)
LYMPHOCYTES # BLD AUTO: 2.3 10*3/MM3 (ref 0.7–3.1)
LYMPHOCYTES NFR BLD AUTO: 26.4 % (ref 19.6–45.3)
MCH RBC QN AUTO: 30.2 PG (ref 26.6–33)
MCHC RBC AUTO-ENTMCNC: 32.8 G/DL (ref 31.5–35.7)
MCV RBC AUTO: 92.1 FL (ref 79–97)
METHADONE UR QL SCN: NEGATIVE
MONOCYTES # BLD AUTO: 0.61 10*3/MM3 (ref 0.1–0.9)
MONOCYTES NFR BLD AUTO: 7 % (ref 5–12)
NEUTROPHILS # BLD AUTO: 5.73 10*3/MM3 (ref 1.7–7)
NEUTROPHILS NFR BLD AUTO: 65.8 % (ref 42.7–76)
NRBC BLD AUTO-RTO: 0 /100 WBC (ref 0–0.2)
OPIATES UR QL: NEGATIVE
OXYCODONE UR QL SCN: NEGATIVE
PCP UR QL SCN: NEGATIVE
PLATELET # BLD AUTO: 262 10*3/MM3 (ref 140–450)
PMV BLD AUTO: 11.6 FL (ref 6–12)
PROPOXYPH UR QL: NEGATIVE
RBC # BLD AUTO: 4.43 10*6/MM3 (ref 3.77–5.28)
RH BLD: POSITIVE
TRICYCLICS UR QL SCN: NEGATIVE
WBC NRBC COR # BLD: 8.72 10*3/MM3 (ref 3.4–10.8)

## 2019-05-06 PROCEDURE — G0432 EIA HIV-1/HIV-2 SCREEN: HCPCS | Performed by: OBSTETRICS & GYNECOLOGY

## 2019-05-06 PROCEDURE — 80306 DRUG TEST PRSMV INSTRMNT: CPT | Performed by: OBSTETRICS & GYNECOLOGY

## 2019-05-06 PROCEDURE — 87340 HEPATITIS B SURFACE AG IA: CPT | Performed by: OBSTETRICS & GYNECOLOGY

## 2019-05-06 PROCEDURE — 86901 BLOOD TYPING SEROLOGIC RH(D): CPT | Performed by: OBSTETRICS & GYNECOLOGY

## 2019-05-06 PROCEDURE — 0501F PRENATAL FLOW SHEET: CPT | Performed by: OBSTETRICS & GYNECOLOGY

## 2019-05-06 PROCEDURE — 86803 HEPATITIS C AB TEST: CPT | Performed by: OBSTETRICS & GYNECOLOGY

## 2019-05-06 PROCEDURE — 36415 COLL VENOUS BLD VENIPUNCTURE: CPT | Performed by: OBSTETRICS & GYNECOLOGY

## 2019-05-06 PROCEDURE — 85025 COMPLETE CBC W/AUTO DIFF WBC: CPT | Performed by: OBSTETRICS & GYNECOLOGY

## 2019-05-06 PROCEDURE — 80081 OBSTETRIC PANEL INC HIV TSTG: CPT | Performed by: OBSTETRICS & GYNECOLOGY

## 2019-05-06 PROCEDURE — 86900 BLOOD TYPING SEROLOGIC ABO: CPT | Performed by: OBSTETRICS & GYNECOLOGY

## 2019-05-06 PROCEDURE — 87086 URINE CULTURE/COLONY COUNT: CPT | Performed by: OBSTETRICS & GYNECOLOGY

## 2019-05-06 PROCEDURE — 86850 RBC ANTIBODY SCREEN: CPT | Performed by: OBSTETRICS & GYNECOLOGY

## 2019-05-06 RX ORDER — VENLAFAXINE HYDROCHLORIDE 75 MG/1
CAPSULE, EXTENDED RELEASE ORAL
COMMUNITY
Start: 2019-03-27 | End: 2019-05-06

## 2019-05-06 NOTE — PROGRESS NOTES
Subjective   Chief Complaint   Patient presents with   • Initial Prenatal Visit       Tatianna Diane is a 23 y.o. year old .  Patient's last menstrual period was 2019.  She presents to be seen to initiate prenatal care.     Social History    Tobacco Use      Smoking status: Former Smoker        Packs/day: 0.25        Types: Cigarettes        Start date:         Quit date: 2017        Years since quittin.0      Smokeless tobacco: Never Used      The following portions of the patient's history were reviewed and updated as appropriate:vital signs, allergies, current medications, past medical history, past social history, past surgical history and problem list.    Objective   Lab Review   No data reviewed    Imaging   Pelvic ultrasound report    Assessment/Plan   ASSESSMENT  1. 23 y.o. year old  at 6w5d  2. Supervision of high risk pregnancy  3. Previous C/S with route of delivery to be determined  4. h/o PIH    PLAN  1. The problem list for pregnancy was initiated today  2. Tests ordered today:  Orders Placed This Encounter   Procedures   • Urine Culture - Urine, Urine, Clean Catch   • HIV-1 / O / 2 Ag / Antibody 4th Generation   • Obstetric Panel   • Urine Drug Screen - Urine, Clean Catch     Please confirm all positive UDS     3. Testing for GC / Chlamydia / trichomonas will need to be done at her next prenatal visit  4. Genetic testing reviewed: MSAFP-4 and NIPT  5. Baby aspirin to be initiated at the completion of the first trimester  6. Information reviewed: exercise in pregnancy, nutrition in pregnancy, weight gain in pregnancy, work and travel restrictions during pregnancy, list of OTC medications acceptable in pregnancy and call coverage groups    Total time spent today with Tatianna  was 20 minutes (level 3).  Off this time, 90% was spent face-to-face time coordinating care, answering her questions and counseling regarding pathophysiology of her presenting problem along with plans  for any diagnositc work-up and treatment.    Follow up: 6 week(s)       This note was electronically signed.    Chacorta Romero MD  May 6, 2019

## 2019-05-07 ENCOUNTER — TELEPHONE (OUTPATIENT)
Dept: OBSTETRICS AND GYNECOLOGY | Facility: CLINIC | Age: 24
End: 2019-05-07

## 2019-05-07 LAB
RPR SER QL: NORMAL
RUBV IGG SERPL IA-ACNC: POSITIVE

## 2019-05-08 LAB — BACTERIA SPEC AEROBE CULT: NO GROWTH

## 2019-05-09 NOTE — TELEPHONE ENCOUNTER
Dr Romero    Pt calling and would like Phenergan called in for her nausea.    Pt call back [phone]    [facility] on [address] [phone]

## 2019-05-10 RX ORDER — PROMETHAZINE HYDROCHLORIDE 12.5 MG/1
12.5 TABLET ORAL EVERY 6 HOURS PRN
Qty: 30 TABLET | Refills: 0 | Status: SHIPPED | OUTPATIENT
Start: 2019-05-10 | End: 2019-07-18

## 2019-06-11 ENCOUNTER — TELEPHONE (OUTPATIENT)
Dept: OBSTETRICS AND GYNECOLOGY | Facility: CLINIC | Age: 24
End: 2019-06-11

## 2019-06-11 NOTE — TELEPHONE ENCOUNTER
Dr Romero    Pt calling and is almost 12 weeks and [place] and is calling since she has been vomiting since 9 this morning with dry heaves now. Pt states she is also light headed when she moves with cramping.    Pt call back [phone]

## 2019-06-17 ENCOUNTER — ROUTINE PRENATAL (OUTPATIENT)
Dept: OBSTETRICS AND GYNECOLOGY | Facility: CLINIC | Age: 24
End: 2019-06-17

## 2019-06-17 VITALS — BODY MASS INDEX: 32.19 KG/M2 | DIASTOLIC BLOOD PRESSURE: 68 MMHG | SYSTOLIC BLOOD PRESSURE: 110 MMHG | WEIGHT: 176 LBS

## 2019-06-17 DIAGNOSIS — Z82.79 FAMILY HISTORY OF CONGENITAL HEART DEFECT: ICD-10-CM

## 2019-06-17 DIAGNOSIS — O34.219 PREVIOUS CESAREAN DELIVERY AFFECTING PREGNANCY: ICD-10-CM

## 2019-06-17 DIAGNOSIS — O09.299 HX OF PREECLAMPSIA, PRIOR PREGNANCY, CURRENTLY PREGNANT: ICD-10-CM

## 2019-06-17 DIAGNOSIS — O09.91 HIGH-RISK PREGNANCY IN FIRST TRIMESTER: Primary | ICD-10-CM

## 2019-06-17 LAB
2ND TRIMESTER 4 SCREEN SERPL-IMP: NORMAL
C TRACH RRNA SPEC DONR QL NAA+PROBE: NEGATIVE
EXTERNAL NIPT: NORMAL
N GONORRHOEA DNA SPEC QL NAA+PROBE: NEGATIVE

## 2019-06-17 PROCEDURE — 0502F SUBSEQUENT PRENATAL CARE: CPT | Performed by: OBSTETRICS & GYNECOLOGY

## 2019-06-17 NOTE — PROGRESS NOTES
Chief Complaint   Patient presents with   • Routine Prenatal Visit       HPI: Tatianna is a  currently at 12w5d who today reports the following:  Nausea - improved as compared to the prior visit; Vaginal bleeding -  No; Heartburn - No.    ROS:  GI: Constipation - No; Diarrhea - No    Neuro: Headache - No; Visual change - No      EXAM:  Vitals: See prenatal flowsheet   Abdomen: See prenatal flowsheet   Urine glucose/protein: See prenatal flowsheet   Pelvic: See prenatal flowsheet     Prenatal Labs  Lab Results   Component Value Date    HGB 13.4 2019    RUBELLAABIGG Positive 2019    HEPBSAG Non-Reactive 2019    ABSCRN Negative 2019    TOL7WOD6 Non-Reactive 2019    HEPCVIRUSABY Non-Reactive 2019    GCT 86 2018    URINECX No growth 2019    CHLAMNAA negative 2017    NGONORRHON negative 2017       MDM:  Impression: 1. Supervision of high risk pregnancy   2. Prior PIH   Tests done today: 1. PAP  2. GC/Chlamydia testing   Topics discussed: 1. Continue with PNV's  2. Prenatal labs reviewed  3. Start ASA 81 mg   Tests scheduled today for her next visit:   none

## 2019-06-19 ENCOUNTER — DOCUMENTATION (OUTPATIENT)
Dept: OBSTETRICS AND GYNECOLOGY | Facility: CLINIC | Age: 24
End: 2019-06-19

## 2019-06-24 ENCOUNTER — TELEPHONE (OUTPATIENT)
Dept: OBSTETRICS AND GYNECOLOGY | Facility: CLINIC | Age: 24
End: 2019-06-24

## 2019-06-24 NOTE — TELEPHONE ENCOUNTER
YOUKILIS    A REP FROM [facility] CALLED TO SAY PT ENROLLED IN THE MATERNITY SUPPORT PROGRAM THEY OFFER.     [facility] P# [phone]

## 2019-07-02 ENCOUNTER — HOSPITAL ENCOUNTER (EMERGENCY)
Facility: HOSPITAL | Age: 24
Discharge: HOME | End: 2019-07-02
Attending: EMERGENCY MEDICINE | Admitting: EMERGENCY MEDICINE

## 2019-07-02 ENCOUNTER — APPOINTMENT (OUTPATIENT)
Dept: ULTRASOUND IMAGING | Facility: HOSPITAL | Age: 24
End: 2019-07-02

## 2019-07-02 VITALS
SYSTOLIC BLOOD PRESSURE: 107 MMHG | HEIGHT: 62 IN | OXYGEN SATURATION: 98 % | WEIGHT: 172 LBS | DIASTOLIC BLOOD PRESSURE: 69 MMHG | TEMPERATURE: 98.2 F | RESPIRATION RATE: 18 BRPM | HEART RATE: 66 BPM | BODY MASS INDEX: 31.65 KG/M2

## 2019-07-02 DIAGNOSIS — R10.9 ABDOMINAL PAIN DURING PREGNANCY IN SECOND TRIMESTER: Primary | ICD-10-CM

## 2019-07-02 DIAGNOSIS — O26.892 ABDOMINAL PAIN DURING PREGNANCY IN SECOND TRIMESTER: Primary | ICD-10-CM

## 2019-07-02 LAB
ALBUMIN SERPL-MCNC: 3.8 G/DL (ref 3.5–5.2)
ALBUMIN/GLOB SERPL: 1.6 G/DL
ALP SERPL-CCNC: 54 U/L (ref 39–117)
ALT SERPL W P-5'-P-CCNC: 11 U/L (ref 1–33)
ANION GAP SERPL CALCULATED.3IONS-SCNC: 14 MMOL/L (ref 5–15)
AST SERPL-CCNC: 11 U/L (ref 1–32)
BACTERIA UR QL AUTO: ABNORMAL /HPF
BASOPHILS # BLD AUTO: 0.02 10*3/MM3 (ref 0–0.2)
BASOPHILS NFR BLD AUTO: 0.2 % (ref 0–1.5)
BILIRUB SERPL-MCNC: 0.3 MG/DL (ref 0.2–1.2)
BILIRUB UR QL STRIP: NEGATIVE
BUN BLD-MCNC: 9 MG/DL (ref 6–20)
BUN/CREAT SERPL: 13.8 (ref 7–25)
CALCIUM SPEC-SCNC: 9.2 MG/DL (ref 8.6–10.5)
CHLORIDE SERPL-SCNC: 101 MMOL/L (ref 98–107)
CLARITY UR: CLEAR
CO2 SERPL-SCNC: 24 MMOL/L (ref 22–29)
COLOR UR: YELLOW
CREAT BLD-MCNC: 0.65 MG/DL (ref 0.57–1)
DEPRECATED RDW RBC AUTO: 39.3 FL (ref 37–54)
EOSINOPHIL # BLD AUTO: 0.09 10*3/MM3 (ref 0–0.4)
EOSINOPHIL NFR BLD AUTO: 1.1 % (ref 0.3–6.2)
ERYTHROCYTE [DISTWIDTH] IN BLOOD BY AUTOMATED COUNT: 12 % (ref 12.3–15.4)
GFR SERPL CREATININE-BSD FRML MDRD: 111 ML/MIN/1.73
GLOBULIN UR ELPH-MCNC: 2.4 GM/DL
GLUCOSE BLD-MCNC: 85 MG/DL (ref 65–99)
GLUCOSE UR STRIP-MCNC: NEGATIVE MG/DL
HCG INTACT+B SERPL-ACNC: NORMAL MIU/ML
HCT VFR BLD AUTO: 33.4 % (ref 34–46.6)
HGB BLD-MCNC: 11.2 G/DL (ref 12–15.9)
HGB UR QL STRIP.AUTO: NEGATIVE
HYALINE CASTS UR QL AUTO: ABNORMAL /LPF
IMM GRANULOCYTES # BLD AUTO: 0.02 10*3/MM3 (ref 0–0.05)
IMM GRANULOCYTES NFR BLD AUTO: 0.2 % (ref 0–0.5)
KETONES UR QL STRIP: NEGATIVE
LEUKOCYTE ESTERASE UR QL STRIP.AUTO: ABNORMAL
LYMPHOCYTES # BLD AUTO: 2.59 10*3/MM3 (ref 0.7–3.1)
LYMPHOCYTES NFR BLD AUTO: 30.4 % (ref 19.6–45.3)
MCH RBC QN AUTO: 30.4 PG (ref 26.6–33)
MCHC RBC AUTO-ENTMCNC: 33.5 G/DL (ref 31.5–35.7)
MCV RBC AUTO: 90.5 FL (ref 79–97)
MONOCYTES # BLD AUTO: 0.62 10*3/MM3 (ref 0.1–0.9)
MONOCYTES NFR BLD AUTO: 7.3 % (ref 5–12)
NEUTROPHILS # BLD AUTO: 5.19 10*3/MM3 (ref 1.7–7)
NEUTROPHILS NFR BLD AUTO: 60.8 % (ref 42.7–76)
NITRITE UR QL STRIP: NEGATIVE
NRBC BLD AUTO-RTO: 0 /100 WBC (ref 0–0.2)
PH UR STRIP.AUTO: 6 [PH] (ref 5–8)
PLATELET # BLD AUTO: 204 10*3/MM3 (ref 140–450)
PMV BLD AUTO: 11.1 FL (ref 6–12)
POTASSIUM BLD-SCNC: 3.8 MMOL/L (ref 3.5–5.2)
PROT SERPL-MCNC: 6.2 G/DL (ref 6–8.5)
PROT UR QL STRIP: NEGATIVE
RBC # BLD AUTO: 3.69 10*6/MM3 (ref 3.77–5.28)
RBC # UR: ABNORMAL /HPF
REF LAB TEST METHOD: ABNORMAL
SODIUM BLD-SCNC: 139 MMOL/L (ref 136–145)
SP GR UR STRIP: 1.01 (ref 1–1.03)
SQUAMOUS #/AREA URNS HPF: ABNORMAL /HPF
UROBILINOGEN UR QL STRIP: ABNORMAL
WBC NRBC COR # BLD: 8.53 10*3/MM3 (ref 3.4–10.8)
WBC UR QL AUTO: ABNORMAL /HPF

## 2019-07-02 PROCEDURE — 81001 URINALYSIS AUTO W/SCOPE: CPT | Performed by: NURSE PRACTITIONER

## 2019-07-02 PROCEDURE — 85025 COMPLETE CBC W/AUTO DIFF WBC: CPT | Performed by: NURSE PRACTITIONER

## 2019-07-02 PROCEDURE — 76815 OB US LIMITED FETUS(S): CPT

## 2019-07-02 PROCEDURE — 84702 CHORIONIC GONADOTROPIN TEST: CPT | Performed by: NURSE PRACTITIONER

## 2019-07-02 PROCEDURE — 99284 EMERGENCY DEPT VISIT MOD MDM: CPT

## 2019-07-02 PROCEDURE — 80053 COMPREHEN METABOLIC PANEL: CPT | Performed by: NURSE PRACTITIONER

## 2019-07-02 NOTE — ED PROVIDER NOTES
Subjective   Ms. Tatianna Diane is a 23 y.o. female who is 15 weeks pregnant and presents to the ED with complaints of abdominal pain. She reports that yesterday she developed a constant generalized dull and achy abdominal pain as well as an intermittent tight abdominal pain to her sides, which prompted presentation to the ED. She notes that when she has the tight abdominal pain it causes shortness of breath. She also complains of nausea and vomiting during her whole pregnancy as well as recent urinary frequency. However, she denies any urinary urgency, dysuria, vaginal bleeding, and vaginal discharge. She has a history of pre-eclampsia and miscarriage. Her OBGYN is Dr. Romero. No other acute complaints at this time.     G/P/A : 3/1/1        History provided by:  Patient  Pregnancy Problem   The current episode started yesterday. The problem has been unchanged. The problem occurs constantly. Episode is moderate. Gestational age is 15 weeks.   Primary symptoms include abdominal pain. Patient reports no vaginal bleeding and no vaginal discharge.   Associated symptoms include nausea and vomiting.   Associated symptoms include no dysuria.       Review of Systems   Gastrointestinal: Positive for abdominal pain, nausea and vomiting.   Genitourinary: Positive for frequency. Negative for dysuria, urgency, vaginal bleeding and vaginal discharge.   All other systems reviewed and are negative.      Past Medical History:   Diagnosis Date   • Attention deficit hyperactivity disorder (ADHD), combined type     Off meds after 1-2 years   • History of scarlet fever     in childhood   • Hypothyroid     Resolved after ~ 3 years of Rxs   • Mirena 2018    Placed on 2018 - came out with tampon   • Pelvic adhesive disease 2016   • Postpartum thyroiditis 2018       No Known Allergies    Past Surgical History:   Procedure Laterality Date   •  SECTION N/A 4/10/2018    Procedure: Primary LTCS (1 layer  closure)  Surgeon: Jamal Almeida MD;     • LAPAROSCOPIC APPENDECTOMY     • LAPAROSCOPIC LYSIS OF ADHESIONS  2016    Findings consistent with chronic PID   • NASAL SEPTUM SURGERY     • [place] CATH/INJECT HYSTEROSALPINGOGRAM  2017     Uterus normal; right adnexa normal; left tube did not demonstrate fill or spill   • TONSILLECTOMY AND ADENOIDECTOMY         Family History   Problem Relation Age of Onset   • Coronary artery disease Paternal Grandmother    • Diabetes Paternal Grandmother    • Hypertension Paternal Grandmother        Social History     Socioeconomic History   • Marital status:      Spouse name: Not on file   • Number of children: Not on file   • Years of education: Not on file   • Highest education level: Not on file   Tobacco Use   • Smoking status: Former Smoker     Packs/day: 0.25     Types: Cigarettes     Start date:      Last attempt to quit: 2017     Years since quittin.1   • Smokeless tobacco: Never Used   Substance and Sexual Activity   • Alcohol use: No   • Drug use: No   • Sexual activity: Yes   Social History Narrative    ** Merged History Encounter **              Objective   Physical Exam   Constitutional: She is oriented to person, place, and time. She appears well-developed and well-nourished. She is cooperative.  Non-toxic appearance. No distress.   HENT:   Head: Normocephalic and atraumatic.   Nose: Nose normal.   Mouth/Throat: Oropharynx is clear and moist. No oropharyngeal exudate.   Eyes: Conjunctivae, EOM and lids are normal. Pupils are equal, round, and reactive to light.   Neck: Trachea normal, normal range of motion and full passive range of motion without pain.   Cardiovascular: Regular rhythm, normal heart sounds, intact distal pulses and normal pulses.   Pulmonary/Chest: Effort normal and breath sounds normal. No respiratory distress. She has no decreased breath sounds. She has no rhonchi.   Abdominal: Soft. Normal appearance and  bowel sounds are normal. She exhibits no distension. There is tenderness (mild diffuse abd tenderness).   Gravid abdomen   Musculoskeletal: Normal range of motion.   Neurological: She is alert and oriented to person, place, and time. She has normal strength. No cranial nerve deficit.   Skin: Skin is warm, dry and intact. No rash noted.   Psychiatric: She has a normal mood and affect. Her speech is normal and behavior is normal.   Nursing note and vitals reviewed.      Procedures         ED Course  ED Course as of Jul 02 0504 Tue Jul 02, 2019   0311 HCG Quantitative: 23,567.00 [KG]   0311 Hemoglobin: (!) 11.2 [KG]   0311 Hematocrit: (!) 33.4 [KG]   0314 Patient is advised the results at this time.  Patient's ultrasound shows a single intrauterine pregnancy at 14 weeks 6 days fetal heart rate is 149.  Patient will be discharged home.  Patient to follow-up with her OB/GYN.  Patient to return to the ED as needed.  Patient agrees and verbalized understanding.  [KG]      ED Course User Index  [KG] Yesy Ellis, JULIO CÉSAR       Recent Results (from the past 24 hour(s))   Comprehensive Metabolic Panel    Collection Time: 07/02/19  1:20 AM   Result Value Ref Range    Glucose 85 65 - 99 mg/dL    BUN 9 6 - 20 mg/dL    Creatinine 0.65 0.57 - 1.00 mg/dL    Sodium 139 136 - 145 mmol/L    Potassium 3.8 3.5 - 5.2 mmol/L    Chloride 101 98 - 107 mmol/L    CO2 24.0 22.0 - 29.0 mmol/L    Calcium 9.2 8.6 - 10.5 mg/dL    Total Protein 6.2 6.0 - 8.5 g/dL    Albumin 3.80 3.50 - 5.20 g/dL    ALT (SGPT) 11 1 - 33 U/L    AST (SGOT) 11 1 - 32 U/L    Alkaline Phosphatase 54 39 - 117 U/L    Total Bilirubin 0.3 0.2 - 1.2 mg/dL    eGFR Non African Amer 111 >60 mL/min/1.73    Globulin 2.4 gm/dL    A/G Ratio 1.6 g/dL    BUN/Creatinine Ratio 13.8 7.0 - 25.0    Anion Gap 14.0 5.0 - 15.0 mmol/L   hCG, Quantitative, Pregnancy    Collection Time: 07/02/19  1:20 AM   Result Value Ref Range    HCG Quantitative 23,567.00 mIU/mL   CBC Auto Differential     Collection Time: 07/02/19  1:20 AM   Result Value Ref Range    WBC 8.53 3.40 - 10.80 10*3/mm3    RBC 3.69 (L) 3.77 - 5.28 10*6/mm3    Hemoglobin 11.2 (L) 12.0 - 15.9 g/dL    Hematocrit 33.4 (L) 34.0 - 46.6 %    MCV 90.5 79.0 - 97.0 fL    MCH 30.4 26.6 - 33.0 pg    MCHC 33.5 31.5 - 35.7 g/dL    RDW 12.0 (L) 12.3 - 15.4 %    RDW-SD 39.3 37.0 - 54.0 fl    MPV 11.1 6.0 - 12.0 fL    Platelets 204 140 - 450 10*3/mm3    Neutrophil % 60.8 42.7 - 76.0 %    Lymphocyte % 30.4 19.6 - 45.3 %    Monocyte % 7.3 5.0 - 12.0 %    Eosinophil % 1.1 0.3 - 6.2 %    Basophil % 0.2 0.0 - 1.5 %    Immature Grans % 0.2 0.0 - 0.5 %    Neutrophils, Absolute 5.19 1.70 - 7.00 10*3/mm3    Lymphocytes, Absolute 2.59 0.70 - 3.10 10*3/mm3    Monocytes, Absolute 0.62 0.10 - 0.90 10*3/mm3    Eosinophils, Absolute 0.09 0.00 - 0.40 10*3/mm3    Basophils, Absolute 0.02 0.00 - 0.20 10*3/mm3    Immature Grans, Absolute 0.02 0.00 - 0.05 10*3/mm3    nRBC 0.0 0.0 - 0.2 /100 WBC   Urinalysis With Microscopic If Indicated (No Culture) - Urine, Clean Catch    Collection Time: 07/02/19  1:32 AM   Result Value Ref Range    Color, UA Yellow Yellow, Straw    Appearance, UA Clear Clear    pH, UA 6.0 5.0 - 8.0    Specific Gravity, UA 1.011 1.001 - 1.030    Glucose, UA Negative Negative    Ketones, UA Negative Negative    Bilirubin, UA Negative Negative    Blood, UA Negative Negative    Protein, UA Negative Negative    Leuk Esterase, UA Small (1+) (A) Negative    Nitrite, UA Negative Negative    Urobilinogen, UA 0.2 E.U./dL 0.2 - 1.0 E.U./dL   Urinalysis, Microscopic Only - Urine, Clean Catch    Collection Time: 07/02/19  1:32 AM   Result Value Ref Range    RBC, UA 0-2 None Seen, 0-2 /HPF    WBC, UA 6-12 (A) None Seen, 0-2 /HPF    Bacteria, UA None Seen None Seen, Trace /HPF    Squamous Epithelial Cells, UA 0-2 None Seen, 0-2 /HPF    Hyaline Casts, UA 0-6 0 - 6 /LPF    Methodology Automated Microscopy      Note: In addition to lab results from this visit, the labs  listed above may include labs taken at another facility or during a different encounter within the last 24 hours. Please correlate lab times with ED admission and discharge times for further clarification of the services performed during this visit.    US Ob Limited 1 + Fetuses   Final Result   Single intrauterine pregnancy at 14 weeks 6 days with an EDC of 12/25/2019.      Signer Name: GIOVANNI Conner MD    Signed: 7/2/2019 3:05 AM    Workstation Name: [id number]            Vitals:    07/02/19 0200 07/02/19 0253 07/02/19 0300 07/02/19 0321   BP: 102/70  107/69    BP Location:       Patient Position:       Pulse: 71 71  66   Resp:       Temp:       TempSrc:       SpO2: 100% 98%  98%   Weight:       Height:         Medications - No data to display  ECG/EMG Results (last 24 hours)     ** No results found for the last 24 hours. **        No orders to display         Recent Results (from the past 24 hour(s))   Comprehensive Metabolic Panel    Collection Time: 07/02/19  1:20 AM   Result Value Ref Range    Glucose 85 65 - 99 mg/dL    BUN 9 6 - 20 mg/dL    Creatinine 0.65 0.57 - 1.00 mg/dL    Sodium 139 136 - 145 mmol/L    Potassium 3.8 3.5 - 5.2 mmol/L    Chloride 101 98 - 107 mmol/L    CO2 24.0 22.0 - 29.0 mmol/L    Calcium 9.2 8.6 - 10.5 mg/dL    Total Protein 6.2 6.0 - 8.5 g/dL    Albumin 3.80 3.50 - 5.20 g/dL    ALT (SGPT) 11 1 - 33 U/L    AST (SGOT) 11 1 - 32 U/L    Alkaline Phosphatase 54 39 - 117 U/L    Total Bilirubin 0.3 0.2 - 1.2 mg/dL    eGFR Non African Amer 111 >60 mL/min/1.73    Globulin 2.4 gm/dL    A/G Ratio 1.6 g/dL    BUN/Creatinine Ratio 13.8 7.0 - 25.0    Anion Gap 14.0 5.0 - 15.0 mmol/L   hCG, Quantitative, Pregnancy    Collection Time: 07/02/19  1:20 AM   Result Value Ref Range    HCG Quantitative 23,567.00 mIU/mL   CBC Auto Differential    Collection Time: 07/02/19  1:20 AM   Result Value Ref Range    WBC 8.53 3.40 - 10.80 10*3/mm3    RBC 3.69 (L) 3.77 - 5.28 10*6/mm3    Hemoglobin 11.2 (L) 12.0  - 15.9 g/dL    Hematocrit 33.4 (L) 34.0 - 46.6 %    MCV 90.5 79.0 - 97.0 fL    MCH 30.4 26.6 - 33.0 pg    MCHC 33.5 31.5 - 35.7 g/dL    RDW 12.0 (L) 12.3 - 15.4 %    RDW-SD 39.3 37.0 - 54.0 fl    MPV 11.1 6.0 - 12.0 fL    Platelets 204 140 - 450 10*3/mm3    Neutrophil % 60.8 42.7 - 76.0 %    Lymphocyte % 30.4 19.6 - 45.3 %    Monocyte % 7.3 5.0 - 12.0 %    Eosinophil % 1.1 0.3 - 6.2 %    Basophil % 0.2 0.0 - 1.5 %    Immature Grans % 0.2 0.0 - 0.5 %    Neutrophils, Absolute 5.19 1.70 - 7.00 10*3/mm3    Lymphocytes, Absolute 2.59 0.70 - 3.10 10*3/mm3    Monocytes, Absolute 0.62 0.10 - 0.90 10*3/mm3    Eosinophils, Absolute 0.09 0.00 - 0.40 10*3/mm3    Basophils, Absolute 0.02 0.00 - 0.20 10*3/mm3    Immature Grans, Absolute 0.02 0.00 - 0.05 10*3/mm3    nRBC 0.0 0.0 - 0.2 /100 WBC   Urinalysis With Microscopic If Indicated (No Culture) - Urine, Clean Catch    Collection Time: 07/02/19  1:32 AM   Result Value Ref Range    Color, UA Yellow Yellow, Straw    Appearance, UA Clear Clear    pH, UA 6.0 5.0 - 8.0    Specific Gravity, UA 1.011 1.001 - 1.030    Glucose, UA Negative Negative    Ketones, UA Negative Negative    Bilirubin, UA Negative Negative    Blood, UA Negative Negative    Protein, UA Negative Negative    Leuk Esterase, UA Small (1+) (A) Negative    Nitrite, UA Negative Negative    Urobilinogen, UA 0.2 E.U./dL 0.2 - 1.0 E.U./dL   Urinalysis, Microscopic Only - Urine, Clean Catch    Collection Time: 07/02/19  1:32 AM   Result Value Ref Range    RBC, UA 0-2 None Seen, 0-2 /HPF    WBC, UA 6-12 (A) None Seen, 0-2 /HPF    Bacteria, UA None Seen None Seen, Trace /HPF    Squamous Epithelial Cells, UA 0-2 None Seen, 0-2 /HPF    Hyaline Casts, UA 0-6 0 - 6 /LPF    Methodology Automated Microscopy      Note: In addition to lab results from this visit, the labs listed above may include labs taken at another facility or during a different encounter within the last 24 hours. Please correlate lab times with ED admission and  discharge times for further clarification of the services performed during this visit.    US Ob Limited 1 + Fetuses   Final Result   Single intrauterine pregnancy at 14 weeks 6 days with an EDC of 12/25/2019.      Signer Name: GIOVANNI Conner MD    Signed: 7/2/2019 3:05 AM    Workstation Name: [id number]            Vitals:    07/02/19 0200 07/02/19 0253 07/02/19 0300 07/02/19 0321   BP: 102/70  107/69    BP Location:       Patient Position:       Pulse: 71 71  66   Resp:       Temp:       TempSrc:       SpO2: 100% 98%  98%   Weight:       Height:         Medications - No data to display  ECG/EMG Results (last 24 hours)     ** No results found for the last 24 hours. **        No orders to display                   MDM    Final diagnoses:   Abdominal pain during pregnancy in second trimester       Documentation assistance provided by carina Ruff.  Information recorded by the scribe was done at my direction and has been verified and validated by me.     Maryam Ruff  07/02/19 0055       Yesy Ellis APRN  07/02/19 0504

## 2019-07-18 ENCOUNTER — ROUTINE PRENATAL (OUTPATIENT)
Dept: OBSTETRICS AND GYNECOLOGY | Facility: CLINIC | Age: 24
End: 2019-07-18

## 2019-07-18 VITALS — WEIGHT: 177 LBS | SYSTOLIC BLOOD PRESSURE: 114 MMHG | BODY MASS INDEX: 32.37 KG/M2 | DIASTOLIC BLOOD PRESSURE: 72 MMHG

## 2019-07-18 DIAGNOSIS — O09.299 HX OF PREECLAMPSIA, PRIOR PREGNANCY, CURRENTLY PREGNANT: ICD-10-CM

## 2019-07-18 DIAGNOSIS — O09.92 HIGH-RISK PREGNANCY IN SECOND TRIMESTER: Primary | ICD-10-CM

## 2019-07-18 DIAGNOSIS — O34.219 PREVIOUS CESAREAN DELIVERY AFFECTING PREGNANCY: ICD-10-CM

## 2019-07-18 DIAGNOSIS — Z82.79 FAMILY HISTORY OF CONGENITAL HEART DEFECT: ICD-10-CM

## 2019-07-18 PROCEDURE — 0502F SUBSEQUENT PRENATAL CARE: CPT | Performed by: OBSTETRICS & GYNECOLOGY

## 2019-07-18 NOTE — PROGRESS NOTES
Chief Complaint   Patient presents with   • Routine Prenatal Visit       HPI: Tatianna is a  currently at 17w1d who today reports the following:  Contractions - No; Leaking - No; Vaginal bleeding -  No; Heartburn - No.    ROS:  GI: Nausea - No ; Constipation - No; Diarrhea - No    Neuro: Headache - No ; Visual change - No      EXAM:  Vitals: See prenatal flowsheet   Abdomen: See prenatal flowsheet   Urine glucose/protein: See prenatal flowsheet   Pelvic: See prenatal flowsheet     Lab Results   Component Value Date    ABO O 2019    RH Positive 2019    ABSCRN Negative 2019       MDM:  Impression: 1. Supervision of high risk pregnancy  2. Previous C/S with route of delivery to be determined  3. FH of CHD   Tests done today: 1. none   Topics discussed: 1. Continue with PNV's  2. Prenatal labs reviewed  3. none - she had no major complaints,questions or concerns   Tests scheduled today for her next visit:   U/S for screening and echo at [facility]

## 2019-08-04 ENCOUNTER — HOSPITAL ENCOUNTER (EMERGENCY)
Facility: HOSPITAL | Age: 24
Discharge: HOME | End: 2019-08-05
Attending: EMERGENCY MEDICINE | Admitting: EMERGENCY MEDICINE

## 2019-08-04 VITALS
SYSTOLIC BLOOD PRESSURE: 112 MMHG | TEMPERATURE: 98.6 F | RESPIRATION RATE: 18 BRPM | WEIGHT: 178 LBS | HEIGHT: 62 IN | DIASTOLIC BLOOD PRESSURE: 69 MMHG | BODY MASS INDEX: 32.76 KG/M2 | OXYGEN SATURATION: 97 % | HEART RATE: 77 BPM

## 2019-08-04 DIAGNOSIS — L08.9 INFECTED INSECT BITES OF MULTIPLE SITES: Primary | ICD-10-CM

## 2019-08-04 DIAGNOSIS — T07.XXXA INFECTED INSECT BITES OF MULTIPLE SITES: Primary | ICD-10-CM
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
## 2019-08-04 DIAGNOSIS — Z3A.19 19 WEEKS GESTATION OF PREGNANCY: ICD-10-CM

## 2019-08-04 PROCEDURE — 99283 EMERGENCY DEPT VISIT LOW MDM: CPT

## 2019-08-04 RX ORDER — PRENATAL WITH FERROUS FUM AND FOLIC ACID 3080; 920; 120; 400; 22; 1.84; 3; 20; 10; 1; 12; 200; 27; 25; 2 [IU]/1; [IU]/1; MG/1; [IU]/1; MG/1; MG/1; MG/1; MG/1; MG/1; MG/1; UG/1; MG/1; MG/1; MG/1; MG/1
1 TABLET ORAL DAILY
COMMUNITY

## 2019-08-04 RX ORDER — CEPHALEXIN 500 MG/1
500 CAPSULE ORAL 4 TIMES DAILY
Qty: 28 CAPSULE | Refills: 0 | Status: SHIPPED | OUTPATIENT
Start: 2019-08-04 | End: 2019-08-08

## 2019-08-05 NOTE — ED PROVIDER NOTES
Subjective   Ms. Tatianna Daine is a 23 y.o. female who presents to the ED with c/o insect bites. She reports yesterday she first noted small bite marks on her left forearm which she believes to be spider bites. She reports she has observed worsening redness, induration, and pain in the area. She has noted some drainage from the areas. She is currently 20 weeks pregnant and Dr. Romero is her OBGYN. There are no other acute symptoms at this time.          History provided by:  Patient  Insect Bite   Contact animal:  Unable to specify  Location:  Shoulder/arm  Shoulder/arm injury location:  L forearm  Time since incident:  1 day  Pain details:     Severity:  Moderate    Timing:  Constant    Progression:  Worsening  Incident location:  Unable to specify  Relieved by:  None tried  Worsened by:  Nothing  Ineffective treatments:  None tried  Associated symptoms: no fever        Review of Systems   Constitutional: Negative for fever.   Gastrointestinal: Negative for abdominal pain.   Genitourinary: Negative for vaginal bleeding.   Skin:        Insect bites on left forearm   All other systems reviewed and are negative.      Past Medical History:   Diagnosis Date   • Attention deficit hyperactivity disorder (ADHD), combined type     Off meds after 1-2 years   • History of scarlet fever     in childhood   • Hypothyroid 2006    Resolved after ~ 3 years of Rxs   • Mirena 2018    Placed on 2018 - came out with tampon   • Pelvic adhesive disease 2016   • Postpartum thyroiditis 2018       No Known Allergies    Past Surgical History:   Procedure Laterality Date   •  SECTION N/A 4/10/2018    Procedure: Primary LTCS (1 layer closure)  Surgeon: Jamal Almeida MD;     • LAPAROSCOPIC APPENDECTOMY     • LAPAROSCOPIC LYSIS OF ADHESIONS  2016    Findings consistent with chronic PID   • NASAL SEPTUM SURGERY     • [place] CATH/INJECT HYSTEROSALPINGOGRAM  2017     Uterus normal; right  adnexa normal; left tube did not demonstrate fill or spill   • TONSILLECTOMY AND ADENOIDECTOMY  2003       Family History   Problem Relation Age of Onset   • Coronary artery disease Paternal Grandmother    • Diabetes Paternal Grandmother    • Hypertension Paternal Grandmother        Social History     Socioeconomic History   • Marital status:      Spouse name: Not on file   • Number of children: Not on file   • Years of education: Not on file   • Highest education level: Not on file   Tobacco Use   • Smoking status: Former Smoker     Packs/day: 0.25     Types: Cigarettes     Start date:      Last attempt to quit: 2017     Years since quittin.2   • Smokeless tobacco: Never Used   Substance and Sexual Activity   • Alcohol use: No   • Drug use: No   • Sexual activity: Yes   Social History Narrative    ** Merged History Encounter **              Objective   Physical Exam   Constitutional: She is oriented to person, place, and time. She appears well-developed and well-nourished. She is cooperative.  Non-toxic appearance. No distress.   HENT:   Head: Normocephalic and atraumatic.   Eyes: Conjunctivae, EOM and lids are normal.   Neck: Trachea normal, normal range of motion and full passive range of motion without pain.   Cardiovascular: Regular rhythm, normal heart sounds, intact distal pulses and normal pulses.   Pulmonary/Chest: Breath sounds normal. She has no decreased breath sounds. She has no rhonchi.   Abdominal: Soft. Normal appearance and bowel sounds are normal. She exhibits no distension. There is no tenderness.   Musculoskeletal: Normal range of motion.   Neurological: She is alert and oriented to person, place, and time. She has normal strength. No cranial nerve deficit.   Skin: Skin is warm, dry and intact.   Lt forearm: 2 insect bites to anterior lt forearm.  Sites are erythematous, raised.  No drainage.  No palpable fluctuance.   Psychiatric: She has a normal mood and affect. Her speech is  "normal and behavior is normal.   Nursing note and vitals reviewed.      Procedures         ED Course  ED Course as of Aug 05 0054   Sun Aug 04, 2019   2351 FHT- 144.  [KG]   2355 Ptwill be discharged home with a prescription for Keflex.  Patient to apply warm compresses to the site.  Patient to follow-up with PCP, Dr. Solis as needed.  Patient to return to the ED as needed.  Patient agrees and verbalized understanding.  [KG]      ED Course User Index  [KG] Yesy Ellis APRN     No results found for this or any previous visit (from the past 24 hour(s)).  Note: In addition to lab results from this visit, the labs listed above may include labs taken at another facility or during a different encounter within the last 24 hours. Please correlate lab times with ED admission and discharge times for further clarification of the services performed during this visit.    No orders to display     Vitals:    08/04/19 2249   BP: 112/69   BP Location: Left arm   Patient Position: Sitting   Pulse: 77   Resp: 18   Temp: 98.6 °F (37 °C)   TempSrc: Oral   SpO2: 97%   Weight: 80.7 kg (178 lb)   Height: 157.5 cm (62\")     Medications - No data to display  ECG/EMG Results (last 24 hours)     ** No results found for the last 24 hours. **        No orders to display                       MDM    Final diagnoses:   Infected insect bites of multiple sites   19 weeks gestation of pregnancy       Documentation assistance provided by carina Art.  Information recorded by the carina was done at my direction and has been verified and validated by me.     Bridger Art  08/04/19 [id number]       Yesy Ellis APRN  08/05/19 0054    "

## 2019-08-08 ENCOUNTER — ROUTINE PRENATAL (OUTPATIENT)
Dept: OBSTETRICS AND GYNECOLOGY | Facility: CLINIC | Age: 24
End: 2019-08-08

## 2019-08-08 ENCOUNTER — OFFICE VISIT (OUTPATIENT)
Dept: OBSTETRICS AND GYNECOLOGY | Facility: HOSPITAL | Age: 24
End: 2019-08-08

## 2019-08-08 ENCOUNTER — HOSPITAL ENCOUNTER (OUTPATIENT)
Dept: WOMENS IMAGING | Facility: HOSPITAL | Age: 24
Discharge: HOME | End: 2019-08-08
Admitting: OBSTETRICS & GYNECOLOGY

## 2019-08-08 VITALS — BODY MASS INDEX: 33.22 KG/M2 | WEIGHT: 181.6 LBS | DIASTOLIC BLOOD PRESSURE: 71 MMHG | SYSTOLIC BLOOD PRESSURE: 126 MMHG

## 2019-08-08 DIAGNOSIS — O34.219 PREVIOUS CESAREAN DELIVERY AFFECTING PREGNANCY: ICD-10-CM

## 2019-08-08 DIAGNOSIS — O09.299 HX OF PREECLAMPSIA, PRIOR PREGNANCY, CURRENTLY PREGNANT: ICD-10-CM

## 2019-08-08 DIAGNOSIS — O09.92 HIGH-RISK PREGNANCY IN SECOND TRIMESTER: ICD-10-CM

## 2019-08-08 DIAGNOSIS — Z82.79 FAMILY HISTORY OF CONGENITAL HEART DEFECT: ICD-10-CM

## 2019-08-08 DIAGNOSIS — Z82.79 FAMILY HISTORY OF CONGENITAL HEART DEFECT: Primary | ICD-10-CM

## 2019-08-08 DIAGNOSIS — O09.92 HIGH-RISK PREGNANCY IN SECOND TRIMESTER: Primary | ICD-10-CM

## 2019-08-08 DIAGNOSIS — Z34.90 PREGNANCY, UNSPECIFIED GESTATIONAL AGE: ICD-10-CM

## 2019-08-08 PROCEDURE — 0502F SUBSEQUENT PRENATAL CARE: CPT | Performed by: OBSTETRICS & GYNECOLOGY

## 2019-08-08 PROCEDURE — 76811 OB US DETAILED SNGL FETUS: CPT | Performed by: OBSTETRICS & GYNECOLOGY

## 2019-08-08 PROCEDURE — 76811 OB US DETAILED SNGL FETUS: CPT

## 2019-08-08 NOTE — PROGRESS NOTES
Chief Complaint   Patient presents with   • Routine Prenatal Visit        HPI: Tatianna is a  currently at 20w1d who today reports the following:  Contractions - No; Leaking - No; Vaginal bleeding -  No; Heartburn - No.    ROS:  GI: Nausea - No ; Constipation - No; Diarrhea - No    Neuro: Headache - No ; Visual change - No      EXAM:  Vitals: See prenatal flowsheet   Abdomen: See prenatal flowsheet   Urine glucose/protein: See prenatal flowsheet   Pelvic: See prenatal flowsheet     Lab Results   Component Value Date    ABO O 2019    RH Positive 2019    ABSCRN Negative 2019       MDM:  Impression: 1. Supervision of high risk pregnancy  2. Previous C/S with route of delivery to be determined  3. h/o PIH - on baby ASA   4. Prior CHD   Tests done today: 1. U/S for anatomic screening and echo - anatomy was not completely seen today   Topics discussed: 1. Continue with PNV's  2. Prenatal labs reviewed  3. none - she had no major complaints,questions or concerns   Tests scheduled today for her next visit:   U/S for echo at [facility]

## 2019-08-14 ENCOUNTER — HOSPITAL ENCOUNTER (EMERGENCY)
Facility: HOSPITAL | Age: 24
Discharge: HOME | End: 2019-08-15
Attending: EMERGENCY MEDICINE | Admitting: EMERGENCY MEDICINE

## 2019-08-14 ENCOUNTER — HOSPITAL ENCOUNTER (OUTPATIENT)
Facility: HOSPITAL | Age: 24
Discharge: HOME | End: 2019-08-14
Attending: OBSTETRICS & GYNECOLOGY | Admitting: OBSTETRICS & GYNECOLOGY

## 2019-08-14 VITALS
OXYGEN SATURATION: 98 % | TEMPERATURE: 98.7 F | HEART RATE: 71 BPM | SYSTOLIC BLOOD PRESSURE: 122 MMHG | RESPIRATION RATE: 20 BRPM | DIASTOLIC BLOOD PRESSURE: 88 MMHG

## 2019-08-14 DIAGNOSIS — Z3A.21 21 WEEKS GESTATION OF PREGNANCY: Primary | ICD-10-CM

## 2019-08-14 DIAGNOSIS — R06.02 SHORTNESS OF BREATH: ICD-10-CM

## 2019-08-14 DIAGNOSIS — R73.9 BLOOD GLUCOSE ELEVATED: ICD-10-CM

## 2019-08-14 LAB
ALBUMIN SERPL-MCNC: 4.1 G/DL (ref 3.5–5.2)
ALBUMIN/GLOB SERPL: 1.2 G/DL
ALP SERPL-CCNC: 77 U/L (ref 39–117)
ALT SERPL W P-5'-P-CCNC: 10 U/L (ref 1–33)
ANION GAP SERPL CALCULATED.3IONS-SCNC: 12 MMOL/L (ref 5–15)
AST SERPL-CCNC: 14 U/L (ref 1–32)
BASOPHILS # BLD AUTO: 0.05 10*3/MM3 (ref 0–0.2)
BASOPHILS NFR BLD AUTO: 0.5 % (ref 0–1.5)
BILIRUB SERPL-MCNC: 0.3 MG/DL (ref 0.2–1.2)
BUN BLD-MCNC: 8 MG/DL (ref 6–20)
BUN/CREAT SERPL: 11.6 (ref 7–25)
CALCIUM SPEC-SCNC: 9.7 MG/DL (ref 8.6–10.5)
CHLORIDE SERPL-SCNC: 102 MMOL/L (ref 98–107)
CO2 SERPL-SCNC: 23 MMOL/L (ref 22–29)
CREAT BLD-MCNC: 0.69 MG/DL (ref 0.57–1)
DEPRECATED RDW RBC AUTO: 43.5 FL (ref 37–54)
EOSINOPHIL # BLD AUTO: 0.07 10*3/MM3 (ref 0–0.4)
EOSINOPHIL NFR BLD AUTO: 0.7 % (ref 0.3–6.2)
ERYTHROCYTE [DISTWIDTH] IN BLOOD BY AUTOMATED COUNT: 12.8 % (ref 12.3–15.4)
GFR SERPL CREATININE-BSD FRML MDRD: 104 ML/MIN/1.73
GLOBULIN UR ELPH-MCNC: 3.3 GM/DL
GLUCOSE BLD-MCNC: 143 MG/DL (ref 65–99)
HCT VFR BLD AUTO: 37.9 % (ref 34–46.6)
HGB BLD-MCNC: 12.4 G/DL (ref 12–15.9)
IMM GRANULOCYTES # BLD AUTO: 0.05 10*3/MM3 (ref 0–0.05)
IMM GRANULOCYTES NFR BLD AUTO: 0.5 % (ref 0–0.5)
LIPASE SERPL-CCNC: 17 U/L (ref 13–60)
LYMPHOCYTES # BLD AUTO: 3.02 10*3/MM3 (ref 0.7–3.1)
LYMPHOCYTES NFR BLD AUTO: 29.4 % (ref 19.6–45.3)
MCH RBC QN AUTO: 30.6 PG (ref 26.6–33)
MCHC RBC AUTO-ENTMCNC: 32.7 G/DL (ref 31.5–35.7)
MCV RBC AUTO: 93.6 FL (ref 79–97)
MONOCYTES # BLD AUTO: 0.77 10*3/MM3 (ref 0.1–0.9)
MONOCYTES NFR BLD AUTO: 7.5 % (ref 5–12)
NEUTROPHILS # BLD AUTO: 6.32 10*3/MM3 (ref 1.7–7)
NEUTROPHILS NFR BLD AUTO: 61.4 % (ref 42.7–76)
NRBC BLD AUTO-RTO: 0 /100 WBC (ref 0–0.2)
PLATELET # BLD AUTO: 221 10*3/MM3 (ref 140–450)
PMV BLD AUTO: 11.5 FL (ref 6–12)
POTASSIUM BLD-SCNC: 3.9 MMOL/L (ref 3.5–5.2)
PROT SERPL-MCNC: 7.4 G/DL (ref 6–8.5)
RBC # BLD AUTO: 4.05 10*6/MM3 (ref 3.77–5.28)
SODIUM BLD-SCNC: 137 MMOL/L (ref 136–145)
WBC NRBC COR # BLD: 10.28 10*3/MM3 (ref 3.4–10.8)

## 2019-08-14 PROCEDURE — 82247 BILIRUBIN TOTAL: CPT | Performed by: EMERGENCY MEDICINE

## 2019-08-14 PROCEDURE — 84075 ASSAY ALKALINE PHOSPHATASE: CPT | Performed by: EMERGENCY MEDICINE

## 2019-08-14 PROCEDURE — 85730 THROMBOPLASTIN TIME PARTIAL: CPT | Performed by: EMERGENCY MEDICINE

## 2019-08-14 PROCEDURE — 85610 PROTHROMBIN TIME: CPT | Performed by: EMERGENCY MEDICINE

## 2019-08-14 PROCEDURE — 85379 FIBRIN DEGRADATION QUANT: CPT | Performed by: EMERGENCY MEDICINE

## 2019-08-14 PROCEDURE — 93005 ELECTROCARDIOGRAM TRACING: CPT | Performed by: EMERGENCY MEDICINE

## 2019-08-14 PROCEDURE — 84550 ASSAY OF BLOOD/URIC ACID: CPT | Performed by: EMERGENCY MEDICINE

## 2019-08-14 PROCEDURE — 83615 LACTATE (LD) (LDH) ENZYME: CPT | Performed by: EMERGENCY MEDICINE

## 2019-08-14 PROCEDURE — 99284 EMERGENCY DEPT VISIT MOD MDM: CPT

## 2019-08-14 PROCEDURE — 82565 ASSAY OF CREATININE: CPT | Performed by: EMERGENCY MEDICINE

## 2019-08-14 PROCEDURE — 80053 COMPREHEN METABOLIC PANEL: CPT | Performed by: EMERGENCY MEDICINE

## 2019-08-14 PROCEDURE — G0463 HOSPITAL OUTPT CLINIC VISIT: HCPCS

## 2019-08-14 PROCEDURE — 84460 ALANINE AMINO (ALT) (SGPT): CPT | Performed by: EMERGENCY MEDICINE

## 2019-08-14 PROCEDURE — 83690 ASSAY OF LIPASE: CPT

## 2019-08-14 PROCEDURE — 85025 COMPLETE CBC W/AUTO DIFF WBC: CPT

## 2019-08-14 PROCEDURE — 84450 TRANSFERASE (AST) (SGOT): CPT | Performed by: EMERGENCY MEDICINE

## 2019-08-14 RX ORDER — SODIUM CHLORIDE 0.9 % (FLUSH) 0.9 %
10 SYRINGE (ML) INJECTION AS NEEDED
Status: DISCONTINUED | OUTPATIENT
Start: 2019-08-14 | End: 2019-08-15 | Stop reason: HOSPADM

## 2019-08-15 ENCOUNTER — APPOINTMENT (OUTPATIENT)
Dept: CT IMAGING | Facility: HOSPITAL | Age: 24
End: 2019-08-15

## 2019-08-15 ENCOUNTER — TELEPHONE (OUTPATIENT)
Dept: OBSTETRICS AND GYNECOLOGY | Facility: CLINIC | Age: 24
End: 2019-08-15

## 2019-08-15 VITALS
RESPIRATION RATE: 18 BRPM | DIASTOLIC BLOOD PRESSURE: 63 MMHG | HEART RATE: 96 BPM | WEIGHT: 182 LBS | SYSTOLIC BLOOD PRESSURE: 105 MMHG | TEMPERATURE: 98.9 F | BODY MASS INDEX: 33.49 KG/M2 | HEIGHT: 62 IN | OXYGEN SATURATION: 96 %

## 2019-08-15 PROBLEM — R73.9 HYPERGLYCEMIA: Status: ACTIVE | Noted: 2019-08-15

## 2019-08-15 LAB
ALP SERPL-CCNC: 75 U/L (ref 39–117)
ALT SERPL W P-5'-P-CCNC: 9 U/L (ref 1–33)
APTT PPP: 34.2 SECONDS (ref 24–37)
AST SERPL-CCNC: 13 U/L (ref 1–32)
BILIRUB SERPL-MCNC: 0.3 MG/DL (ref 0.2–1.2)
CREAT BLD-MCNC: 0.7 MG/DL (ref 0.57–1)
D DIMER PPP FEU-MCNC: 0.69 MCGFEU/ML (ref 0–0.56)
HOLD SPECIMEN: NORMAL
HOLD SPECIMEN: NORMAL
INR PPP: 0.97 (ref 0.85–1.16)
LDH SERPL-CCNC: 176 U/L (ref 135–214)
PROTHROMBIN TIME: 12.4 SECONDS (ref 11.2–14.3)
URATE SERPL-MCNC: 2.8 MG/DL (ref 2.4–5.7)
WHOLE BLOOD HOLD SPECIMEN: NORMAL
WHOLE BLOOD HOLD SPECIMEN: NORMAL

## 2019-08-15 PROCEDURE — 71275 CT ANGIOGRAPHY CHEST: CPT

## 2019-08-15 PROCEDURE — 0 IOPAMIDOL PER 1 ML: Performed by: EMERGENCY MEDICINE

## 2019-08-15 RX ADMIN — IOPAMIDOL 85 ML: 755 INJECTION, SOLUTION INTRAVENOUS at 02:21

## 2019-08-15 NOTE — DISCHARGE INSTRUCTIONS
Rest and continue your medications and plan of care as per Dr. Romero    Stay very well-hydrated    Follow-up with Dr. Romero in the office this week.  Call in the morning after 830 for an appointment    Return to the ED at once if you have any acute, urgent, emergent, or progressive symptoms as discussed especially any pregnancy related symptoms

## 2019-08-15 NOTE — ED PROVIDER NOTES
Subjective   Ms. Tatianna Daine is a 23 y.o. female who is 21 weeks pregnant and presents to the ED with complaints of chest pain. She reports that she has had a headache, nausea, facial swelling, and arm swelling for the past 2 days that has worsened in the past day so she spoke to her OBGYN, Dr. Romero, who suggested she come into the ED for evaluation. After being triaged in the ED she was sent up to labor and delivery but she then developed chest pain so she was sent back to the ED. She notes that she has a history of pre-eclampsia with her past pregnancy and her diastolic blood pressure has been in the 90s all day. She denies any fever, chills, cough, congestion, runny nose, sore throat, vomiting, diarrhea, leg swelling, or vaginal bleeding. She has no history of blood clots.     G/P/A : 3/1/1        History provided by:  Patient  Pregnancy Problem   The current episode started today. Episode is mild. Gestational age is 21 weeks. Patient reports no vaginal bleeding. Primary symptoms: Chest pain. Prenatal care has been regular.   Associated symptoms include headaches and nausea.   Associated symptoms include no fever and no vomiting.   Prior pregnancy complications include history of pre-eclampsia.       Review of Systems   Constitutional: Negative for chills and fever.   HENT: Positive for facial swelling. Negative for congestion, rhinorrhea and sore throat.    Respiratory: Negative for cough.    Cardiovascular: Negative for leg swelling.   Gastrointestinal: Positive for nausea. Negative for diarrhea and vomiting.   Genitourinary: Negative for vaginal bleeding.   Neurological: Positive for headaches.   All other systems reviewed and are negative.      Past Medical History:   Diagnosis Date   • Attention deficit hyperactivity disorder (ADHD), combined type 2012    Off meds after 1-2 years   • History of scarlet fever     in childhood   • Hypothyroid 2006    Resolved after ~ 3 years of Rxs   • Migraine      as achild   • Mirena 2018    Placed on 2018 - came out with tampon   • Pelvic adhesive disease 2016   • Postpartum thyroiditis 2018   • Preeclampsia        No Known Allergies    Past Surgical History:   Procedure Laterality Date   • APPENDECTOMY     •  SECTION N/A 4/10/2018    Procedure: Primary LTCS (1 layer closure)  Surgeon: Jamal Almeida MD;     • LAPAROSCOPIC APPENDECTOMY     • LAPAROSCOPIC LYSIS OF ADHESIONS  2016    Findings consistent with chronic PID   • NASAL SEPTUM SURGERY     • [place] CATH/INJECT HYSTEROSALPINGOGRAM  2017     Uterus normal; right adnexa normal; left tube did not demonstrate fill or spill   • TONSILLECTOMY     • TONSILLECTOMY AND ADENOIDECTOMY         Family History   Problem Relation Age of Onset   • Coronary artery disease Paternal Grandmother    • Diabetes Paternal Grandmother    • Hypertension Paternal Grandmother        Social History     Socioeconomic History   • Marital status:      Spouse name: Not on file   • Number of children: Not on file   • Years of education: Not on file   • Highest education level: Not on file   Tobacco Use   • Smoking status: Former Smoker     Packs/day: 0.25     Types: Cigarettes     Start date:      Last attempt to quit: 2017     Years since quittin.2   • Smokeless tobacco: Never Used   Substance and Sexual Activity   • Alcohol use: No   • Drug use: No   • Sexual activity: Yes   Social History Narrative    ** Merged History Encounter **              Objective   Physical Exam   Constitutional: She is oriented to person, place, and time. She appears well-developed and well-nourished. No distress.   HENT:   Head: Normocephalic and atraumatic.   Nose: Nose normal.   Eyes: Conjunctivae are normal. No scleral icterus.   Neck: Normal range of motion. Neck supple.   Cardiovascular: Normal rate, regular rhythm and normal heart sounds.   No murmur heard.  Pulmonary/Chest: Effort normal and  breath sounds normal. No respiratory distress.   Abdominal: Soft. There is no tenderness.   Musculoskeletal: Normal range of motion.   No signs of stigmata or DVT.    Neurological: She is alert and oriented to person, place, and time.   Skin: Skin is warm and dry. She is not diaphoretic.   Psychiatric: She has a normal mood and affect. Her behavior is normal.   Nursing note and vitals reviewed.      Procedures         ED Course  ED Course as of Aug 15 0256   Wed Aug 14, 2019   2355 I discussed findings with the patient.  Initial laboratory evaluation is unremarkable.  Patient is afebrile.  Sats are 98 to 99% without tachypnea.  She has no stigmata of DVT.  She has no pleuritic pain.  Examination is otherwise unremarkableThe patient does report having swelling at her upper extremities and face today, though I cannot appreciate this on examination.  Additional laboratory evaluation is pending.  EKG is nonsignificantly changed actually slightly improved from previously.  Patient agrees with plan of care  [HH]   Thu Aug 15, 2019   0050 Findings and evaluation today with Dr. Aguayo.  Discussed laboratory evaluation.  We will proceed with CT of the chest in view of the elevated d-dimer and the patient's symptomatology and pregnancy  [HH]   0050 I discussed CT of the chest with the patient including risk benefits and options.  She is in agreement with proceeding with CTA  [HH]   0246 Patient is serially reevaluated throughout the ED course with last reevaluation now.  Sats remain excellent throughout the ED course.  She had no overt tachypnea.  Evaluation is otherwise unrewarding though her glucose was mildly elevated 143CT examination the chest with contrast PE protocol is negative.I discussed the findings at length with this very pleasant patient.  I discussed follow-up with Dr. Solis in the office and will have her call him tomorrow.  We discussed indications for immediate returnVery pleasant responsible patient  verbalized understanding agreement with plan of care, need for follow-up, and indications for immediate return.  We will have him follow-up on her glucose of 143 today  [HH]      ED Course User Index  [HH] Gabriel Campo MD     Recent Results (from the past 24 hour(s))   Lipase    Collection Time: 08/14/19 11:06 PM   Result Value Ref Range    Lipase 17 13 - 60 U/L   Light Blue Top    Collection Time: 08/14/19 11:06 PM   Result Value Ref Range    Extra Tube hold for add-on    Green Top (Gel)    Collection Time: 08/14/19 11:06 PM   Result Value Ref Range    Extra Tube Hold for add-ons.    Lavender Top    Collection Time: 08/14/19 11:06 PM   Result Value Ref Range    Extra Tube hold for add-on    Gold Top - SST    Collection Time: 08/14/19 11:06 PM   Result Value Ref Range    Extra Tube Hold for add-ons.    CBC Auto Differential    Collection Time: 08/14/19 11:06 PM   Result Value Ref Range    WBC 10.28 3.40 - 10.80 10*3/mm3    RBC 4.05 3.77 - 5.28 10*6/mm3    Hemoglobin 12.4 12.0 - 15.9 g/dL    Hematocrit 37.9 34.0 - 46.6 %    MCV 93.6 79.0 - 97.0 fL    MCH 30.6 26.6 - 33.0 pg    MCHC 32.7 31.5 - 35.7 g/dL    RDW 12.8 12.3 - 15.4 %    RDW-SD 43.5 37.0 - 54.0 fl    MPV 11.5 6.0 - 12.0 fL    Platelets 221 140 - 450 10*3/mm3    Neutrophil % 61.4 42.7 - 76.0 %    Lymphocyte % 29.4 19.6 - 45.3 %    Monocyte % 7.5 5.0 - 12.0 %    Eosinophil % 0.7 0.3 - 6.2 %    Basophil % 0.5 0.0 - 1.5 %    Immature Grans % 0.5 0.0 - 0.5 %    Neutrophils, Absolute 6.32 1.70 - 7.00 10*3/mm3    Lymphocytes, Absolute 3.02 0.70 - 3.10 10*3/mm3    Monocytes, Absolute 0.77 0.10 - 0.90 10*3/mm3    Eosinophils, Absolute 0.07 0.00 - 0.40 10*3/mm3    Basophils, Absolute 0.05 0.00 - 0.20 10*3/mm3    Immature Grans, Absolute 0.05 0.00 - 0.05 10*3/mm3    nRBC 0.0 0.0 - 0.2 /100 WBC   Comprehensive Metabolic Panel    Collection Time: 08/14/19 11:06 PM   Result Value Ref Range    Glucose 143 (H) 65 - 99 mg/dL    BUN 8 6 - 20 mg/dL    Creatinine 0.69  0.57 - 1.00 mg/dL    Sodium 137 136 - 145 mmol/L    Potassium 3.9 3.5 - 5.2 mmol/L    Chloride 102 98 - 107 mmol/L    CO2 23.0 22.0 - 29.0 mmol/L    Calcium 9.7 8.6 - 10.5 mg/dL    Total Protein 7.4 6.0 - 8.5 g/dL    Albumin 4.10 3.50 - 5.20 g/dL    ALT (SGPT) 10 1 - 33 U/L    AST (SGOT) 14 1 - 32 U/L    Alkaline Phosphatase 77 39 - 117 U/L    Total Bilirubin 0.3 0.2 - 1.2 mg/dL    eGFR Non African Amer 104 >60 mL/min/1.73    Globulin 3.3 gm/dL    A/G Ratio 1.2 g/dL    BUN/Creatinine Ratio 11.6 7.0 - 25.0    Anion Gap 12.0 5.0 - 15.0 mmol/L   Protime-INR    Collection Time: 08/14/19 11:06 PM   Result Value Ref Range    Protime 12.4 11.2 - 14.3 Seconds    INR 0.97 0.85 - 1.16   aPTT    Collection Time: 08/14/19 11:06 PM   Result Value Ref Range    PTT 34.2 24.0 - 37.0 seconds   D-dimer, Quantitative    Collection Time: 08/14/19 11:06 PM   Result Value Ref Range    D-Dimer, Quantitative 0.69 (H) 0.00 - 0.56 MCGFEU/mL   Preeclampsia Panel    Collection Time: 08/14/19 11:06 PM   Result Value Ref Range    Alkaline Phosphatase 75 39 - 117 U/L    ALT (SGPT) 9 1 - 33 U/L    AST (SGOT) 13 1 - 32 U/L    Creatinine 0.70 0.57 - 1.00 mg/dL    Total Bilirubin 0.3 0.2 - 1.2 mg/dL     135 - 214 U/L    Uric Acid 2.8 2.4 - 5.7 mg/dL     Note: In addition to lab results from this visit, the labs listed above may include labs taken at another facility or during a different encounter within the last 24 hours. Please correlate lab times with ED admission and discharge times for further clarification of the services performed during this visit.    CT Angiogram Chest With Contrast   Final Result      1. Excellent bolus timing.      2. No evidence of pulmonary embolism.      3. No acute findings in the chest.      Signer Name: Juan Pablo Reynolds MD    Signed: 8/15/2019 2:38 AM    Workstation Name: [email or web address]     [facility]        Vitals:    08/14/19 2251 08/14/19 2345 08/15/19 0030 08/15/19 0235   BP: 119/81  "109/72 104/72 105/63   BP Location: Left arm      Patient Position: Sitting      Pulse: 78 70 72 96   Resp: 16   18   Temp: 98.9 °F (37.2 °C)      TempSrc: Oral      SpO2: 98% 98% 96% 96%   Weight: 82.6 kg (182 lb)      Height: 157.5 cm (62\")        Medications   sodium chloride 0.9 % flush 10 mL (not administered)   iopamidol (ISOVUE-370) 76 % injection 100 mL (85 mL Intravenous Given 8/15/19 0221)     ECG/EMG Results (last 24 hours)     Procedure Component Value Units Date/Time    ECG 12 Lead [[id number]] Collected:  08/14/19 2301     Updated:  08/14/19 2355    Narrative:       Test Reason : CHEST PAIN  Blood Pressure : **/** mmHG  Vent. Rate : 067 BPM     Atrial Rate : 067 BPM     P-R Int : 184 ms          QRS Dur : 114 ms      QT Int : 384 ms       P-R-T Axes : 039 089 037 degrees     QTc Int : 405 ms    Normal sinus rhythm  Low voltage QRS  Borderline ECG  When compared with ECG of 10-APR-2018 04:18,  Incomplete right bundle branch block is no longer present  Confirmed by HANY MANDUJANO MD ([id number]) on 8/14/2019 11:55:41 PM    Referred By:  EDMD           Confirmed By:HANY MANDUJANO MD        ECG 12 Lead   Final Result   Test Reason : CHEST PAIN   Blood Pressure : **/** mmHG   Vent. Rate : 067 BPM     Atrial Rate : 067 BPM      P-R Int : 184 ms          QRS Dur : 114 ms       QT Int : 384 ms       P-R-T Axes : 039 089 037 degrees      QTc Int : 405 ms      Normal sinus rhythm   Low voltage QRS   Borderline ECG   When compared with ECG of 10-APR-2018 04:18,   Incomplete right bundle branch block is no longer present   Confirmed by HANY MANDUJANO MD ([id number]) on 8/14/2019 11:55:41 PM      Referred By:  EDMD           Confirmed By:HANY MANDUJANO MD                          [facility]    Final diagnoses:   21 weeks gestation of pregnancy   Shortness of breath   Blood glucose elevated       Documentation assistance provided by carina Ruff.  Information recorded by the scribe was done at my direction and has been verified and " validated by me.     Maryam Ruff  08/14/19 0472       Gabriel Campo MD  08/15/19 7665

## 2019-08-15 NOTE — PROGRESS NOTES
Patient initially sent from the emergency room for evaluation of complaints of chest pain.  Patient is normotensive and chest pain felt to be unrelated to pregnancy.  Referred back to emergency room for further evaluation and treatment as deemed necessary.

## 2019-08-15 NOTE — TELEPHONE ENCOUNTER
Pt called in pt states she had chest pains last night and she went to the ER  and was diagnosed w/hyperglycemia. States the ER doctor spoke w/Dr. Romero. Pt states she hadn't ate in 10-11 hours and her sugar was high. Pt denies anything to drink before lab draw.     Pt requesting to see Dr. Romero this week and per ER note pt to f/u.     Advised pt I would forward this to Dr. Romero so he could review lab work results from ER.

## 2019-08-16 ENCOUNTER — APPOINTMENT (OUTPATIENT)
Dept: LAB | Facility: HOSPITAL | Age: 24
End: 2019-08-16

## 2019-08-16 ENCOUNTER — ROUTINE PRENATAL (OUTPATIENT)
Dept: OBSTETRICS AND GYNECOLOGY | Facility: CLINIC | Age: 24
End: 2019-08-16

## 2019-08-16 VITALS — BODY MASS INDEX: 33.47 KG/M2 | SYSTOLIC BLOOD PRESSURE: 118 MMHG | WEIGHT: 183 LBS | DIASTOLIC BLOOD PRESSURE: 70 MMHG

## 2019-08-16 DIAGNOSIS — O09.299 HX OF PREECLAMPSIA, PRIOR PREGNANCY, CURRENTLY PREGNANT: ICD-10-CM

## 2019-08-16 DIAGNOSIS — Z82.79 FAMILY HISTORY OF CONGENITAL HEART DEFECT: ICD-10-CM

## 2019-08-16 DIAGNOSIS — O09.92 HIGH-RISK PREGNANCY IN SECOND TRIMESTER: Primary | ICD-10-CM

## 2019-08-16 DIAGNOSIS — O34.219 PREVIOUS CESAREAN DELIVERY AFFECTING PREGNANCY: ICD-10-CM

## 2019-08-16 DIAGNOSIS — R73.9 HYPERGLYCEMIA: ICD-10-CM

## 2019-08-16 LAB — HBA1C MFR BLD: 4.92 % (ref 4.8–5.6)

## 2019-08-16 PROCEDURE — 0502F SUBSEQUENT PRENATAL CARE: CPT | Performed by: OBSTETRICS & GYNECOLOGY

## 2019-08-16 PROCEDURE — 36415 COLL VENOUS BLD VENIPUNCTURE: CPT | Performed by: OBSTETRICS & GYNECOLOGY

## 2019-08-16 PROCEDURE — 83036 HEMOGLOBIN GLYCOSYLATED A1C: CPT | Performed by: OBSTETRICS & GYNECOLOGY

## 2019-08-16 NOTE — PROGRESS NOTES
Chief Complaint   Patient presents with   • Routine Prenatal Visit       HPI: Tatianna is a  currently at 21w2d who comes in today due to concerns of her blood sugar being elevated when she was in the emergency department recently.  She continues to feel foggy in the head.  Has not been eating a lot of protein.  Tends to have a lot of carbohydrate with pasta as her main meal.    ROS:  GI: Nausea - YES ; Constipation - No; Diarrhea - No    Neuro: Headache - No ; Visual change - No      EXAM:  Vitals: See prenatal flowsheet   Abdomen: See prenatal flowsheet   Urine glucose/protein: See prenatal flowsheet   Pelvic: See prenatal flowsheet     Lab Results   Component Value Date    ABO O 2019    RH Positive 2019    ABSCRN Negative 2019       MDM:  Impression: 1. Supervision of high risk pregnancy   2. Hyperglycemia.  Suspect this more relates to insufficient protein and fat not pre-gestational diabetes   Tests done today: 1.  Hemoglobin A1c   Topics discussed: 1. Continue with PNV's  2. Prenatal labs reviewed  3. Assuming hemoglobin A1c is normal would encourage her to increase protein and fat and reduce carbohydrate intake.  If symptoms do not improve may benefit from gallbladder ultrasound   Tests scheduled today for her next visit:   As previously scheduled

## 2019-08-18 PROBLEM — R73.9 HYPERGLYCEMIA: Status: RESOLVED | Noted: 2019-08-15 | Resolved: 2019-08-18

## 2019-08-19 ENCOUNTER — TELEPHONE (OUTPATIENT)
Dept: OBSTETRICS AND GYNECOLOGY | Facility: CLINIC | Age: 24
End: 2019-08-19

## 2019-08-26 ENCOUNTER — APPOINTMENT (OUTPATIENT)
Dept: WOMENS IMAGING | Facility: HOSPITAL | Age: 24
End: 2019-08-26

## 2019-08-26 ENCOUNTER — HOSPITAL ENCOUNTER (INPATIENT)
Facility: HOSPITAL | Age: 24
LOS: 2 days | Discharge: HOME | End: 2019-08-28
Attending: OBSTETRICS & GYNECOLOGY | Admitting: OBSTETRICS & GYNECOLOGY

## 2019-08-26 ENCOUNTER — TELEPHONE (OUTPATIENT)
Dept: OBSTETRICS AND GYNECOLOGY | Facility: CLINIC | Age: 24
End: 2019-08-26

## 2019-08-26 PROBLEM — O45.92: Status: ACTIVE | Noted: 2019-08-26

## 2019-08-26 LAB
ABO GROUP BLD: NORMAL
ALP SERPL-CCNC: 68 U/L (ref 39–117)
ALT SERPL W P-5'-P-CCNC: 9 U/L (ref 1–33)
AST SERPL-CCNC: 12 U/L (ref 1–32)
BILIRUB SERPL-MCNC: 0.5 MG/DL (ref 0.2–1.2)
BILIRUB UR QL STRIP: NEGATIVE
BLD GP AB SCN SERPL QL: NEGATIVE
CLARITY UR: CLEAR
COLOR UR: YELLOW
CREAT BLD-MCNC: 0.66 MG/DL (ref 0.57–1)
DEPRECATED RDW RBC AUTO: 42.9 FL (ref 37–54)
ERYTHROCYTE [DISTWIDTH] IN BLOOD BY AUTOMATED COUNT: 12.6 % (ref 12.3–15.4)
GLUCOSE UR STRIP-MCNC: NEGATIVE MG/DL
HCT VFR BLD AUTO: 32.2 % (ref 34–46.6)
HGB BLD-MCNC: 10.7 G/DL (ref 12–15.9)
HGB UR QL STRIP.AUTO: NEGATIVE
KETONES UR QL STRIP: NEGATIVE
LDH SERPL-CCNC: 148 U/L (ref 135–214)
LEUKOCYTE ESTERASE UR QL STRIP.AUTO: NEGATIVE
MCH RBC QN AUTO: 30.9 PG (ref 26.6–33)
MCHC RBC AUTO-ENTMCNC: 33.2 G/DL (ref 31.5–35.7)
MCV RBC AUTO: 93.1 FL (ref 79–97)
NITRITE UR QL STRIP: NEGATIVE
PH UR STRIP.AUTO: 6 [PH] (ref 5–8)
PLATELET # BLD AUTO: 194 10*3/MM3 (ref 140–450)
PMV BLD AUTO: 11.2 FL (ref 6–12)
PROT UR QL STRIP: NEGATIVE
RBC # BLD AUTO: 3.46 10*6/MM3 (ref 3.77–5.28)
RH BLD: POSITIVE
SP GR UR STRIP: 1.01 (ref 1–1.03)
T&S EXPIRATION DATE: NORMAL
URATE SERPL-MCNC: 2.9 MG/DL (ref 2.4–5.7)
UROBILINOGEN UR QL STRIP: NORMAL
WBC NRBC COR # BLD: 7.02 10*3/MM3 (ref 3.4–10.8)

## 2019-08-26 PROCEDURE — 81003 URINALYSIS AUTO W/O SCOPE: CPT | Performed by: OBSTETRICS & GYNECOLOGY

## 2019-08-26 PROCEDURE — 84550 ASSAY OF BLOOD/URIC ACID: CPT | Performed by: OBSTETRICS & GYNECOLOGY

## 2019-08-26 PROCEDURE — 82565 ASSAY OF CREATININE: CPT | Performed by: OBSTETRICS & GYNECOLOGY

## 2019-08-26 PROCEDURE — 84450 TRANSFERASE (AST) (SGOT): CPT | Performed by: OBSTETRICS & GYNECOLOGY

## 2019-08-26 PROCEDURE — 86901 BLOOD TYPING SEROLOGIC RH(D): CPT | Performed by: OBSTETRICS & GYNECOLOGY

## 2019-08-26 PROCEDURE — 99222 1ST HOSP IP/OBS MODERATE 55: CPT | Performed by: OBSTETRICS & GYNECOLOGY

## 2019-08-26 PROCEDURE — 86900 BLOOD TYPING SEROLOGIC ABO: CPT | Performed by: OBSTETRICS & GYNECOLOGY

## 2019-08-26 PROCEDURE — 59025 FETAL NON-STRESS TEST: CPT

## 2019-08-26 PROCEDURE — 86850 RBC ANTIBODY SCREEN: CPT | Performed by: OBSTETRICS & GYNECOLOGY

## 2019-08-26 PROCEDURE — 83615 LACTATE (LD) (LDH) ENZYME: CPT | Performed by: OBSTETRICS & GYNECOLOGY

## 2019-08-26 PROCEDURE — 84460 ALANINE AMINO (ALT) (SGPT): CPT | Performed by: OBSTETRICS & GYNECOLOGY

## 2019-08-26 PROCEDURE — 87086 URINE CULTURE/COLONY COUNT: CPT | Performed by: OBSTETRICS & GYNECOLOGY

## 2019-08-26 PROCEDURE — 84075 ASSAY ALKALINE PHOSPHATASE: CPT | Performed by: OBSTETRICS & GYNECOLOGY

## 2019-08-26 PROCEDURE — 82247 BILIRUBIN TOTAL: CPT | Performed by: OBSTETRICS & GYNECOLOGY

## 2019-08-26 PROCEDURE — 85027 COMPLETE CBC AUTOMATED: CPT | Performed by: OBSTETRICS & GYNECOLOGY

## 2019-08-26 PROCEDURE — 25010000002 BETAMETHASONE ACET & SOD PHOS PER 4 MG: Performed by: OBSTETRICS & GYNECOLOGY

## 2019-08-26 PROCEDURE — 25010000002 ONDANSETRON PER 1 MG: Performed by: OBSTETRICS & GYNECOLOGY

## 2019-08-26 PROCEDURE — 76817 TRANSVAGINAL US OBSTETRIC: CPT | Performed by: OBSTETRICS & GYNECOLOGY

## 2019-08-26 PROCEDURE — 76817 TRANSVAGINAL US OBSTETRIC: CPT

## 2019-08-26 RX ORDER — SODIUM CHLORIDE, SODIUM LACTATE, POTASSIUM CHLORIDE, CALCIUM CHLORIDE 600; 310; 30; 20 MG/100ML; MG/100ML; MG/100ML; MG/100ML
75 INJECTION, SOLUTION INTRAVENOUS CONTINUOUS
Status: ACTIVE | OUTPATIENT
Start: 2019-08-26 | End: 2019-08-27

## 2019-08-26 RX ORDER — MAGNESIUM SULFATE HEPTAHYDRATE 40 MG/ML
1.5 INJECTION, SOLUTION INTRAVENOUS CONTINUOUS
Status: DISPENSED | OUTPATIENT
Start: 2019-08-26 | End: 2019-08-27

## 2019-08-26 RX ORDER — ACETAMINOPHEN 325 MG/1
650 TABLET ORAL EVERY 4 HOURS PRN
Status: DISCONTINUED | OUTPATIENT
Start: 2019-08-26 | End: 2019-08-28 | Stop reason: HOSPADM

## 2019-08-26 RX ORDER — BETAMETHASONE SODIUM PHOSPHATE AND BETAMETHASONE ACETATE 3; 3 MG/ML; MG/ML
12 INJECTION, SUSPENSION INTRA-ARTICULAR; INTRALESIONAL; INTRAMUSCULAR; SOFT TISSUE EVERY 24 HOURS
Status: COMPLETED | OUTPATIENT
Start: 2019-08-26 | End: 2019-08-27

## 2019-08-26 RX ORDER — ONDANSETRON 2 MG/ML
4 INJECTION INTRAMUSCULAR; INTRAVENOUS EVERY 8 HOURS PRN
Status: DISCONTINUED | OUTPATIENT
Start: 2019-08-26 | End: 2019-08-28 | Stop reason: HOSPADM

## 2019-08-26 RX ORDER — SODIUM CHLORIDE 0.9 % (FLUSH) 0.9 %
3-10 SYRINGE (ML) INJECTION AS NEEDED
Status: DISCONTINUED | OUTPATIENT
Start: 2019-08-26 | End: 2019-08-28 | Stop reason: HOSPADM

## 2019-08-26 RX ORDER — LIDOCAINE HYDROCHLORIDE 10 MG/ML
5 INJECTION, SOLUTION EPIDURAL; INFILTRATION; INTRACAUDAL; PERINEURAL AS NEEDED
Status: DISCONTINUED | OUTPATIENT
Start: 2019-08-26 | End: 2019-08-28 | Stop reason: HOSPADM

## 2019-08-26 RX ORDER — SODIUM CHLORIDE 0.9 % (FLUSH) 0.9 %
3 SYRINGE (ML) INJECTION EVERY 12 HOURS SCHEDULED
Status: DISCONTINUED | OUTPATIENT
Start: 2019-08-26 | End: 2019-08-28 | Stop reason: HOSPADM

## 2019-08-26 RX ADMIN — MAGNESIUM SULFATE HEPTAHYDRATE 2 G/HR: 40 INJECTION, SOLUTION INTRAVENOUS at 14:18

## 2019-08-26 RX ADMIN — ONDANSETRON 4 MG: 2 INJECTION INTRAMUSCULAR; INTRAVENOUS at 14:54

## 2019-08-26 RX ADMIN — SODIUM CHLORIDE, POTASSIUM CHLORIDE, SODIUM LACTATE AND CALCIUM CHLORIDE 75 ML/HR: 600; 310; 30; 20 INJECTION, SOLUTION INTRAVENOUS at 13:50

## 2019-08-26 RX ADMIN — MAGNESIUM SULFATE HEPTAHYDRATE 2 G/HR: 40 INJECTION, SOLUTION INTRAVENOUS at 20:23

## 2019-08-26 RX ADMIN — SODIUM CHLORIDE, POTASSIUM CHLORIDE, SODIUM LACTATE AND CALCIUM CHLORIDE 75 ML/HR: 600; 310; 30; 20 INJECTION, SOLUTION INTRAVENOUS at 19:01

## 2019-08-26 RX ADMIN — BETAMETHASONE SODIUM PHOSPHATE AND BETAMETHASONE ACETATE 12 MG: 3; 3 INJECTION, SUSPENSION INTRA-ARTICULAR; INTRALESIONAL; INTRAMUSCULAR at 14:11

## 2019-08-26 NOTE — TELEPHONE ENCOUNTER
MARYANA    OB PT 32WKS STARTED BLEEDING AFTER SEX LAST NIGHT AROUND MIDNIGHT. THIS MORNING SHE'S NOT BLEEDING BUT CRAMPING PRETTY STEADY.

## 2019-08-26 NOTE — TELEPHONE ENCOUNTER
Informed pt that bleeding ater intercourse is common and that she needed to refrain from anything in the vagina until she is not cramping. Also informed her that if she continues to cramp and can not tolerate it that she can come to be checked out. Pt stated understanding.

## 2019-08-27 LAB — MAGNESIUM SERPL-MCNC: 7 MG/DL (ref 1.6–2.6)

## 2019-08-27 PROCEDURE — 25010000002 ONDANSETRON PER 1 MG: Performed by: OBSTETRICS & GYNECOLOGY

## 2019-08-27 PROCEDURE — 83735 ASSAY OF MAGNESIUM: CPT | Performed by: OBSTETRICS & GYNECOLOGY

## 2019-08-27 PROCEDURE — 99232 SBSQ HOSP IP/OBS MODERATE 35: CPT | Performed by: OBSTETRICS & GYNECOLOGY

## 2019-08-27 PROCEDURE — 25010000002 BETAMETHASONE ACET & SOD PHOS PER 4 MG: Performed by: OBSTETRICS & GYNECOLOGY

## 2019-08-27 PROCEDURE — 25010000002 TERBUTALINE PER 1 MG: Performed by: OBSTETRICS & GYNECOLOGY

## 2019-08-27 RX ORDER — TERBUTALINE SULFATE 1 MG/ML
0.25 INJECTION, SOLUTION SUBCUTANEOUS ONCE
Status: COMPLETED | OUTPATIENT
Start: 2019-08-27 | End: 2019-08-27

## 2019-08-27 RX ADMIN — BETAMETHASONE SODIUM PHOSPHATE AND BETAMETHASONE ACETATE 12 MG: 3; 3 INJECTION, SUSPENSION INTRA-ARTICULAR; INTRALESIONAL; INTRAMUSCULAR at 14:37

## 2019-08-27 RX ADMIN — TERBUTALINE SULFATE 0.25 MG: 1 INJECTION SUBCUTANEOUS at 14:37

## 2019-08-27 RX ADMIN — ACETAMINOPHEN 650 MG: 325 TABLET ORAL at 12:30

## 2019-08-27 RX ADMIN — SODIUM CHLORIDE, PRESERVATIVE FREE 3 ML: 5 INJECTION INTRAVENOUS at 20:29

## 2019-08-27 RX ADMIN — SODIUM CHLORIDE, PRESERVATIVE FREE 10 ML: 5 INJECTION INTRAVENOUS at 22:18

## 2019-08-27 RX ADMIN — ONDANSETRON 4 MG: 2 INJECTION INTRAMUSCULAR; INTRAVENOUS at 10:40

## 2019-08-28 ENCOUNTER — APPOINTMENT (OUTPATIENT)
Dept: ULTRASOUND IMAGING | Facility: HOSPITAL | Age: 24
End: 2019-08-28

## 2019-08-28 VITALS
RESPIRATION RATE: 16 BRPM | SYSTOLIC BLOOD PRESSURE: 112 MMHG | DIASTOLIC BLOOD PRESSURE: 75 MMHG | WEIGHT: 183 LBS | HEART RATE: 61 BPM | HEIGHT: 62 IN | BODY MASS INDEX: 33.68 KG/M2 | OXYGEN SATURATION: 97 % | TEMPERATURE: 98.3 F

## 2019-08-28 LAB — BACTERIA SPEC AEROBE CULT: NORMAL

## 2019-08-28 PROCEDURE — 76705 ECHO EXAM OF ABDOMEN: CPT

## 2019-08-28 PROCEDURE — 99238 HOSP IP/OBS DSCHRG MGMT 30/<: CPT | Performed by: OBSTETRICS & GYNECOLOGY

## 2019-08-28 RX ORDER — ALUMINA, MAGNESIA, AND SIMETHICONE 2400; 2400; 240 MG/30ML; MG/30ML; MG/30ML
15 SUSPENSION ORAL EVERY 6 HOURS PRN
Status: DISCONTINUED | OUTPATIENT
Start: 2019-08-28 | End: 2019-08-28 | Stop reason: HOSPADM

## 2019-08-28 RX ADMIN — SODIUM CHLORIDE, PRESERVATIVE FREE 3 ML: 5 INJECTION INTRAVENOUS at 09:24

## 2019-08-28 RX ADMIN — ALUMINUM HYDROXIDE, MAGNESIUM HYDROXIDE, AND DIMETHICONE 30 ML: 400; 400; 40 SUSPENSION ORAL at 10:14

## 2019-09-02 ENCOUNTER — HOSPITAL ENCOUNTER (OUTPATIENT)
Facility: HOSPITAL | Age: 24
Setting detail: OBSERVATION
Discharge: HOME | End: 2019-09-03
Attending: OBSTETRICS & GYNECOLOGY | Admitting: OBSTETRICS & GYNECOLOGY

## 2019-09-02 PROBLEM — O47.00 PRETERM UTERINE CONTRACTIONS: Status: ACTIVE | Noted: 2019-09-02

## 2019-09-02 LAB
ABO GROUP BLD: NORMAL
APTT PPP: 28.4 SECONDS (ref 24–37)
BACTERIA UR QL AUTO: NORMAL /HPF
BILIRUB UR QL STRIP: NEGATIVE
BLD GP AB SCN SERPL QL: NEGATIVE
CLARITY UR: ABNORMAL
COLOR UR: YELLOW
DEPRECATED RDW RBC AUTO: 41.6 FL (ref 37–54)
ERYTHROCYTE [DISTWIDTH] IN BLOOD BY AUTOMATED COUNT: 12 % (ref 12.3–15.4)
FIBRINOGEN PPP-MCNC: 370 MG/DL (ref 220–400)
GLUCOSE UR STRIP-MCNC: NEGATIVE MG/DL
HCT VFR BLD AUTO: 34.3 % (ref 34–46.6)
HGB BLD-MCNC: 11.1 G/DL (ref 12–15.9)
HGB UR QL STRIP.AUTO: NEGATIVE
HYALINE CASTS UR QL AUTO: NORMAL /LPF
INR PPP: 1 (ref 0.85–1.16)
KETONES UR QL STRIP: NEGATIVE
LEUKOCYTE ESTERASE UR QL STRIP.AUTO: NEGATIVE
MCH RBC QN AUTO: 30.4 PG (ref 26.6–33)
MCHC RBC AUTO-ENTMCNC: 32.4 G/DL (ref 31.5–35.7)
MCV RBC AUTO: 94 FL (ref 79–97)
NITRITE UR QL STRIP: POSITIVE
PH UR STRIP.AUTO: 6 [PH] (ref 5–8)
PLATELET # BLD AUTO: 208 10*3/MM3 (ref 140–450)
PMV BLD AUTO: 11.3 FL (ref 6–12)
PROT UR QL STRIP: NEGATIVE
PROTHROMBIN TIME: 12.7 SECONDS (ref 11.2–14.3)
RBC # BLD AUTO: 3.65 10*6/MM3 (ref 3.77–5.28)
RBC # UR: NORMAL /HPF
REF LAB TEST METHOD: NORMAL
RH BLD: POSITIVE
SP GR UR STRIP: 1.01 (ref 1–1.03)
SQUAMOUS #/AREA URNS HPF: NORMAL /HPF
T&S EXPIRATION DATE: NORMAL
UROBILINOGEN UR QL STRIP: ABNORMAL
WBC NRBC COR # BLD: 10.63 10*3/MM3 (ref 3.4–10.8)
WBC UR QL AUTO: NORMAL /HPF

## 2019-09-02 PROCEDURE — 86901 BLOOD TYPING SEROLOGIC RH(D): CPT | Performed by: OBSTETRICS & GYNECOLOGY

## 2019-09-02 PROCEDURE — 86850 RBC ANTIBODY SCREEN: CPT | Performed by: OBSTETRICS & GYNECOLOGY

## 2019-09-02 PROCEDURE — 85384 FIBRINOGEN ACTIVITY: CPT | Performed by: OBSTETRICS & GYNECOLOGY

## 2019-09-02 PROCEDURE — 85610 PROTHROMBIN TIME: CPT | Performed by: OBSTETRICS & GYNECOLOGY

## 2019-09-02 PROCEDURE — 85027 COMPLETE CBC AUTOMATED: CPT | Performed by: OBSTETRICS & GYNECOLOGY

## 2019-09-02 PROCEDURE — G0378 HOSPITAL OBSERVATION PER HR: HCPCS

## 2019-09-02 PROCEDURE — 81001 URINALYSIS AUTO W/SCOPE: CPT | Performed by: OBSTETRICS & GYNECOLOGY

## 2019-09-02 PROCEDURE — 86900 BLOOD TYPING SEROLOGIC ABO: CPT | Performed by: OBSTETRICS & GYNECOLOGY

## 2019-09-02 PROCEDURE — 99218 PR INITIAL OBSERVATION CARE/DAY 30 MINUTES: CPT | Performed by: OBSTETRICS & GYNECOLOGY

## 2019-09-02 PROCEDURE — 85730 THROMBOPLASTIN TIME PARTIAL: CPT | Performed by: OBSTETRICS & GYNECOLOGY

## 2019-09-02 PROCEDURE — 59025 FETAL NON-STRESS TEST: CPT

## 2019-09-02 RX ORDER — SODIUM CHLORIDE, SODIUM LACTATE, POTASSIUM CHLORIDE, CALCIUM CHLORIDE 600; 310; 30; 20 MG/100ML; MG/100ML; MG/100ML; MG/100ML
75 INJECTION, SOLUTION INTRAVENOUS CONTINUOUS
Status: DISCONTINUED | OUTPATIENT
Start: 2019-09-02 | End: 2019-09-03 | Stop reason: HOSPADM

## 2019-09-02 RX ORDER — NALOXONE HCL 0.4 MG/ML
0.4 VIAL (ML) INJECTION
Status: DISCONTINUED | OUTPATIENT
Start: 2019-09-02 | End: 2019-09-03 | Stop reason: HOSPADM

## 2019-09-02 RX ORDER — ONDANSETRON 2 MG/ML
4 INJECTION INTRAMUSCULAR; INTRAVENOUS EVERY 8 HOURS PRN
Status: DISCONTINUED | OUTPATIENT
Start: 2019-09-02 | End: 2019-09-03 | Stop reason: HOSPADM

## 2019-09-02 RX ORDER — SODIUM CHLORIDE 0.9 % (FLUSH) 0.9 %
10 SYRINGE (ML) INJECTION AS NEEDED
Status: DISCONTINUED | OUTPATIENT
Start: 2019-09-02 | End: 2019-09-03 | Stop reason: HOSPADM

## 2019-09-02 RX ORDER — SODIUM CHLORIDE 0.9 % (FLUSH) 0.9 %
3 SYRINGE (ML) INJECTION EVERY 12 HOURS SCHEDULED
Status: DISCONTINUED | OUTPATIENT
Start: 2019-09-02 | End: 2019-09-03 | Stop reason: HOSPADM

## 2019-09-02 RX ORDER — LIDOCAINE HYDROCHLORIDE 10 MG/ML
5 INJECTION, SOLUTION EPIDURAL; INFILTRATION; INTRACAUDAL; PERINEURAL AS NEEDED
Status: DISCONTINUED | OUTPATIENT
Start: 2019-09-02 | End: 2019-09-03 | Stop reason: HOSPADM

## 2019-09-02 RX ORDER — ACETAMINOPHEN 325 MG/1
650 TABLET ORAL EVERY 4 HOURS PRN
Status: DISCONTINUED | OUTPATIENT
Start: 2019-09-02 | End: 2019-09-03 | Stop reason: HOSPADM

## 2019-09-02 RX ADMIN — SODIUM CHLORIDE, POTASSIUM CHLORIDE, SODIUM LACTATE AND CALCIUM CHLORIDE 75 ML/HR: 600; 310; 30; 20 INJECTION, SOLUTION INTRAVENOUS at 19:41

## 2019-09-02 NOTE — H&P
[place]  Obstetric History and Physical    Chief Complaint   Patient presents with   • Abdominal Cramping     HPI:      Patient is a 23 y.o. female  currently at 23w5d, who presents with all day of cramping, abdominal pian, and lower midline back pain.  She was admitted last week with some post coital bleeding and after a PDC USN showed a 2 x 3 cm fundal abruption.   She also has a history of a previous delivery for severe pre-eclampsia.   When she was here last week, she was on Magnesium and received BMZ x2 despite her early gestational age.  Her cervix was closed and long on that admission.  She denies bleeding and leaking.  She says she has been just resting all day in air condition and not doing anything strenuous.  Despite her resting, hydrating and getting in the tub, nothing made the pain any better.               Prenatal Information:  Prenatal Results     Initial Prenatal Labs     Test Value Reference Range Date Time    Hemoglobin 12.4 g/dL 12.0 - 15.9 g/dL 19 2306    Hematocrit 37.9 % 34.0 - 46.6 % 19 2306    Platelets 194 10*3/mm3 140 - 450 10*3/mm3 19 1429    Rubella IgG Positive   19 1436    Hepatitis B SAg Non-Reactive  Non-Reactive 19 1436    Hepatitis C Ab Non-Reactive  Non-Reactive 19 1436    RPR Non-Reactive  Non-Reactive 19 1436    ABO O   19 1429    Rh Positive   19 1429    Antibody Screen Negative   19 1436    HIV Non-Reactive  Non-Reactive 19 1436    Urine Culture 25,000 CFU/mL Mixed Jannet Isolated   19 1227    Gonorrhea negative   19     Chlamydia negative   19     TSH 0.017 mIU/mL 0.350 - 5.350 mIU/mL 18 1300          2nd and 3rd Trimester     Test Value Reference Range Date Time    Hemoglobin (repeated) 10.7 g/dL 12.0 - 15.9 g/dL 19 1429    Hematocrit (repeated) 32.2 % 34.0 - 46.6 % 19 1429    GCT        Antibody Screen (repeated) Negative   19 1429    GTT Fasting         GTT 1 Hr        GTT 2 Hr        GTT 3 Hr        Group B Strep              Drug Screening     Test Value Reference Range Date Time    Amphetamine Screen Negative  Negative 05/06/19 1436    Barbiturate Screen Negative  Negative 05/06/19 1436    Benzodiazepine Screen Negative  Negative 05/06/19 1436    Methadone Screen Negative  Negative 05/06/19 1436    Phencyclidine Screen Negative  Negative 05/06/19 1436    Opiates Screen Negative  Negative 05/06/19 1436    THC Screen Negative  Negative 05/06/19 1436    Cocaine Screen Negative  Negative 05/06/19 1436    Propoxyphene Screen Negative  Negative 05/06/19 1436    Buprenorphine Screen Negative  Negative 05/06/19 1436    Methamphetamine Screen Negative  Negative 05/06/19 1436    Oxycodone Screen Negative  Negative 05/06/19 1436    Tricyclic Antidepressants Screen Negative  Negative 05/06/19 1436          Other (Risk screening)     Test Value Reference Range Date Time    Varicella IgG        Parvovirus IgG        CMV IgG        Cystic Fibrosis        Hemoglobin electrophoresis        NIPT declined   06/17/19     MSAFP-4 declined   06/17/19     AFP (for NTD only)                  External Prenatal Results     Pregnancy Outside Results - Transcribed From Office Records - See Scanned Records For Details     Test Value Date Time    Hgb 10.7 g/dL 08/26/19 1429    Hct 32.2 % 08/26/19 1429    ABO O  08/26/19 1429    Rh Positive  08/26/19 1429    Antibody Screen Negative  08/26/19 1429    Glucose Fasting GTT       Glucose Tolerance Test 1 hour       Glucose Tolerance Test 3 hour       Gonorrhea (discrete) negative  06/17/19     Chlamydia (discrete) negative  06/17/19     RPR Non-Reactive  05/06/19 1436    VDRL       Syphilis Antibody       Rubella Positive  05/06/19 1436    HBsAg Non-Reactive  05/06/19 1436    Herpes Simplex Virus PCR       Herpes Simplex VIrus Culture       HIV Non-Reactive  05/06/19 1436    Hep C RNA Quant PCR       Hep C Antibody Non-Reactive  05/06/19 1436     AFP       Group B Strep       GBS Susceptibility to Clindamycin       GBS Susceptibility to Erythromycin       Fetal Fibronectin       Genetic Testing, Maternal Blood             Drug Screening     Test Value Date Time    Urine Drug Screen       Amphetamine Screen Negative  19 1436    Barbiturate Screen Negative  19 1436    Benzodiazepine Screen Negative  19 1436    Methadone Screen Negative  19 1436    Phencyclidine Screen Negative  19 1436    Opiates Screen Negative  19 1436    THC Screen Negative  19 1436    Cocaine Screen       Propoxyphene Screen Negative  19 1436    Buprenorphine Screen Negative  19 1436    Methamphetamine Screen       Oxycodone Screen Negative  19 1436    Tricyclic Antidepressants Screen Negative  19 1436                 Past OB History:     Obstetric History       T0      L1     SAB1   TAB0   Ectopic0   Molar0   Multiple0   Live Births1       # Outcome Date GA Lbr Alan/2nd Weight Sex Delivery Anes PTL Lv   3 Current            2  04/10/18 30w1d  940 g (2 lb 1.2 oz) F CS-LTranv Spinal N JO ANN      Name: Reva      Complications: Preeclampsia complicating hypertension      Apgar1:  7                Apgar5: 8   1 SAB 2017 6w0d             Obstetric Comments   2018 - Reva       Past Medical History: Past Medical History:   Diagnosis Date   • Attention deficit hyperactivity disorder (ADHD), combined type 2012    Off meds after 1-2 years   • History of scarlet fever    • Hypothyroid     Resolved after ~ 3 years of Rxs   • Mirena 2018    Placed on 2018 - came out with tampon   • Pelvic adhesive disease 2016   • Postpartum thyroiditis 2018      Past Surgical History Past Surgical History:   Procedure Laterality Date   •  SECTION N/A 4/10/2018    Procedure: Primary LTCS (1 layer closure)  Surgeon: Jamal Almeida MD;     • LAPAROSCOPIC APPENDECTOMY     • LAPAROSCOPIC  LYSIS OF ADHESIONS  12/20/2016    Findings consistent with chronic PID   • NASAL SEPTUM SURGERY  2009   • [place] CATH/INJECT HYSTEROSALPINGOGRAM  06/28/2017     Uterus normal; right adnexa normal; left tube did not demonstrate fill or spill   • TONSILLECTOMY AND ADENOIDECTOMY  2003      Family History: Family History   Problem Relation Age of Onset   • Coronary artery disease Paternal Grandmother    • Diabetes Paternal Grandmother    • Hypertension Paternal Grandmother       Social History:  reports that she quit smoking about 2 years ago. Her smoking use included cigarettes. She started smoking about 7 years ago. She smoked 0.25 packs per day. She has never used smokeless tobacco.   reports that she does not drink alcohol.   reports that she does not use drugs.        Review of Systems:   Denies chest pain, SOA, HA.  All other pertinent positives and negatives are addressed in the HPI      Objective     Vital Signs Range for the last 24 hours  Temperature:     Temp Source:     BP: BP: (123)/(68) 123/68   Pulse: Heart Rate:  [70] 70   Respirations: Resp:  [18] 18   SPO2:     O2 Amount (l/min):     O2 Devices     Weight:       Physical Examination: General appearance - oriented to person, place, and time  Chest - no tachypnea, retractions or cyanosis  Heart - normal rate and regular rhythm, S1 and S2 normal  Abdomen - soft, nontender, nondistended, no masses or organomegaly  bowel sounds normal  Extremities - no pedal edema noted, many tattoos.     Presentation: Breech by USN   Cervix: Exam by:     Dilation: Cervical Dilation (cm): 0   Effacement:  thick   Station:  high     Fetal Heart Rate Assessment   Method: Fetal HR Assessment Method: external   Beats/min: Fetal HR (beats/min): 150   Baseline:     Variability: Fetal HR Variability: moderate (amplitude range 6 to 25 bpm)   Accels:     Decels:     Tracing Category:       Uterine Assessment   Method:     Frequency (min):  None on TOCO, patient reports every 4-8 minutes    Ctx Count in 10 min:     Duration:     Intensity:     Intensity by IUPC:     Resting Tone:     Resting Tone by IUPC:     Alma Units:           Assessment/Plan         Assessment & Plan    Assessment:  1.  Intrauterine pregnancy at 23w5d gestation with reassuring fetal status.    2.   contractions with known aburption    Plan:  1.  Admit  2.  CBC, T&S, Fibrinogen PT, PTT, INR  3.  IV  4.  PDC scan in am  5.  TID monitoring    Toño Jack MD  2019  7:07 PM

## 2019-09-03 ENCOUNTER — APPOINTMENT (OUTPATIENT)
Dept: WOMENS IMAGING | Facility: HOSPITAL | Age: 24
End: 2019-09-03

## 2019-09-03 VITALS
DIASTOLIC BLOOD PRESSURE: 78 MMHG | TEMPERATURE: 98 F | HEART RATE: 72 BPM | SYSTOLIC BLOOD PRESSURE: 119 MMHG | RESPIRATION RATE: 16 BRPM

## 2019-09-03 PROCEDURE — 76816 OB US FOLLOW-UP PER FETUS: CPT

## 2019-09-03 PROCEDURE — 76825 ECHO EXAM OF FETAL HEART: CPT

## 2019-09-03 PROCEDURE — 99217 PR OBSERVATION CARE DISCHARGE MANAGEMENT: CPT | Performed by: OBSTETRICS & GYNECOLOGY

## 2019-09-03 PROCEDURE — 93325 DOPPLER ECHO COLOR FLOW MAPG: CPT

## 2019-09-03 PROCEDURE — 76816 OB US FOLLOW-UP PER FETUS: CPT | Performed by: OBSTETRICS & GYNECOLOGY

## 2019-09-03 PROCEDURE — 93325 DOPPLER ECHO COLOR FLOW MAPG: CPT | Performed by: OBSTETRICS & GYNECOLOGY

## 2019-09-03 PROCEDURE — G0378 HOSPITAL OBSERVATION PER HR: HCPCS

## 2019-09-03 PROCEDURE — 76825 ECHO EXAM OF FETAL HEART: CPT | Performed by: OBSTETRICS & GYNECOLOGY

## 2019-09-03 RX ADMIN — SODIUM CHLORIDE, POTASSIUM CHLORIDE, SODIUM LACTATE AND CALCIUM CHLORIDE 75 ML/HR: 600; 310; 30; 20 INJECTION, SOLUTION INTRAVENOUS at 04:37

## 2019-09-03 NOTE — PROGRESS NOTES
Anatoly Diane  : 1995  MRN: [id number]  CSN: [id number]    Hospital Day: 2    CC: hospital follow-up    Antepartum Progress Note    Subjective   Continues to feel crampy but no worse than on admission.  Fetal movement remains good.  No leaking or bleeding.    Of note admission H&P documents 3 cm fundal abruption.  Review of prior ultrasound shows that the area referenced is a placental cyst.  There is no evidence of a fundal placental abruption.  Rather there was blood seen at the internal cervical loss at the inferior margin of the placenta.       Objective     Min/max vitals past 24 hours:   Temp  Min: 98 °F (36.7 °C)  Max: 98.3 °F (36.8 °C)  BP  Min: 96/50  Max: 123/68  Pulse  Min: 65  Max: 80  Resp  Min: 16  Max: 18         General: well developed; well nourished  no acute distress   Heart: Not performed.   Lungs: breathing is unlabored   Abdomen: soft, non-tender; no masses  no umbilical or inguinal hernias are present  no hepato-splenomegaly   FHT's: reassuring   Cervix: was not checked.   Contractions: none   Back: CVA tenderness is absent bilateral         Ultrasound shows no evidence of cervical shortening, placental abruption or cervical funneling       Assessment   1. IUP at 23w6d  2. Pelvic cramps.  Cannot exclude irritation from previously seen placental abruption.  Cannot exclude round ligament pain as etiology  3. Fundal placental cyst, incidental     Plan   1. Discharge to home on continued limited activity including pelvic rest  2. Follow-up appointment in 2 weeks time    Chacorta Romero MD  9/3/2019  8:58 AM

## 2019-09-03 NOTE — DISCHARGE SUMMARY
SID Ledbetter   Tatianna Diane  : 1995  MRN: [id number]  CSN: [id number]    Discharge Summary    A formal discharge summary was not needed because this admission was for an observation visit.    Discharge Date: 9/3/2019   Discharge Dx:    1. IUP @ 23w6d  2. False labor   Disposition: home   Condition at discharge: stable   Follow up: 2 weeks         This note has been electronically signed.    Chacorta Romero MD

## 2019-09-03 NOTE — POST-PROCEDURE NOTE
MFM Ultrasound    Breech  Size=Dates = 1lb 7oz  DEYSI normal  Fetal heart well visualized and appears structurally normal.    Cervix normal at 3.2 cm  No clot at cervix  No subchorionic hematoma seen today.

## 2019-09-04 ENCOUNTER — TELEPHONE (OUTPATIENT)
Dept: OBSTETRICS AND GYNECOLOGY | Facility: CLINIC | Age: 24
End: 2019-09-04

## 2019-09-04 ENCOUNTER — APPOINTMENT (OUTPATIENT)
Dept: WOMENS IMAGING | Facility: HOSPITAL | Age: 24
End: 2019-09-04

## 2019-09-04 PROBLEM — O47.00 PRETERM UTERINE CONTRACTIONS: Status: RESOLVED | Noted: 2019-09-02 | Resolved: 2019-09-04

## 2019-09-04 NOTE — TELEPHONE ENCOUNTER
Dr Romero Pt    Pt is 24w0d and was diagnosed with the Flu today.  Pt states she cannot take Tamiflu as it causes violent vomiting.  Pt has been running fever 101.4 but has reduced to 98.9 with Tylenol.  She is also very nauseous.      Callback [phone]  [employer]

## 2019-09-04 NOTE — TELEPHONE ENCOUNTER
Make sure she was in fact diagnosed by laboratory studies.  Assuming it was influenza if she can take the Tamiflu, not much to do at this point other than let us try a course.  Would continue to use Tylenol to keep the fever down and take plenty of fluids.  If she starts to notice persistent symptoms or trouble with breathing or persistent fever she will need to be seen immediately in the hospital to make sure she is not developing pneumonia on top of influenza

## 2019-09-05 ENCOUNTER — APPOINTMENT (OUTPATIENT)
Dept: WOMENS IMAGING | Facility: HOSPITAL | Age: 24
End: 2019-09-05

## 2019-09-05 NOTE — TELEPHONE ENCOUNTER
Dr. Romero patient 24w1d  [phone] spoke with patient this morning and she states she was diagnosed with Type A Flu, she went to [facility] in [place]. Per patient she is taking Tylenol as directed and has not had a fever since she went to the Clinic. I advised patient to make sure she is drinking plenty of fluids and if she develops a fever over 101 and trouble breathing for her to go to the hospital immediately to be evaluated. Patient verbalized understanding.

## 2019-09-17 ENCOUNTER — APPOINTMENT (OUTPATIENT)
Dept: GENERAL RADIOLOGY | Facility: HOSPITAL | Age: 24
End: 2019-09-17

## 2019-09-17 ENCOUNTER — HOSPITAL ENCOUNTER (OUTPATIENT)
Facility: HOSPITAL | Age: 24
Setting detail: OBSERVATION
Discharge: HOME | End: 2019-09-17
Attending: OBSTETRICS & GYNECOLOGY | Admitting: OBSTETRICS & GYNECOLOGY

## 2019-09-17 ENCOUNTER — TELEPHONE (OUTPATIENT)
Dept: OBSTETRICS AND GYNECOLOGY | Facility: CLINIC | Age: 24
End: 2019-09-17

## 2019-09-17 VITALS
SYSTOLIC BLOOD PRESSURE: 124 MMHG | HEIGHT: 62 IN | TEMPERATURE: 98.6 F | HEART RATE: 88 BPM | WEIGHT: 185 LBS | DIASTOLIC BLOOD PRESSURE: 68 MMHG | BODY MASS INDEX: 34.04 KG/M2 | RESPIRATION RATE: 16 BRPM | OXYGEN SATURATION: 99 %

## 2019-09-17 PROBLEM — Z87.09 HISTORY OF INFLUENZA: Status: ACTIVE | Noted: 2019-09-17

## 2019-09-17 PROBLEM — Z34.90 PREGNANCY: Status: ACTIVE | Noted: 2019-09-17

## 2019-09-17 PROBLEM — J20.9 ACUTE BRONCHITIS: Status: ACTIVE | Noted: 2019-09-17

## 2019-09-17 LAB
BASOPHILS # BLD AUTO: 0.03 10*3/MM3 (ref 0–0.2)
BASOPHILS NFR BLD AUTO: 0.4 % (ref 0–1.5)
DEPRECATED RDW RBC AUTO: 41.3 FL (ref 37–54)
EOSINOPHIL # BLD AUTO: 0.1 10*3/MM3 (ref 0–0.4)
EOSINOPHIL NFR BLD AUTO: 1.3 % (ref 0.3–6.2)
ERYTHROCYTE [DISTWIDTH] IN BLOOD BY AUTOMATED COUNT: 12.1 % (ref 12.3–15.4)
FLUAV SUBTYP SPEC NAA+PROBE: NOT DETECTED
FLUBV RNA ISLT QL NAA+PROBE: NOT DETECTED
HCT VFR BLD AUTO: 32.2 % (ref 34–46.6)
HGB BLD-MCNC: 10.6 G/DL (ref 12–15.9)
IMM GRANULOCYTES # BLD AUTO: 0.05 10*3/MM3 (ref 0–0.05)
IMM GRANULOCYTES NFR BLD AUTO: 0.6 % (ref 0–0.5)
LYMPHOCYTES # BLD AUTO: 1.5 10*3/MM3 (ref 0.7–3.1)
LYMPHOCYTES NFR BLD AUTO: 18.9 % (ref 19.6–45.3)
MCH RBC QN AUTO: 30.6 PG (ref 26.6–33)
MCHC RBC AUTO-ENTMCNC: 32.9 G/DL (ref 31.5–35.7)
MCV RBC AUTO: 93.1 FL (ref 79–97)
MONOCYTES # BLD AUTO: 0.43 10*3/MM3 (ref 0.1–0.9)
MONOCYTES NFR BLD AUTO: 5.4 % (ref 5–12)
NEUTROPHILS # BLD AUTO: 5.82 10*3/MM3 (ref 1.7–7)
NEUTROPHILS NFR BLD AUTO: 73.4 % (ref 42.7–76)
NRBC BLD AUTO-RTO: 0 /100 WBC (ref 0–0.2)
PLATELET # BLD AUTO: 203 10*3/MM3 (ref 140–450)
PMV BLD AUTO: 11.7 FL (ref 6–12)
RBC # BLD AUTO: 3.46 10*6/MM3 (ref 3.77–5.28)
WBC NRBC COR # BLD: 7.93 10*3/MM3 (ref 3.4–10.8)

## 2019-09-17 PROCEDURE — 71046 X-RAY EXAM CHEST 2 VIEWS: CPT

## 2019-09-17 PROCEDURE — 94640 AIRWAY INHALATION TREATMENT: CPT

## 2019-09-17 PROCEDURE — 99219 PR INITIAL OBSERVATION CARE/DAY 50 MINUTES: CPT | Performed by: OBSTETRICS & GYNECOLOGY

## 2019-09-17 PROCEDURE — 87502 INFLUENZA DNA AMP PROBE: CPT | Performed by: OBSTETRICS & GYNECOLOGY

## 2019-09-17 PROCEDURE — 85025 COMPLETE CBC W/AUTO DIFF WBC: CPT | Performed by: OBSTETRICS & GYNECOLOGY

## 2019-09-17 PROCEDURE — G0378 HOSPITAL OBSERVATION PER HR: HCPCS

## 2019-09-17 RX ORDER — CEFUROXIME AXETIL 500 MG/1
500 TABLET ORAL EVERY 12 HOURS SCHEDULED
Qty: 20 TABLET | Refills: 0 | Status: SHIPPED | OUTPATIENT
Start: 2019-09-17 | End: 2019-09-27

## 2019-09-17 RX ORDER — CEFUROXIME AXETIL 250 MG/1
500 TABLET ORAL EVERY 12 HOURS SCHEDULED
Status: DISCONTINUED | OUTPATIENT
Start: 2019-09-17 | End: 2019-09-17 | Stop reason: HOSPADM

## 2019-09-17 RX ORDER — ALBUTEROL SULFATE 2.5 MG/3ML
2.5 SOLUTION RESPIRATORY (INHALATION) ONCE
Status: COMPLETED | OUTPATIENT
Start: 2019-09-17 | End: 2019-09-17

## 2019-09-17 RX ORDER — ACETAMINOPHEN 500 MG
1000 TABLET ORAL EVERY 6 HOURS PRN
Status: DISCONTINUED | OUTPATIENT
Start: 2019-09-17 | End: 2019-09-17 | Stop reason: HOSPADM

## 2019-09-17 RX ADMIN — ACETAMINOPHEN 1000 MG: 500 TABLET, FILM COATED ORAL at 13:06

## 2019-09-17 RX ADMIN — ALBUTEROL SULFATE 2.5 MG: 2.5 SOLUTION RESPIRATORY (INHALATION) at 11:56

## 2019-09-17 RX ADMIN — HYDROCODONE POLISTIREX AND CHLORPHENIRAMINE POLISTIREX 5 ML: 10; 8 SUSPENSION, EXTENDED RELEASE ORAL at 12:57

## 2019-09-17 NOTE — H&P
\[facility]  Obstetric History and Physical    Referring Provider: Chacorta Romero MD      Chief Complaint   Patient presents with   • Cough   • Shortness of Breath       Subjective           Patient is a 23 y.o. female  currently at 25w6d, who presents with C/O productive cough and low-grade fever.  She reports developed a productive cough over the past 24 hours low-grade fever.  Patient reports being diagnosed with type a influenza on  patient, did not take Tamiflu secondary to previous intolerance to the medication..  She reports having a viral type syndrome symptoms with body aches  low-grade fever nonproductive cough for 5 to 6 days.  She states those symptoms resolved other than a cough.  She states  the cough became more productive yesterday with body aches and low-grade fever.  She also reports her  recently also tested positive flu influenza A.  She denies any leaking of fluid, vaginal bleeding, urinary symptoms, or any other associated concerns.  Prenatal care by Dr. Romero .  OB history significant for previous  delivery at 30 weeks gestation for severe preeclampsia.  This current pregnancy complicated by a 2 x 3 cm fundal abruption which required hospitalization, magnesium sulfate tocolyse this and she received 2 doses of betamethasone.    Her prenatal care is complicated by  prior   desires repeat .  Her previous obstetric/gynecological history is noted for is remarkable for .    The following portions of the patients history were reviewed and updated as appropriate: current medications, allergies, past medical history, past surgical history, past family history, past social history and problem list .       Prenatal Information:   Maternal Prenatal Labs  Blood Type No results found for: ABO   Rh Status No results found for: RH   Antibody Screen No results found for: ABSCRN   Gonnorhea No results found for: GCCX   Chlamydia No results found for:  CLAMYDCU   RPR No results found for: RPR   Syphilis Antibody No results found for: SYPHILIS   Rubella No results found for: RUBELLAIGGIN   Hepatitis B Surface Antigen No results found for: HEPBSAG   HIV-1 Antibody No results found for: LABHIV1   Hepatitis C Antibody No results found for: HEPCAB   Rapid Urin Drug Screen No results found for: AMPMETHU, BARBITSCNUR, LABBENZSCN, LABMETHSCN, LABOPIASCN, THCURSCR, COCAINEUR, AMPHETSCREEN, PROPOXSCN, BUPRENORSCNU, METAMPSCNUR, OXYCODONESCN, TRICYCLICSCN   Group B Strep Culture No results found for: GBSANTIGEN           External Prenatal Results     Pregnancy Outside Results - Transcribed From Office Records - See Scanned Records For Details     Test Value Date Time    Hgb 10.6 g/dL 09/17/19 1130    Hct 32.2 % 09/17/19 1130    ABO O  09/02/19 1937    Rh Positive  09/02/19 1937    Antibody Screen Negative  09/02/19 1937    Glucose Fasting GTT       Glucose Tolerance Test 1 hour       Glucose Tolerance Test 3 hour       Gonorrhea (discrete) negative  06/17/19     Chlamydia (discrete) negative  06/17/19     RPR Non-Reactive  05/06/19 1436    VDRL       Syphilis Antibody       Rubella Positive  05/06/19 1436    HBsAg Non-Reactive  05/06/19 1436    Herpes Simplex Virus PCR       Herpes Simplex VIrus Culture       HIV Non-Reactive  05/06/19 1436    Hep C RNA Quant PCR       Hep C Antibody Non-Reactive  05/06/19 1436    AFP       Group B Strep       GBS Susceptibility to Clindamycin       GBS Susceptibility to Erythromycin       Fetal Fibronectin       Genetic Testing, Maternal Blood             Drug Screening     Test Value Date Time    Urine Drug Screen       Amphetamine Screen Negative  05/06/19 1436    Barbiturate Screen Negative  05/06/19 1436    Benzodiazepine Screen Negative  05/06/19 1436    Methadone Screen Negative  05/06/19 1436    Phencyclidine Screen Negative  05/06/19 1436    Opiates Screen Negative  05/06/19 1436    THC Screen Negative  05/06/19 1436    Cocaine  Screen       Propoxyphene Screen Negative  19 1436    Buprenorphine Screen Negative  19 1436    Methamphetamine Screen       Oxycodone Screen Negative  19 1436    Tricyclic Antidepressants Screen Negative  19 1436                  Past OB History:       Obstetric History       T0      L1     SAB1   TAB0   Ectopic0   Molar0   Multiple0   Live Births1       # Outcome Date GA Lbr Alan/2nd Weight Sex Delivery Anes PTL Lv   3 Current            2  04/10/18 30w1d  940 g (2 lb 1.2 oz) F CS-LTranv Spinal N JO ANN      Name: Reva      Complications: Preeclampsia complicating hypertension      Apgar1:  7                Apgar5: 8   1 SAB 2017 6w0d             Obstetric Comments   2018 - Reva       Past Medical History: Past Medical History:   Diagnosis Date   • Attention deficit hyperactivity disorder (ADHD), combined type 2012    Off meds after 1-2 years   • History of scarlet fever    • Hypothyroid 2006    Resolved after ~ 3 years of Rxs   • Influenza A 2019   • Mirena 2018    Placed on 2018 - came out with tampon   • Pelvic adhesive disease 2016   • Postpartum thyroiditis 2018      Past Surgical History Past Surgical History:   Procedure Laterality Date   •  SECTION N/A 4/10/2018    Procedure: Primary LTCS (1 layer closure)  Surgeon: Jamal Almeida MD;     • LAPAROSCOPIC APPENDECTOMY     • LAPAROSCOPIC LYSIS OF ADHESIONS  2016    Findings consistent with chronic PID   • NASAL SEPTUM SURGERY     • AR CATH/INJECT HYSTEROSALPINGOGRAM  2017     Uterus normal; right adnexa normal; left tube did not demonstrate fill or spill   • TONSILLECTOMY AND ADENOIDECTOMY        Family History: Family History   Problem Relation Age of Onset   • Coronary artery disease Paternal Grandmother    • Diabetes Paternal Grandmother    • Hypertension Paternal Grandmother       Social History:  reports that she quit smoking about 2 years  ago. Her smoking use included cigarettes. She started smoking about 7 years ago. She smoked 0.25 packs per day. She has never used smokeless tobacco.   reports that she does not drink alcohol.   reports that she does not use drugs.                   General ROS Negative Findings:Visual Changes, Epigastric pain, Anorexia, Nausia/Vomiting, ROM and Vaginal Bleeding    ROS     All other systems have been reviewed and are neg  Objective       Vital Signs Range for the last 24 hours  Temperature: Temp:  [98.6 °F (37 °C)] 98.6 °F (37 °C)   Temp Source: Temp src: Oral   BP: BP: (124)/(68) 124/68   Pulse: Heart Rate:  [] 88   Respirations: Resp:  [16-28] 16   SPO2: SpO2:  [95 %-99 %] 99 % RA   O2 Amount (l/min):     O2 Devices Device (Oxygen Therapy): room air   Weight: Weight:  [83.9 kg (185 lb)] 83.9 kg (185 lb)     Physical Examination:   General:   alert, appears stated age and cooperative   Skin:   normal   HEENT:  Sclera clear   Lungs:   Bilateral rhonchi noted throughout fields no rales or wheezing noted.   Heart:   regular rate and rhythm, S1, S2 normal, no murmur, click, rub or gallop   Abdomen:  Soft, gravid uterus, no guarding, rebound, benign exam   Lower Extremities  no edema, no calf tenderness   Pelvis:  Exam deferred.     Neuro-no focal deficits DTRs 2% 4 no clonus    Presentation:    Cervix: Exam by:     Dilation:     Effacement:     Station:         Fetal Heart Rate Assessment   Method: Fetal HR Assessment Method: external   Beats/min: Fetal HR (beats/min): 145   Baseline:     Varibility:     Accels:     Decels:     Tracing Category:       Uterine Assessment   Method: Method: palpation, per patient report   Frequency (min):     Ctx Count in 10 min:     Duration:     Intensity:     Intensity by IUPC:     Resting Tone: Uterine Resting Tone: soft by palpation   Resting Tone by IUPC:     [place] Units:       Laboratory Results:   Lab Results (last 24 hours)     Procedure Component Value Units Date/Time     CBC & Differential [[id number]] Collected:  19 1130    Specimen:  Blood Updated:  19    Narrative:       The following orders were created for panel order CBC & Differential.  Procedure                               Abnormality         Status                     ---------                               -----------         ------                     CBC Auto Differential[[id number]]        Abnormal            Final result                 Please view results for these tests on the individual orders.    CBC Auto Differential [[id number]]  (Abnormal) Collected:  19 113    Specimen:  Blood Updated:  19     WBC 7.93 10*3/mm3      RBC 3.46 10*6/mm3      Hemoglobin 10.6 g/dL      Hematocrit 32.2 %      MCV 93.1 fL      MCH 30.6 pg      MCHC 32.9 g/dL      RDW 12.1 %      RDW-SD 41.3 fl      MPV 11.7 fL      Platelets 203 10*3/mm3      Neutrophil % 73.4 %      Lymphocyte % 18.9 %      Monocyte % 5.4 %      Eosinophil % 1.3 %      Basophil % 0.4 %      Immature Grans % 0.6 %      Neutrophils, Absolute 5.82 10*3/mm3      Lymphocytes, Absolute 1.50 10*3/mm3      Monocytes, Absolute 0.43 10*3/mm3      Eosinophils, Absolute 0.10 10*3/mm3      Basophils, Absolute 0.03 10*3/mm3      Immature Grans, Absolute 0.05 10*3/mm3      nRBC 0.0 /100 WBC         Radiology Review:   Imaging Results (last 24 hours)     ** No results found for the last 24 hours. **        Other Studies:    Assessment/Plan       * No active hospital problems. *        Assessment:  1.  Intrauterine pregnancy at 25w6d weeks gestation with reassuring fetal status.    2.  History of viral syndrome( influenza-type a) evaluate for pneumonia      -Productive cough  3.  History of second trimester placental abruption-now resolved  4.  History of  delivery at 30 weeks 1 day secondary to severe preeclampsia    Plan:  1.  Observation, nebulized Proventil, influenza A&B PCR, cough suppressant, chest x-ray to rule out pneumonia  2.  Plan of care has been reviewed with patient.  3.  Risks, benefits of treatment plan have been discussed.  4.  All questions have been answered.  5 discussed with Dr. Nick Alfaro DO  9/17/2019  12:41 PM

## 2019-09-17 NOTE — PROGRESS NOTES
Laborist    Patient seen and examined chart reviewed    Patient feels much better coughing only occasionally.    All signs stable afebrile  Pulse ox normal on room air    CBC within normal limits, chest x-ray consistent with bronchitis without any evidence of pneumonia,  And influenza PCR negative    IMP  1.  IUP at 25 weeks 6 days          2.  Status post type a influenza          3.  Acute bronchitis without evidence of pneumonia or hypoxia  PLAN          1.  Discharged home, rest, increase water intake, discussed with patient nutrition, Rx Ceftin 500 mg twice daily for 10 days, Tussionex 5 mL's twice daily dispense 60 mL, follow-up with Dr. Margarita Lovett in 3 to 4 days or as needed.  Discussed with patient if symptoms worsen or any other concerns return to labor and delivery or emergency room for further evaluation.  All questions answered patient agreed with plan.  Discussed with Dr. Romero

## 2019-09-22 ENCOUNTER — HOSPITAL ENCOUNTER (EMERGENCY)
Facility: HOSPITAL | Age: 24
Discharge: ANOTHER HOSPITAL | End: 2019-09-22
Attending: EMERGENCY MEDICINE | Admitting: EMERGENCY MEDICINE

## 2019-09-22 VITALS
HEIGHT: 62 IN | SYSTOLIC BLOOD PRESSURE: 114 MMHG | OXYGEN SATURATION: 98 % | TEMPERATURE: 98.3 F | DIASTOLIC BLOOD PRESSURE: 79 MMHG | RESPIRATION RATE: 16 BRPM | WEIGHT: 185 LBS | BODY MASS INDEX: 34.04 KG/M2 | HEART RATE: 79 BPM

## 2019-09-22 DIAGNOSIS — Z3A.26 26 WEEKS GESTATION OF PREGNANCY: ICD-10-CM
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
## 2019-09-22 DIAGNOSIS — S61.451A DOG BITE OF RIGHT HAND, INITIAL ENCOUNTER: ICD-10-CM

## 2019-09-22 DIAGNOSIS — M65.141 SUPPURATIVE TENOSYNOVITIS OF FLEXOR TENDON OF RIGHT HAND: Primary | ICD-10-CM

## 2019-09-22 LAB
ALBUMIN SERPL-MCNC: 3.8 G/DL (ref 3.5–5.2)
ALBUMIN/GLOB SERPL: 1.3 G/DL
ALP SERPL-CCNC: 94 U/L (ref 39–117)
ALT SERPL W P-5'-P-CCNC: 11 U/L (ref 1–33)
ANION GAP SERPL CALCULATED.3IONS-SCNC: 11 MMOL/L (ref 5–15)
AST SERPL-CCNC: 16 U/L (ref 1–32)
BASOPHILS # BLD AUTO: 0.04 10*3/MM3 (ref 0–0.2)
BASOPHILS NFR BLD AUTO: 0.5 % (ref 0–1.5)
BILIRUB SERPL-MCNC: 0.3 MG/DL (ref 0.2–1.2)
BUN BLD-MCNC: 7 MG/DL (ref 6–20)
BUN/CREAT SERPL: 9.1 (ref 7–25)
CALCIUM SPEC-SCNC: 9.1 MG/DL (ref 8.6–10.5)
CHLORIDE SERPL-SCNC: 102 MMOL/L (ref 98–107)
CO2 SERPL-SCNC: 25 MMOL/L (ref 22–29)
CREAT BLD-MCNC: 0.77 MG/DL (ref 0.57–1)
DEPRECATED RDW RBC AUTO: 41.4 FL (ref 37–54)
EOSINOPHIL # BLD AUTO: 0.04 10*3/MM3 (ref 0–0.4)
EOSINOPHIL NFR BLD AUTO: 0.5 % (ref 0.3–6.2)
ERYTHROCYTE [DISTWIDTH] IN BLOOD BY AUTOMATED COUNT: 12 % (ref 12.3–15.4)
GFR SERPL CREATININE-BSD FRML MDRD: 91 ML/MIN/1.73
GLOBULIN UR ELPH-MCNC: 3 GM/DL
GLUCOSE BLD-MCNC: 89 MG/DL (ref 65–99)
HCT VFR BLD AUTO: 34.1 % (ref 34–46.6)
HGB BLD-MCNC: 11.2 G/DL (ref 12–15.9)
IMM GRANULOCYTES # BLD AUTO: 0.03 10*3/MM3 (ref 0–0.05)
IMM GRANULOCYTES NFR BLD AUTO: 0.4 % (ref 0–0.5)
LYMPHOCYTES # BLD AUTO: 1.4 10*3/MM3 (ref 0.7–3.1)
LYMPHOCYTES NFR BLD AUTO: 17.8 % (ref 19.6–45.3)
MCH RBC QN AUTO: 30.6 PG (ref 26.6–33)
MCHC RBC AUTO-ENTMCNC: 32.8 G/DL (ref 31.5–35.7)
MCV RBC AUTO: 93.2 FL (ref 79–97)
MONOCYTES # BLD AUTO: 0.58 10*3/MM3 (ref 0.1–0.9)
MONOCYTES NFR BLD AUTO: 7.4 % (ref 5–12)
NEUTROPHILS # BLD AUTO: 5.77 10*3/MM3 (ref 1.7–7)
NEUTROPHILS NFR BLD AUTO: 73.4 % (ref 42.7–76)
NRBC BLD AUTO-RTO: 0 /100 WBC (ref 0–0.2)
PLATELET # BLD AUTO: 214 10*3/MM3 (ref 140–450)
PMV BLD AUTO: 11 FL (ref 6–12)
POTASSIUM BLD-SCNC: 3.7 MMOL/L (ref 3.5–5.2)
PROT SERPL-MCNC: 6.8 G/DL (ref 6–8.5)
RBC # BLD AUTO: 3.66 10*6/MM3 (ref 3.77–5.28)
SODIUM BLD-SCNC: 138 MMOL/L (ref 136–145)
WBC NRBC COR # BLD: 7.86 10*3/MM3 (ref 3.4–10.8)

## 2019-09-22 PROCEDURE — 87205 SMEAR GRAM STAIN: CPT | Performed by: PHYSICIAN ASSISTANT

## 2019-09-22 PROCEDURE — 85025 COMPLETE CBC W/AUTO DIFF WBC: CPT | Performed by: EMERGENCY MEDICINE

## 2019-09-22 PROCEDURE — 96365 THER/PROPH/DIAG IV INF INIT: CPT

## 2019-09-22 PROCEDURE — 99284 EMERGENCY DEPT VISIT MOD MDM: CPT

## 2019-09-22 PROCEDURE — 80053 COMPREHEN METABOLIC PANEL: CPT | Performed by: EMERGENCY MEDICINE

## 2019-09-22 PROCEDURE — 25010000002 PIPERACILLIN SOD-TAZOBACTAM PER 1 G: Performed by: PHYSICIAN ASSISTANT

## 2019-09-22 PROCEDURE — 87070 CULTURE OTHR SPECIMN AEROBIC: CPT | Performed by: PHYSICIAN ASSISTANT

## 2019-09-22 RX ORDER — SODIUM CHLORIDE 0.9 % (FLUSH) 0.9 %
10 SYRINGE (ML) INJECTION AS NEEDED
Status: DISCONTINUED | OUTPATIENT
Start: 2019-09-22 | End: 2019-09-22 | Stop reason: HOSPADM

## 2019-09-22 RX ADMIN — TAZOBACTAM SODIUM AND PIPERACILLIN SODIUM 3.38 G: 375; 3 INJECTION, SOLUTION INTRAVENOUS at 12:35

## 2019-09-22 NOTE — ED PROVIDER NOTES
Subjective   23-year-old female that presents to the emergency department with a dog bite and thumb pain.  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  Patient reports the dog is growling in a threatening manner and she was trying to keep the dog at distance.  Patient reports that she is right-hand dominant.  She was seen yesterday at [facility] had x-rays which were negative she was already on Ceftin for a URI and they just left her on that antibiotic.  Woke up this morning with increasing pain she is unable to flex or extend the thumb without severe pain.  She is a red streak up the right wrist.  Patient states the other 4 fingers are more comfortable in a flexed position straightening these causes pain at the wrist not at the fingers.  She denies any numbness or tingling no weakness.  She had no fevers or chills.  She is 26-1/2 weeks intrauterine pregnant and has had a recent second trimester abruption which at this point according to the laborist note has resolved.  She denies any vaginal bleeding abdominal pain or otherwise.  Dog bite form was filled out by [facility].  Animal control is making a visit.        History provided by:  Patient   used: No    Hand Injury   Location:  Finger  Finger location:  R thumb  Injury: yes    Time since incident:  18 hours  Mechanism of injury comment:  Dog bite  Pain details:     Quality:  Aching and dull    Radiates to: Radiates up to the right volar wrist.    Severity:  Severe    Onset quality:  Gradual    Timing:  Constant    Progression:  Worsening  Handedness:  Right-handed  Dislocation: no    Foreign body present:  No foreign bodies  Tetanus status:  Up to date  Prior injury to area:  No  Relieved by:  Immobilization  Worsened by:  Movement, stretching area and exercise  Ineffective treatments:  None  tried  Associated symptoms: stiffness and swelling    Associated symptoms: no fever        Review of Systems   Constitutional: Negative for chills and fever.   Respiratory: Negative for chest tightness and wheezing.    Cardiovascular: Negative for chest pain and palpitations.   Gastrointestinal: Negative for abdominal pain.   Genitourinary: Negative for vaginal bleeding, vaginal discharge and vaginal pain.   Musculoskeletal: Positive for stiffness.   Skin: Positive for rash. Negative for pallor.   Psychiatric/Behavioral: Negative.    All other systems reviewed and are negative.      Past Medical History:   Diagnosis Date   • Attention deficit hyperactivity disorder (ADHD), combined type     Off meds after 1-2 years   • History of scarlet fever    • Hypothyroid     Resolved after ~ 3 years of Rxs   • Influenza A 2019   • Mirena 2018    Placed on 2018 - came out with tampon   • Pelvic adhesive disease 2016   • Postpartum thyroiditis 2018       No Known Allergies    Past Surgical History:   Procedure Laterality Date   •  SECTION N/A 4/10/2018    Procedure: Primary LTCS (1 layer closure)  Surgeon: Jamal Almeida MD;     • LAPAROSCOPIC APPENDECTOMY     • LAPAROSCOPIC LYSIS OF ADHESIONS  2016    Findings consistent with chronic PID   • NASAL SEPTUM SURGERY     • CA CATH/INJECT HYSTEROSALPINGOGRAM  2017     Uterus normal; right adnexa normal; left tube did not demonstrate fill or spill   • TONSILLECTOMY AND ADENOIDECTOMY         Family History   Problem Relation Age of Onset   • Coronary artery disease Paternal Grandmother    • Diabetes Paternal Grandmother    • Hypertension Paternal Grandmother        Social History     Socioeconomic History   • Marital status:      Spouse name: Not on file   • Number of children: Not on file   • Years of education: Not on file   • Highest education level: Not on file   Tobacco Use   • Smoking status: Former  Smoker     Packs/day: 0.25     Types: Cigarettes     Start date:      Last attempt to quit: 2017     Years since quittin.3   • Smokeless tobacco: Never Used   Substance and Sexual Activity   • Alcohol use: No   • Drug use: No   • Sexual activity: Yes   Social History Narrative    ** Merged History Encounter **                Objective   Physical Exam   Constitutional: She is oriented to person, place, and time. She appears well-developed and well-nourished. No distress.   HENT:   Head: Normocephalic and atraumatic.   Nose: Nose normal.   Eyes: Conjunctivae are normal. No scleral icterus.   Neck: Normal range of motion. Neck supple.   Cardiovascular: Normal rate, regular rhythm, normal heart sounds and intact distal pulses.   No murmur heard.  Pulmonary/Chest: Effort normal and breath sounds normal. No respiratory distress.   Abdominal: Soft. Bowel sounds are normal. There is no tenderness.   Musculoskeletal:   Right thumb has a small laceration from dog bite on the palm side between the IP and MP joint.  There is purulent drainage coming from this.  The thumb is universally edematous.  It is in a flexed position any movement with extension or flexion increases her pain.  She also has tenderness over the flexor tendon itself to palpation.  There is a red streak noted on the volar aspect up to about a third of the way up the wrist.  The nail is intact.  Sensation is intact.  Cap refill less than 2 seconds.   Neurological: She is alert and oriented to person, place, and time.   Skin: Skin is warm and dry. She is not diaphoretic.   Psychiatric: She has a normal mood and affect. Her behavior is normal.   Nursing note and vitals reviewed.      Procedures           ED Course  ED Course as of Sep 23 1956   Sun Sep 22, 2019   1243 Wound culture was collected.  [UVALDO]   1243 I discussed the patient with Dr. Carrillo orthopedics on call.  He states that patient needs to see a hand surgeon.  [UVALDO]   1243 Called [facility] and  spoke to Dr. Mills hand surgery on call.  She is advised as it may transfer the patient over to the emergency department she will evaluate the patient there.  [UVALDO]   1244 Medical records from [facility] were obtained and sent with the patient.  [UVALDO]   1245 I discussed the plan with the patient and her significant other.  Offered a EMS transfer.  The patient politely declined this states that she will have her  drive her down the street and present to the [facility] herself.  [UVALDO]      ED Course User Index  [UVALDO] David Perez PA                  MDM  Number of Diagnoses or Management Options  26 weeks gestation of pregnancy: new and requires workup  Dog bite of right hand, initial encounter: new and requires workup  Suppurative tenosynovitis of flexor tendon of right hand: new and requires workup     Amount and/or Complexity of Data Reviewed  Clinical lab tests: ordered and reviewed  Obtain history from someone other than the patient: yes  Review and summarize past medical records: yes  Discuss the patient with other providers: yes    Patient Progress  Patient progress: stable      Final diagnoses:   Suppurative tenosynovitis of flexor tendon of right hand   Dog bite of right hand, initial encounter   26 weeks gestation of pregnancy              David Perez PA  09/23/19 1956

## 2019-09-25 LAB
BACTERIA SPEC AEROBE CULT: NO GROWTH
GRAM STN SPEC: NORMAL
GRAM STN SPEC: NORMAL

## 2019-09-27 ENCOUNTER — ROUTINE PRENATAL (OUTPATIENT)
Dept: OBSTETRICS AND GYNECOLOGY | Facility: CLINIC | Age: 24
End: 2019-09-27

## 2019-09-27 VITALS — BODY MASS INDEX: 34.61 KG/M2 | SYSTOLIC BLOOD PRESSURE: 118 MMHG | WEIGHT: 189.2 LBS | DIASTOLIC BLOOD PRESSURE: 82 MMHG

## 2019-09-27 DIAGNOSIS — O09.299 HX OF PREECLAMPSIA, PRIOR PREGNANCY, CURRENTLY PREGNANT: ICD-10-CM

## 2019-09-27 DIAGNOSIS — O09.92 HIGH-RISK PREGNANCY IN SECOND TRIMESTER: Primary | ICD-10-CM

## 2019-09-27 DIAGNOSIS — O34.219 PREVIOUS CESAREAN DELIVERY AFFECTING PREGNANCY: ICD-10-CM

## 2019-09-27 PROBLEM — Z34.90 PREGNANCY: Status: RESOLVED | Noted: 2019-09-17 | Resolved: 2019-09-27

## 2019-09-27 PROBLEM — Z87.09 HISTORY OF INFLUENZA: Status: RESOLVED | Noted: 2019-09-17 | Resolved: 2019-09-27

## 2019-09-27 PROBLEM — J20.9 ACUTE BRONCHITIS: Status: RESOLVED | Noted: 2019-09-17 | Resolved: 2019-09-27

## 2019-09-27 PROBLEM — Z30.2 REQUEST FOR STERILIZATION: Status: ACTIVE | Noted: 2019-09-27

## 2019-09-27 PROCEDURE — 0502F SUBSEQUENT PRENATAL CARE: CPT | Performed by: OBSTETRICS & GYNECOLOGY

## 2019-09-27 RX ORDER — INFLUENZA VIRUS VACCINE 15; 15; 15; 15 UG/.5ML; UG/.5ML; UG/.5ML; UG/.5ML
SUSPENSION INTRAMUSCULAR
Refills: 0 | COMMUNITY
Start: 2019-08-25 | End: 2019-09-27

## 2019-09-27 RX ORDER — ONDANSETRON 4 MG/1
TABLET, FILM COATED ORAL
COMMUNITY
Start: 2019-09-24 | End: 2019-09-27

## 2019-09-27 RX ORDER — AMOXICILLIN AND CLAVULANATE POTASSIUM 875; 125 MG/1; MG/1
TABLET, FILM COATED ORAL
Refills: 0 | COMMUNITY
Start: 2019-09-25 | End: 2019-10-10

## 2019-09-27 NOTE — PROGRESS NOTES
Chief Complaint   Patient presents with   • Routine Prenatal Visit     pt states that she is wondering about her abruption. she also states that she thinks that she may be having [place] contractions but is not really sure       HPI: Tatianna is a  currently at 27w2d who today reports the following:  Contractions - No; Leaking - No; Vaginal bleeding -  No; Heartburn - No.    ROS:  GI: Nausea - No ; Constipation - No; Diarrhea - No    Neuro: Headache - No ; Visual change - No      EXAM:  Vitals: See prenatal flowsheet   Abdomen: See prenatal flowsheet   Urine glucose/protein: See prenatal flowsheet   Pelvic: See prenatal flowsheet     Lab Results   Component Value Date    ABO O 2019    RH Positive 2019    ABSCRN Negative 2019       MDM:  Impression: 1. Supervision of high risk pregnancy  2. Previous C/S with planned repeat C/S   3. Prior INOCENCIA - resolved  4. Influenza - resolved   Tests done today: 1. none   Topics discussed: 1. Continue with PNV's  2. Prenatal labs reviewed  3. none - she had no major complaints,questions or concerns   4. Resume baby ASA   Tests scheduled today for her next visit:   GCT  HgB

## 2019-10-10 ENCOUNTER — ROUTINE PRENATAL (OUTPATIENT)
Dept: OBSTETRICS AND GYNECOLOGY | Facility: CLINIC | Age: 24
End: 2019-10-10

## 2019-10-10 ENCOUNTER — TELEPHONE (OUTPATIENT)
Dept: OBSTETRICS AND GYNECOLOGY | Facility: CLINIC | Age: 24
End: 2019-10-10

## 2019-10-10 ENCOUNTER — APPOINTMENT (OUTPATIENT)
Dept: LAB | Facility: HOSPITAL | Age: 24
End: 2019-10-10

## 2019-10-10 VITALS — WEIGHT: 191 LBS | DIASTOLIC BLOOD PRESSURE: 76 MMHG | BODY MASS INDEX: 34.93 KG/M2 | SYSTOLIC BLOOD PRESSURE: 122 MMHG

## 2019-10-10 DIAGNOSIS — O45.92: ICD-10-CM

## 2019-10-10 DIAGNOSIS — O34.219 PREVIOUS CESAREAN DELIVERY AFFECTING PREGNANCY: ICD-10-CM

## 2019-10-10 DIAGNOSIS — O09.93 HIGH-RISK PREGNANCY IN THIRD TRIMESTER: Primary | ICD-10-CM

## 2019-10-10 DIAGNOSIS — O34.219 DECLINES VBAC (VAGINAL BIRTH AFTER CESAREAN) TRIAL: ICD-10-CM

## 2019-10-10 DIAGNOSIS — Z82.79 FAMILY HISTORY OF CONGENITAL HEART DEFECT: ICD-10-CM

## 2019-10-10 DIAGNOSIS — O09.299 HX OF PREECLAMPSIA, PRIOR PREGNANCY, CURRENTLY PREGNANT: ICD-10-CM

## 2019-10-10 DIAGNOSIS — Z30.2 REQUEST FOR STERILIZATION: ICD-10-CM

## 2019-10-10 DIAGNOSIS — O09.92 HIGH-RISK PREGNANCY IN SECOND TRIMESTER: ICD-10-CM

## 2019-10-10 PROBLEM — R12 HEARTBURN DURING PREGNANCY, THIRD TRIMESTER: Status: ACTIVE | Noted: 2019-10-10

## 2019-10-10 PROBLEM — O26.893 HEARTBURN DURING PREGNANCY, THIRD TRIMESTER: Status: ACTIVE | Noted: 2019-10-10

## 2019-10-10 LAB
GLUCOSE 1H P 100 G GLC PO SERPL-MCNC: 109 MG/DL (ref 65–140)
HCT VFR BLD AUTO: 32.7 % (ref 34–46.6)
HGB BLD-MCNC: 10.9 G/DL (ref 12–15.9)

## 2019-10-10 PROCEDURE — 85018 HEMOGLOBIN: CPT | Performed by: OBSTETRICS & GYNECOLOGY

## 2019-10-10 PROCEDURE — 0502F SUBSEQUENT PRENATAL CARE: CPT | Performed by: OBSTETRICS & GYNECOLOGY

## 2019-10-10 PROCEDURE — 36415 COLL VENOUS BLD VENIPUNCTURE: CPT | Performed by: OBSTETRICS & GYNECOLOGY

## 2019-10-10 PROCEDURE — 82950 GLUCOSE TEST: CPT | Performed by: OBSTETRICS & GYNECOLOGY

## 2019-10-10 PROCEDURE — 85014 HEMATOCRIT: CPT | Performed by: OBSTETRICS & GYNECOLOGY

## 2019-10-10 RX ORDER — FERROUS SULFATE 325(65) MG
325 TABLET ORAL
Qty: 60 TABLET | Refills: 3 | Status: SHIPPED | OUTPATIENT
Start: 2019-10-10 | End: 2020-02-07

## 2019-10-10 RX ORDER — OMEPRAZOLE 20 MG/1
20 CAPSULE, DELAYED RELEASE ORAL DAILY
Qty: 30 CAPSULE | Refills: 6
Start: 2019-10-10 | End: 2019-10-31

## 2019-10-10 NOTE — PROGRESS NOTES
Chief Complaint   Patient presents with   • Routine Prenatal Visit       HPI: Tatianna is a  currently at 29w1d who today reports the following:  Contractions - No; Leaking - No; Vaginal bleeding -  No; Heartburn - YES.    ROS:  GI: Nausea - No ; Constipation - No; Diarrhea - No    Neuro: Headache - No ; Visual change - No      EXAM:  Vitals: See prenatal flowsheet   Abdomen: See prenatal flowsheet   Urine glucose/protein: See prenatal flowsheet   Pelvic: See prenatal flowsheet     Lab Results   Component Value Date    ABO O 2019    RH Positive 2019    ABSCRN Negative 2019       MDM:  Impression: 1. Supervision of high risk pregnancy  2. Previous C/S with planned repeat C/S  3. GERD in pregnancy  4. Prior PIH - on baby ASA   Tests done today: 1. GCT  2. HgB   Topics discussed: 1. Continue with PNV's  2. Prenatal labs reviewed  3. The methods of female sterilization were discussed.  I discussed partial salpingectomy done at  and total salpingectomy done at  with Tatianna.  I explained that the historical failure rate for tubal ligation has been quoted as ~ 1/300.  Data from the CREST study suggests that for certain techniques, the failure rates could be as high as ~ 4%.  Both Essure and salpingectomy may reduce the failure rates historically seen with segmental salpingectomy.   Furthermore, total salpingectomy may be associated with a reduction in the lifetime incidence of ovarian cancer.  As compared to partial salpingectomy, total salpingectomy and Essure are permanent method of female sterilization that cannot be reversed.  I explained to Tatianna that with failures, there is an increased risk of ectopic pregnancy.  Ruptured ectopic gestations can be life threatening if not treated.  Procedural risks include but not limited to the risk of bleeding, infection, and damage to internal organs were discussed.   Additionally I discussed with Tatianna regret rate and post-tubal ligation  syndrome.  I discussed with Tatianna that for all practical purposes the procedure should be considered permanent and non-reversable.  If there is not complete certainty regarding sterilization, long acting reversible contraception (LARC's) should be considered.  Other forms of LARC's that were reviewed include IUD - Mirena.  Additionally, non-contraceptive benefits of these methods should be considered in making her final decision.   Tests scheduled today for her next visit:   none

## 2019-10-10 NOTE — TELEPHONE ENCOUNTER
maile Brown pt wants Rx for acid reflux meds called in because her ins will pay for it, even though over the counter options were given to her at appt today.    She also wants glucose results.    [facility]

## 2019-10-15 ENCOUNTER — HOSPITAL ENCOUNTER (OUTPATIENT)
Facility: HOSPITAL | Age: 24
Discharge: HOME | End: 2019-10-15
Attending: OBSTETRICS & GYNECOLOGY | Admitting: OBSTETRICS & GYNECOLOGY

## 2019-10-15 ENCOUNTER — TELEPHONE (OUTPATIENT)
Dept: WOMENS IMAGING | Facility: HOSPITAL | Age: 24
End: 2019-10-15

## 2019-10-15 ENCOUNTER — TELEPHONE (OUTPATIENT)
Dept: OBSTETRICS AND GYNECOLOGY | Facility: CLINIC | Age: 24
End: 2019-10-15

## 2019-10-15 VITALS
TEMPERATURE: 99.2 F | BODY MASS INDEX: 34.41 KG/M2 | RESPIRATION RATE: 16 BRPM | SYSTOLIC BLOOD PRESSURE: 110 MMHG | HEIGHT: 62 IN | HEART RATE: 80 BPM | DIASTOLIC BLOOD PRESSURE: 68 MMHG | WEIGHT: 187 LBS

## 2019-10-15 PROCEDURE — 99213 OFFICE O/P EST LOW 20 MIN: CPT | Performed by: OBSTETRICS & GYNECOLOGY

## 2019-10-15 PROCEDURE — 59025 FETAL NON-STRESS TEST: CPT | Performed by: OBSTETRICS & GYNECOLOGY

## 2019-10-15 RX ORDER — ACETAMINOPHEN 500 MG
1000 TABLET ORAL ONCE
Status: DISCONTINUED | OUTPATIENT
Start: 2019-10-15 | End: 2019-10-15 | Stop reason: HOSPADM

## 2019-10-15 NOTE — TELEPHONE ENCOUNTER
"----- Message from Abdiel Brothers sent at 10/15/2019  9:36 AM EDT -----  Regarding: Hx of preeclampsia  Contact: [phone]  Patient called talked to Dr. Romero' office first, however they don't want to see her, was diagnosed with preeclampsia last preg at 30 1/7 wks, starting to have headaches, pain in right side near ribs, yellowish stools, foaming urine, just concerned since some of same symptoms as last time.  Wanted to discuss with Dr. Young if possible.    Spoke with patient. She reports headache and RUQ with somewhat elevated BP. She was offered an appointment with Dr. Romero today and declined; states she didn't understand the nature of their offer. Advised her that Dr. Young states she needs to be evaluated today, either in Dr. Romero\" office or in L&D. She v/u and agrees to contact his office now.   "

## 2019-10-15 NOTE — TELEPHONE ENCOUNTER
Called pt; advised. Offered pt appt but she stated she would wait til her appt next week on 10/24, advised pt if symptoms were to worsen or her BP were to change, to call the office. Pt verbalized understanding

## 2019-10-15 NOTE — TELEPHONE ENCOUNTER
At that point with those level of blood pressures I do not think she has preeclampsia at this time.  If however she continues to have headache or blood pressure is elevated we should see her in the office to evaluate.

## 2019-10-15 NOTE — TELEPHONE ENCOUNTER
maría elenalinda    Ob pt 30wks, started having headaches 4 days ago, then foamy urine, and mucusy yellow stool. She Had this in previous pregnancy which lead to pre-eclampsia. Blood pressure has been elevated at night. It was 124/89 last night. Some pain in right side. She wants to catch it before it leads to pre-eclampsia.

## 2019-10-24 ENCOUNTER — ROUTINE PRENATAL (OUTPATIENT)
Dept: OBSTETRICS AND GYNECOLOGY | Facility: CLINIC | Age: 24
End: 2019-10-24

## 2019-10-24 VITALS — BODY MASS INDEX: 35.3 KG/M2 | WEIGHT: 193 LBS | SYSTOLIC BLOOD PRESSURE: 116 MMHG | DIASTOLIC BLOOD PRESSURE: 70 MMHG

## 2019-10-24 DIAGNOSIS — O34.219 PREVIOUS CESAREAN DELIVERY AFFECTING PREGNANCY: ICD-10-CM

## 2019-10-24 DIAGNOSIS — O09.299 HX OF PREECLAMPSIA, PRIOR PREGNANCY, CURRENTLY PREGNANT: ICD-10-CM

## 2019-10-24 DIAGNOSIS — O26.893 HEARTBURN DURING PREGNANCY, THIRD TRIMESTER: ICD-10-CM

## 2019-10-24 DIAGNOSIS — O45.92: ICD-10-CM

## 2019-10-24 DIAGNOSIS — Z82.79 FAMILY HISTORY OF CONGENITAL HEART DEFECT: ICD-10-CM

## 2019-10-24 DIAGNOSIS — O34.219 DECLINES VBAC (VAGINAL BIRTH AFTER CESAREAN) TRIAL: ICD-10-CM

## 2019-10-24 DIAGNOSIS — R12 HEARTBURN DURING PREGNANCY, THIRD TRIMESTER: ICD-10-CM

## 2019-10-24 DIAGNOSIS — Z30.2 REQUEST FOR STERILIZATION: ICD-10-CM

## 2019-10-24 DIAGNOSIS — O09.93 HIGH-RISK PREGNANCY IN THIRD TRIMESTER: Primary | ICD-10-CM

## 2019-10-24 PROCEDURE — 0502F SUBSEQUENT PRENATAL CARE: CPT | Performed by: OBSTETRICS & GYNECOLOGY

## 2019-10-24 RX ORDER — SODIUM CHLORIDE 0.9 % (FLUSH) 0.9 %
1-10 SYRINGE (ML) INJECTION AS NEEDED
Status: CANCELLED | OUTPATIENT
Start: 2019-10-24

## 2019-10-24 RX ORDER — PROMETHAZINE HYDROCHLORIDE 25 MG/1
12.5 SUPPOSITORY RECTAL EVERY 6 HOURS PRN
Status: CANCELLED | OUTPATIENT
Start: 2019-10-24 | End: 2019-10-26

## 2019-10-24 RX ORDER — METHYLERGONOVINE MALEATE 0.2 MG/ML
200 INJECTION INTRAVENOUS ONCE AS NEEDED
Status: CANCELLED | OUTPATIENT
Start: 2019-10-24 | End: 2019-10-25

## 2019-10-24 RX ORDER — PROMETHAZINE HYDROCHLORIDE 25 MG/1
12.5 TABLET ORAL EVERY 6 HOURS PRN
Status: CANCELLED | OUTPATIENT
Start: 2019-10-24 | End: 2019-10-26

## 2019-10-24 RX ORDER — LIDOCAINE HYDROCHLORIDE 10 MG/ML
5 INJECTION, SOLUTION EPIDURAL; INFILTRATION; INTRACAUDAL; PERINEURAL AS NEEDED
Status: CANCELLED | OUTPATIENT
Start: 2019-10-24

## 2019-10-24 RX ORDER — PROMETHAZINE HYDROCHLORIDE 25 MG/ML
12.5 INJECTION, SOLUTION INTRAMUSCULAR; INTRAVENOUS EVERY 6 HOURS PRN
Status: CANCELLED | OUTPATIENT
Start: 2019-10-24 | End: 2019-10-26

## 2019-10-24 RX ORDER — CARBOPROST TROMETHAMINE 250 UG/ML
250 INJECTION, SOLUTION INTRAMUSCULAR AS NEEDED
Status: CANCELLED | OUTPATIENT
Start: 2019-10-24

## 2019-10-24 RX ORDER — SODIUM CHLORIDE 0.9 % (FLUSH) 0.9 %
3 SYRINGE (ML) INJECTION EVERY 12 HOURS SCHEDULED
Status: CANCELLED | OUTPATIENT
Start: 2019-10-24

## 2019-10-24 RX ORDER — MISOPROSTOL 100 UG/1
800 TABLET ORAL AS NEEDED
Status: CANCELLED | OUTPATIENT
Start: 2019-10-24

## 2019-10-24 NOTE — PROGRESS NOTES
Chief Complaint   Patient presents with   • Routine Prenatal Visit       HPI: Tatianna is a  currently at 31w1d who today reports the following:  Contractions - No; Leaking - No; Vaginal bleeding -  No; Swelling of extremities - No.  Has noticed episodes of periodic heart racing.  Heartburn is overall been well controlled    ROS:  GI: Nausea - No; Constipation - No; Diarrhea - No    Neuro: Headache - No; Visual change - No      EXAM:  Vitals: See prenatal flowsheet   Abdomen: See prenatal flowsheet   Urine glucose/protein: See prenatal flowsheet   Pelvic: See prenatal flowsheet   MDM:   Impression: 1. Supervision of high risk pregnancy  2. Previous C/S with planned repeat C/S   3. Intermittent tachycardia  4. History of preeclampsia no evidence of recurrence.  Currently on baby aspirin   Tests done today: 1. none   Topics discussed: 1. Continue with PNV's  2. Prenatal labs reviewed  3. If tachycardic symptoms persist she will let me know in which point and thyroid studies and Holter will be ordered   Tests scheduled today for her next visit:   none

## 2019-10-28 ENCOUNTER — TELEPHONE (OUTPATIENT)
Dept: OBSTETRICS AND GYNECOLOGY | Facility: CLINIC | Age: 24
End: 2019-10-28

## 2019-10-28 NOTE — TELEPHONE ENCOUNTER
Looking at the notes from [facility] scan on 9/3 - they only recommended to come back as needed.  Usually if they want to repeat the scan at 32 weeks, they would have said so the time of the last ultrasound.

## 2019-10-28 NOTE — TELEPHONE ENCOUNTER
Dr. 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 Per patient FOB's first son passed away due to this heart defect.

## 2019-10-31 ENCOUNTER — HOSPITAL ENCOUNTER (EMERGENCY)
Facility: HOSPITAL | Age: 24
Discharge: HOME | End: 2019-10-31
Attending: EMERGENCY MEDICINE | Admitting: EMERGENCY MEDICINE

## 2019-10-31 ENCOUNTER — TELEPHONE (OUTPATIENT)
Dept: OBSTETRICS AND GYNECOLOGY | Facility: CLINIC | Age: 24
End: 2019-10-31

## 2019-10-31 ENCOUNTER — APPOINTMENT (OUTPATIENT)
Dept: GENERAL RADIOLOGY | Facility: HOSPITAL | Age: 24
End: 2019-10-31

## 2019-10-31 VITALS
OXYGEN SATURATION: 97 % | BODY MASS INDEX: 35.51 KG/M2 | HEART RATE: 77 BPM | HEIGHT: 62 IN | TEMPERATURE: 97.9 F | SYSTOLIC BLOOD PRESSURE: 100 MMHG | DIASTOLIC BLOOD PRESSURE: 68 MMHG | RESPIRATION RATE: 18 BRPM | WEIGHT: 193 LBS

## 2019-10-31 DIAGNOSIS — R00.2 HEART PALPITATIONS: Primary | ICD-10-CM

## 2019-10-31 DIAGNOSIS — Z34.93 THIRD TRIMESTER PREGNANCY: ICD-10-CM

## 2019-10-31 DIAGNOSIS — F41.1 ANXIETY REACTION: ICD-10-CM

## 2019-10-31 LAB
ALBUMIN SERPL-MCNC: 3.8 G/DL (ref 3.5–5.2)
ALBUMIN/GLOB SERPL: 1.1 G/DL
ALP SERPL-CCNC: 134 U/L (ref 39–117)
ALT SERPL W P-5'-P-CCNC: 11 U/L (ref 1–33)
ANION GAP SERPL CALCULATED.3IONS-SCNC: 12 MMOL/L (ref 5–15)
AST SERPL-CCNC: 14 U/L (ref 1–32)
BASOPHILS # BLD AUTO: 0.02 10*3/MM3 (ref 0–0.2)
BASOPHILS NFR BLD AUTO: 0.2 % (ref 0–1.5)
BILIRUB SERPL-MCNC: 0.4 MG/DL (ref 0.2–1.2)
BILIRUB UR QL STRIP: NEGATIVE
BUN BLD-MCNC: 8 MG/DL (ref 6–20)
BUN/CREAT SERPL: 10.4 (ref 7–25)
CALCIUM SPEC-SCNC: 9.2 MG/DL (ref 8.6–10.5)
CHLORIDE SERPL-SCNC: 102 MMOL/L (ref 98–107)
CLARITY UR: CLEAR
CO2 SERPL-SCNC: 24 MMOL/L (ref 22–29)
COLOR UR: YELLOW
CREAT BLD-MCNC: 0.77 MG/DL (ref 0.57–1)
DEPRECATED RDW RBC AUTO: 37.3 FL (ref 37–54)
EOSINOPHIL # BLD AUTO: 0.05 10*3/MM3 (ref 0–0.4)
EOSINOPHIL NFR BLD AUTO: 0.5 % (ref 0.3–6.2)
ERYTHROCYTE [DISTWIDTH] IN BLOOD BY AUTOMATED COUNT: 11.7 % (ref 12.3–15.4)
GFR SERPL CREATININE-BSD FRML MDRD: 91 ML/MIN/1.73
GLOBULIN UR ELPH-MCNC: 3.5 GM/DL
GLUCOSE BLD-MCNC: 79 MG/DL (ref 65–99)
GLUCOSE UR STRIP-MCNC: NEGATIVE MG/DL
HCT VFR BLD AUTO: 34.4 % (ref 34–46.6)
HGB BLD-MCNC: 11.1 G/DL (ref 12–15.9)
HGB UR QL STRIP.AUTO: NEGATIVE
HOLD SPECIMEN: NORMAL
HOLD SPECIMEN: NORMAL
IMM GRANULOCYTES # BLD AUTO: 0.06 10*3/MM3 (ref 0–0.05)
IMM GRANULOCYTES NFR BLD AUTO: 0.6 % (ref 0–0.5)
KETONES UR QL STRIP: NEGATIVE
LEUKOCYTE ESTERASE UR QL STRIP.AUTO: NEGATIVE
LIPASE SERPL-CCNC: 25 U/L (ref 13–60)
LYMPHOCYTES # BLD AUTO: 1.87 10*3/MM3 (ref 0.7–3.1)
LYMPHOCYTES NFR BLD AUTO: 19.3 % (ref 19.6–45.3)
MAGNESIUM SERPL-MCNC: 1.9 MG/DL (ref 1.6–2.6)
MCH RBC QN AUTO: 28.8 PG (ref 26.6–33)
MCHC RBC AUTO-ENTMCNC: 32.3 G/DL (ref 31.5–35.7)
MCV RBC AUTO: 89.1 FL (ref 79–97)
MONOCYTES # BLD AUTO: 0.76 10*3/MM3 (ref 0.1–0.9)
MONOCYTES NFR BLD AUTO: 7.9 % (ref 5–12)
NEUTROPHILS # BLD AUTO: 6.92 10*3/MM3 (ref 1.7–7)
NEUTROPHILS NFR BLD AUTO: 71.5 % (ref 42.7–76)
NITRITE UR QL STRIP: NEGATIVE
NRBC BLD AUTO-RTO: 0 /100 WBC (ref 0–0.2)
NT-PROBNP SERPL-MCNC: 8.8 PG/ML (ref 5–450)
PH UR STRIP.AUTO: 6 [PH] (ref 5–8)
PLATELET # BLD AUTO: 223 10*3/MM3 (ref 140–450)
PMV BLD AUTO: 11.7 FL (ref 6–12)
POTASSIUM BLD-SCNC: 3.8 MMOL/L (ref 3.5–5.2)
PROT SERPL-MCNC: 7.3 G/DL (ref 6–8.5)
PROT UR QL STRIP: NEGATIVE
RBC # BLD AUTO: 3.86 10*6/MM3 (ref 3.77–5.28)
SODIUM BLD-SCNC: 138 MMOL/L (ref 136–145)
SP GR UR STRIP: 1.01 (ref 1–1.03)
T4 FREE SERPL-MCNC: 0.99 NG/DL (ref 0.93–1.7)
TROPONIN T SERPL-MCNC: <0.01 NG/ML (ref 0–0.03)
TSH SERPL DL<=0.05 MIU/L-ACNC: 5.29 UIU/ML (ref 0.27–4.2)
UROBILINOGEN UR QL STRIP: NORMAL
WBC NRBC COR # BLD: 9.68 10*3/MM3 (ref 3.4–10.8)
WHOLE BLOOD HOLD SPECIMEN: NORMAL
WHOLE BLOOD HOLD SPECIMEN: NORMAL

## 2019-10-31 PROCEDURE — 93005 ELECTROCARDIOGRAM TRACING: CPT | Performed by: EMERGENCY MEDICINE

## 2019-10-31 PROCEDURE — 85025 COMPLETE CBC W/AUTO DIFF WBC: CPT | Performed by: EMERGENCY MEDICINE

## 2019-10-31 PROCEDURE — 84439 ASSAY OF FREE THYROXINE: CPT | Performed by: EMERGENCY MEDICINE

## 2019-10-31 PROCEDURE — 84443 ASSAY THYROID STIM HORMONE: CPT | Performed by: EMERGENCY MEDICINE

## 2019-10-31 PROCEDURE — 84484 ASSAY OF TROPONIN QUANT: CPT | Performed by: EMERGENCY MEDICINE

## 2019-10-31 PROCEDURE — 83735 ASSAY OF MAGNESIUM: CPT | Performed by: EMERGENCY MEDICINE

## 2019-10-31 PROCEDURE — 83690 ASSAY OF LIPASE: CPT | Performed by: EMERGENCY MEDICINE

## 2019-10-31 PROCEDURE — 83880 ASSAY OF NATRIURETIC PEPTIDE: CPT | Performed by: EMERGENCY MEDICINE

## 2019-10-31 PROCEDURE — 81003 URINALYSIS AUTO W/O SCOPE: CPT | Performed by: EMERGENCY MEDICINE

## 2019-10-31 PROCEDURE — 99284 EMERGENCY DEPT VISIT MOD MDM: CPT

## 2019-10-31 PROCEDURE — 80053 COMPREHEN METABOLIC PANEL: CPT | Performed by: EMERGENCY MEDICINE

## 2019-10-31 RX ORDER — SODIUM CHLORIDE 0.9 % (FLUSH) 0.9 %
10 SYRINGE (ML) INJECTION AS NEEDED
Status: DISCONTINUED | OUTPATIENT
Start: 2019-10-31 | End: 2019-10-31 | Stop reason: HOSPADM

## 2019-10-31 NOTE — TELEPHONE ENCOUNTER
"Tatianna calls office c/o increased -145 bpm occurring nearly every hour. Reports feeling lightheaded and \"like I might pass out\". Advised pt to present to  for evaluation. Pt verbalizes   "

## 2019-11-02 ENCOUNTER — HOSPITAL ENCOUNTER (EMERGENCY)
Facility: HOSPITAL | Age: 24
Discharge: HOME | End: 2019-11-02
Attending: EMERGENCY MEDICINE | Admitting: EMERGENCY MEDICINE

## 2019-11-02 VITALS
TEMPERATURE: 98.6 F | BODY MASS INDEX: 35.51 KG/M2 | WEIGHT: 193 LBS | HEART RATE: 90 BPM | RESPIRATION RATE: 16 BRPM | SYSTOLIC BLOOD PRESSURE: 99 MMHG | OXYGEN SATURATION: 97 % | HEIGHT: 62 IN | DIASTOLIC BLOOD PRESSURE: 57 MMHG

## 2019-11-02 DIAGNOSIS — G44.209 ACUTE NON INTRACTABLE TENSION-TYPE HEADACHE: ICD-10-CM

## 2019-11-02 DIAGNOSIS — B34.9 VIRAL SYNDROME: Primary | ICD-10-CM

## 2019-11-02 LAB
ALBUMIN SERPL-MCNC: 3.7 G/DL (ref 3.5–5.2)
ALBUMIN/GLOB SERPL: 1.1 G/DL
ALP SERPL-CCNC: 137 U/L (ref 39–117)
ALT SERPL W P-5'-P-CCNC: 12 U/L (ref 1–33)
ANION GAP SERPL CALCULATED.3IONS-SCNC: 12 MMOL/L (ref 5–15)
AST SERPL-CCNC: 14 U/L (ref 1–32)
B PARAPERT DNA SPEC QL NAA+PROBE: NOT DETECTED
B PERT DNA SPEC QL NAA+PROBE: NOT DETECTED
BASOPHILS # BLD AUTO: 0.02 10*3/MM3 (ref 0–0.2)
BASOPHILS NFR BLD AUTO: 0.2 % (ref 0–1.5)
BILIRUB SERPL-MCNC: 0.6 MG/DL (ref 0.2–1.2)
BUN BLD-MCNC: 7 MG/DL (ref 6–20)
BUN/CREAT SERPL: 9.2 (ref 7–25)
C PNEUM DNA NPH QL NAA+NON-PROBE: NOT DETECTED
CALCIUM SPEC-SCNC: 8.9 MG/DL (ref 8.6–10.5)
CHLORIDE SERPL-SCNC: 103 MMOL/L (ref 98–107)
CO2 SERPL-SCNC: 21 MMOL/L (ref 22–29)
CREAT BLD-MCNC: 0.76 MG/DL (ref 0.57–1)
DEPRECATED RDW RBC AUTO: 37.3 FL (ref 37–54)
EOSINOPHIL # BLD AUTO: 0.03 10*3/MM3 (ref 0–0.4)
EOSINOPHIL NFR BLD AUTO: 0.2 % (ref 0.3–6.2)
ERYTHROCYTE [DISTWIDTH] IN BLOOD BY AUTOMATED COUNT: 11.7 % (ref 12.3–15.4)
FLUAV H1 2009 PAND RNA NPH QL NAA+PROBE: NOT DETECTED
FLUAV H1 HA GENE NPH QL NAA+PROBE: NOT DETECTED
FLUAV H3 RNA NPH QL NAA+PROBE: NOT DETECTED
FLUAV SUBTYP SPEC NAA+PROBE: NOT DETECTED
FLUBV RNA ISLT QL NAA+PROBE: NOT DETECTED
GFR SERPL CREATININE-BSD FRML MDRD: 93 ML/MIN/1.73
GLOBULIN UR ELPH-MCNC: 3.4 GM/DL
GLUCOSE BLD-MCNC: 119 MG/DL (ref 65–99)
HADV DNA SPEC NAA+PROBE: NOT DETECTED
HCOV 229E RNA SPEC QL NAA+PROBE: NOT DETECTED
HCOV HKU1 RNA SPEC QL NAA+PROBE: NOT DETECTED
HCOV NL63 RNA SPEC QL NAA+PROBE: NOT DETECTED
HCOV OC43 RNA SPEC QL NAA+PROBE: NOT DETECTED
HCT VFR BLD AUTO: 33.2 % (ref 34–46.6)
HGB BLD-MCNC: 10.8 G/DL (ref 12–15.9)
HMPV RNA NPH QL NAA+NON-PROBE: NOT DETECTED
HOLD SPECIMEN: NORMAL
HOLD SPECIMEN: NORMAL
HPIV1 RNA SPEC QL NAA+PROBE: NOT DETECTED
HPIV2 RNA SPEC QL NAA+PROBE: NOT DETECTED
HPIV3 RNA NPH QL NAA+PROBE: NOT DETECTED
HPIV4 P GENE NPH QL NAA+PROBE: NOT DETECTED
IMM GRANULOCYTES # BLD AUTO: 0.09 10*3/MM3 (ref 0–0.05)
IMM GRANULOCYTES NFR BLD AUTO: 0.7 % (ref 0–0.5)
LYMPHOCYTES # BLD AUTO: 1.45 10*3/MM3 (ref 0.7–3.1)
LYMPHOCYTES NFR BLD AUTO: 11.9 % (ref 19.6–45.3)
M PNEUMO IGG SER IA-ACNC: NOT DETECTED
MCH RBC QN AUTO: 29 PG (ref 26.6–33)
MCHC RBC AUTO-ENTMCNC: 32.5 G/DL (ref 31.5–35.7)
MCV RBC AUTO: 89 FL (ref 79–97)
MONOCYTES # BLD AUTO: 0.66 10*3/MM3 (ref 0.1–0.9)
MONOCYTES NFR BLD AUTO: 5.4 % (ref 5–12)
NEUTROPHILS # BLD AUTO: 9.92 10*3/MM3 (ref 1.7–7)
NEUTROPHILS NFR BLD AUTO: 81.6 % (ref 42.7–76)
NRBC BLD AUTO-RTO: 0 /100 WBC (ref 0–0.2)
PLATELET # BLD AUTO: 206 10*3/MM3 (ref 140–450)
PMV BLD AUTO: 12.1 FL (ref 6–12)
POTASSIUM BLD-SCNC: 3.7 MMOL/L (ref 3.5–5.2)
PROT SERPL-MCNC: 7.1 G/DL (ref 6–8.5)
RBC # BLD AUTO: 3.73 10*6/MM3 (ref 3.77–5.28)
RHINOVIRUS RNA SPEC NAA+PROBE: NOT DETECTED
RSV RNA NPH QL NAA+NON-PROBE: NOT DETECTED
S PYO AG THROAT QL: NEGATIVE
SODIUM BLD-SCNC: 136 MMOL/L (ref 136–145)
WBC NRBC COR # BLD: 12.17 10*3/MM3 (ref 3.4–10.8)
WHOLE BLOOD HOLD SPECIMEN: NORMAL
WHOLE BLOOD HOLD SPECIMEN: NORMAL

## 2019-11-02 PROCEDURE — 99285 EMERGENCY DEPT VISIT HI MDM: CPT

## 2019-11-02 PROCEDURE — 87081 CULTURE SCREEN ONLY: CPT | Performed by: PHYSICIAN ASSISTANT

## 2019-11-02 PROCEDURE — 87880 STREP A ASSAY W/OPTIC: CPT | Performed by: PHYSICIAN ASSISTANT

## 2019-11-02 PROCEDURE — 99284 EMERGENCY DEPT VISIT MOD MDM: CPT

## 2019-11-02 PROCEDURE — 80053 COMPREHEN METABOLIC PANEL: CPT | Performed by: PHYSICIAN ASSISTANT

## 2019-11-02 PROCEDURE — 85025 COMPLETE CBC W/AUTO DIFF WBC: CPT | Performed by: PHYSICIAN ASSISTANT

## 2019-11-02 PROCEDURE — 0100U HC BIOFIRE FILMARRAY RESP PANEL 2: CPT | Performed by: PHYSICIAN ASSISTANT

## 2019-11-02 RX ORDER — SODIUM CHLORIDE 0.9 % (FLUSH) 0.9 %
10 SYRINGE (ML) INJECTION AS NEEDED
Status: DISCONTINUED | OUTPATIENT
Start: 2019-11-02 | End: 2019-11-02 | Stop reason: HOSPADM

## 2019-11-02 RX ORDER — ACETAMINOPHEN 500 MG
1000 TABLET ORAL ONCE
Status: COMPLETED | OUTPATIENT
Start: 2019-11-02 | End: 2019-11-02

## 2019-11-02 RX ADMIN — ACETAMINOPHEN 1000 MG: 500 TABLET, FILM COATED ORAL at 14:12

## 2019-11-02 RX ADMIN — SODIUM CHLORIDE 1000 ML: 9 INJECTION, SOLUTION INTRAVENOUS at 14:13

## 2019-11-02 NOTE — ED PROVIDER NOTES
Subjective   Tatianna Diane is a 23 year old female presenting to the ED today with complaints of headache. She is currently 32 weeks pregnant. She reports last night she began experiencing a sore throat and abdominal cramping. This morning she awoke with a headache, fever, nausea, congestion, jaw tightness, and neck stiffness. She states her head pain is at the back of her head and radiates to the sides of her head. She denies cough, rhinorrhea, vomiting, or vaginal bleeding. Due to all of her symptoms [facility]. [facility] performed influenza and strep tests both of which resulted negative, however they recommended she come to the ED to check for possible meningitis. She has a history of migraines and scoliosis. She denies recently being around any sick people. During her first pregnancy she underwent a placental abruption at 22 weeks along. She was recently at [facility] ED due to heart palpitations and had a Ziopatch placed. Her OBGYN is Dr. Romero. There are no other acute complaints at this time.            History provided by:  Patient  Headache   Location: posterior.  Radiates to: sides of her head.  Onset quality:  Sudden  Timing:  Constant  Progression:  Worsening  Chronicity:  New  Associated symptoms: abdominal pain (cramping), congestion, fever, nausea and sore throat    Associated symptoms: no cough        Review of Systems   Constitutional: Positive for fever.   HENT: Positive for congestion and sore throat.         Positive for jaw tightness.   Respiratory: Negative for cough.    Gastrointestinal: Positive for abdominal pain (cramping) and nausea.   Genitourinary: Negative for vaginal bleeding.   Musculoskeletal:        Positive for neck stiffness.   Neurological: Positive for headaches.   All other systems reviewed and are negative.      Past Medical History:   Diagnosis Date   • Attention deficit hyperactivity disorder (ADHD), combined type 2012    Off meds  after 1-2 years   • History of scarlet fever    • Hypothyroid 2006    Resolved after ~ 3 years of Rxs   • Influenza A 2019   • Mirena 2018    Placed on 2018 - came out with tampon   • Pelvic adhesive disease 2016   • Postpartum thyroiditis 2018       Allergies   Allergen Reactions   • Tamiflu  [Oseltamivir Phosphate] GI Intolerance       Past Surgical History:   Procedure Laterality Date   •  SECTION N/A 4/10/2018    Procedure: Primary LTCS (1 layer closure)  Surgeon: Jamal Almeida MD;     • HAND DEBRIDEMENT Right 2019    After a dog bite   • LAPAROSCOPIC APPENDECTOMY     • LAPAROSCOPIC LYSIS OF ADHESIONS  2016    Findings consistent with chronic PID   • NASAL SEPTUM SURGERY     • NC CATH/INJECT HYSTEROSALPINGOGRAM  2017     Uterus normal; right adnexa normal; left tube did not demonstrate fill or spill   • TONSILLECTOMY AND ADENOIDECTOMY         Family History   Problem Relation Age of Onset   • Coronary artery disease Paternal Grandmother    • Diabetes Paternal Grandmother    • Hypertension Paternal Grandmother        Social History     Socioeconomic History   • Marital status:      Spouse name: Not on file   • Number of children: Not on file   • Years of education: Not on file   • Highest education level: Not on file   Tobacco Use   • Smoking status: Former Smoker     Packs/day: 0.25     Types: Cigarettes     Start date:      Last attempt to quit: 2017     Years since quittin.5   • Smokeless tobacco: Never Used   Substance and Sexual Activity   • Alcohol use: No   • Drug use: No   • Sexual activity: Yes   Social History Narrative    ** Merged History Encounter **              Objective   Physical Exam   Constitutional: She is oriented to person, place, and time. She appears well-developed and well-nourished. No distress.   HENT:   Head: Normocephalic and atraumatic.   Right Ear: Tympanic membrane normal.   Left Ear: Tympanic  membrane normal.   Mouth/Throat: Uvula is midline and oropharynx is clear and moist. No oropharyngeal exudate or posterior oropharyngeal erythema.   Cervical lymphadenopathy of right anterior chamber. No nuchal rigidity.   Eyes: Conjunctivae are normal. No scleral icterus.   Neck: Normal range of motion. Neck supple.   Cardiovascular: Normal rate, regular rhythm and normal heart sounds.   No murmur heard.  Pulmonary/Chest: Effort normal and breath sounds normal. No respiratory distress.   Abdominal: Soft. There is no tenderness.    with fundus palpable at epigastrium with no tenderness.    Musculoskeletal: Normal range of motion.   Trace pedal edema.    Neurological: She is alert and oriented to person, place, and time.   Skin: Skin is warm and dry.   Psychiatric: She has a normal mood and affect. Her behavior is normal.   Nursing note and vitals reviewed.      Procedures         ED Course  ED Course as of 2235   Sat 2019   1632 Patient is seen and evaluated by me in coordination with Shamir Perez PA-C.  I discussed findings with the patient.  Her white count is mildly elevated.  She is afebrile.  She is neurologically intact.  She has some neck pain with movement but no specific meningismus.  She has pharyngeal symptoms without signs of acute streptococcal pharyngitis with a negative strep screen.  Discussed risks and benefits of CT scanning and lumbar puncture with the patient to her satisfaction.  In view of her symptoms now, pregnancy, and risks as well as benefit after waiting this the patient has declined CT scanning and declined LP.  I have discussed the possibility of meningitis but that her symptoms are not classic for this.  I discussed early return if she does not have progressive improvement of his symptoms, any decline, or any neurologic symptoms.  Patient is very reliable, very intelligent, and verbalized understanding agreement of the discussion, risk-benefit, and indications  for return  []   1819 Patient is reevaluated.  She is sitting upright and looks much improved.  Her neck is freely mobile.  She has no meningismus.  I discussed the findings follow-up and indications for immediate return.  Very pleasant bright and reliable patient and spouse verbalized understanding agreement plan of care, need for follow-up, and indications for immediate return.  [HH]      ED Course User Index  [] Gabriel Campo MD     Recent Results (from the past 24 hour(s))   Light Blue Top    Collection Time: 11/02/19  1:33 PM   Result Value Ref Range    Extra Tube hold for add-on    Green Top (Gel)    Collection Time: 11/02/19  1:33 PM   Result Value Ref Range    Extra Tube Hold for add-ons.    Lavender Top    Collection Time: 11/02/19  1:33 PM   Result Value Ref Range    Extra Tube hold for add-on    Gold Top - SST    Collection Time: 11/02/19  1:33 PM   Result Value Ref Range    Extra Tube Hold for add-ons.    Comprehensive Metabolic Panel    Collection Time: 11/02/19  1:33 PM   Result Value Ref Range    Glucose 119 (H) 65 - 99 mg/dL    BUN 7 6 - 20 mg/dL    Creatinine 0.76 0.57 - 1.00 mg/dL    Sodium 136 136 - 145 mmol/L    Potassium 3.7 3.5 - 5.2 mmol/L    Chloride 103 98 - 107 mmol/L    CO2 21.0 (L) 22.0 - 29.0 mmol/L    Calcium 8.9 8.6 - 10.5 mg/dL    Total Protein 7.1 6.0 - 8.5 g/dL    Albumin 3.70 3.50 - 5.20 g/dL    ALT (SGPT) 12 1 - 33 U/L    AST (SGOT) 14 1 - 32 U/L    Alkaline Phosphatase 137 (H) 39 - 117 U/L    Total Bilirubin 0.6 0.2 - 1.2 mg/dL    eGFR Non African Amer 93 >60 mL/min/1.73    Globulin 3.4 gm/dL    A/G Ratio 1.1 g/dL    BUN/Creatinine Ratio 9.2 7.0 - 25.0    Anion Gap 12.0 5.0 - 15.0 mmol/L   CBC Auto Differential    Collection Time: 11/02/19  1:33 PM   Result Value Ref Range    WBC 12.17 (H) 3.40 - 10.80 10*3/mm3    RBC 3.73 (L) 3.77 - 5.28 10*6/mm3    Hemoglobin 10.8 (L) 12.0 - 15.9 g/dL    Hematocrit 33.2 (L) 34.0 - 46.6 %    MCV 89.0 79.0 - 97.0 fL    MCH 29.0 26.6 - 33.0  pg    MCHC 32.5 31.5 - 35.7 g/dL    RDW 11.7 (L) 12.3 - 15.4 %    RDW-SD 37.3 37.0 - 54.0 fl    MPV 12.1 (H) 6.0 - 12.0 fL    Platelets 206 140 - 450 10*3/mm3    Neutrophil % 81.6 (H) 42.7 - 76.0 %    Lymphocyte % 11.9 (L) 19.6 - 45.3 %    Monocyte % 5.4 5.0 - 12.0 %    Eosinophil % 0.2 (L) 0.3 - 6.2 %    Basophil % 0.2 0.0 - 1.5 %    Immature Grans % 0.7 (H) 0.0 - 0.5 %    Neutrophils, Absolute 9.92 (H) 1.70 - 7.00 10*3/mm3    Lymphocytes, Absolute 1.45 0.70 - 3.10 10*3/mm3    Monocytes, Absolute 0.66 0.10 - 0.90 10*3/mm3    Eosinophils, Absolute 0.03 0.00 - 0.40 10*3/mm3    Basophils, Absolute 0.02 0.00 - 0.20 10*3/mm3    Immature Grans, Absolute 0.09 (H) 0.00 - 0.05 10*3/mm3    nRBC 0.0 0.0 - 0.2 /100 WBC   Rapid Strep A Screen - Swab, Throat    Collection Time: 11/02/19  3:30 PM   Result Value Ref Range    Strep A Ag Negative Negative   Respiratory Panel, PCR - Swab, Nasopharynx    Collection Time: 11/02/19  3:30 PM   Result Value Ref Range    ADENOVIRUS, PCR Not Detected Not Detected    Coronavirus 229E Not Detected Not Detected    Coronavirus HKU1 Not Detected Not Detected    Coronavirus NL63 Not Detected Not Detected    Coronavirus OC43 Not Detected Not Detected    Human Metapneumovirus Not Detected Not Detected    Human Rhinovirus/Enterovirus Not Detected Not Detected    Influenza B PCR Not Detected Not Detected    Parainfluenza Virus 1 Not Detected Not Detected    Parainfluenza Virus 2 Not Detected Not Detected    Parainfluenza Virus 3 Not Detected Not Detected    Parainfluenza Virus 4 Not Detected Not Detected    Bordetella pertussis pcr Not Detected Not Detected    Influenza A H1 2009 PCR Not Detected Not Detected    Chlamydophila pneumoniae PCR Not Detected Not Detected    Mycoplasma pneumo by PCR Not Detected Not Detected    Influenza A PCR Not Detected Not Detected    Influenza A H3 Not Detected Not Detected    Influenza A H1 Not Detected Not Detected    RSV, PCR Not Detected Not Detected    Bordetella  parapertussis PCR Not Detected Not Detected     Note: In addition to lab results from this visit, the labs listed above may include labs taken at another facility or during a different encounter within the last 24 hours. Please correlate lab times with ED admission and discharge times for further clarification of the services performed during this visit.    No orders to display     Vitals:    11/02/19 1600 11/02/19 1615 11/02/19 1724 11/02/19 1724   BP: 110/65   99/57   BP Location:    Left arm   Patient Position:    Lying   Pulse:  102 90    Resp:       Temp:       TempSrc:       SpO2:  98% 97%    Weight:       Height:         Medications   acetaminophen (TYLENOL) tablet 1,000 mg (1,000 mg Oral Given 11/2/19 1412)   sodium chloride 0.9 % bolus 1,000 mL (0 mL Intravenous Stopped 11/2/19 1530)     ECG/EMG Results (last 24 hours)     ** No results found for the last 24 hours. **        No orders to display                       MDM  Number of Diagnoses or Management Options  Acute non intractable tension-type headache: new and requires workup  Viral syndrome: new and requires workup     Amount and/or Complexity of Data Reviewed  Clinical lab tests: ordered and reviewed  Tests in the medicine section of CPT®: reviewed and ordered  Discuss the patient with other providers: yes    Patient Progress  Patient progress: stable      Final diagnoses:   Viral syndrome   Acute non intractable tension-type headache       Documentation assistance provided by carina Torres.  Information recorded by the scrsergio was done at my direction and has been verified and validated by me.     Chloé Torres  11/02/19 161       David Perez PA  11/02/19 [id number]

## 2019-11-04 LAB — BACTERIA SPEC AEROBE CULT: NORMAL

## 2019-11-07 ENCOUNTER — ROUTINE PRENATAL (OUTPATIENT)
Dept: OBSTETRICS AND GYNECOLOGY | Facility: CLINIC | Age: 24
End: 2019-11-07

## 2019-11-07 VITALS — BODY MASS INDEX: 35.85 KG/M2 | SYSTOLIC BLOOD PRESSURE: 112 MMHG | DIASTOLIC BLOOD PRESSURE: 72 MMHG | WEIGHT: 196 LBS

## 2019-11-07 DIAGNOSIS — O34.219 DECLINES VBAC (VAGINAL BIRTH AFTER CESAREAN) TRIAL: ICD-10-CM

## 2019-11-07 DIAGNOSIS — Z30.2 REQUEST FOR STERILIZATION: ICD-10-CM

## 2019-11-07 DIAGNOSIS — O45.92: ICD-10-CM

## 2019-11-07 DIAGNOSIS — Z82.79 FAMILY HISTORY OF CONGENITAL HEART DEFECT: ICD-10-CM

## 2019-11-07 DIAGNOSIS — O34.219 PREVIOUS CESAREAN DELIVERY AFFECTING PREGNANCY: ICD-10-CM

## 2019-11-07 DIAGNOSIS — O09.93 HIGH-RISK PREGNANCY IN THIRD TRIMESTER: ICD-10-CM

## 2019-11-07 DIAGNOSIS — R12 HEARTBURN DURING PREGNANCY, THIRD TRIMESTER: ICD-10-CM

## 2019-11-07 DIAGNOSIS — O09.299 HX OF PREECLAMPSIA, PRIOR PREGNANCY, CURRENTLY PREGNANT: ICD-10-CM

## 2019-11-07 DIAGNOSIS — O26.893 HEARTBURN DURING PREGNANCY, THIRD TRIMESTER: ICD-10-CM

## 2019-11-07 PROCEDURE — 0502F SUBSEQUENT PRENATAL CARE: CPT | Performed by: OBSTETRICS & GYNECOLOGY

## 2019-11-07 NOTE — PROGRESS NOTES
Chief Complaint   Patient presents with   • Routine Prenatal Visit       HPI: Tatianna is a  currently at 33w1d who today reports the following:  Contractions - NO but cramps when walks; Leaking - sweet smelling fluid vaginally x 3 weeks; Vaginal bleeding -  No; Swelling of extremities - No.    ROS:  GI: Nausea - No; Constipation - No; Diarrhea - No    Neuro: Headache - No; Visual change - No      EXAM:  Vitals: See prenatal flowsheet   Abdomen: See prenatal flowsheet   Urine glucose/protein: See prenatal flowsheet   Pelvic: Pool (-) and pH normal   MDM:   Impression: 1. Supervision of high risk pregnancy  2. Previous C/S with planned repeat C/S   3. H/o PIH in 1st pregnancy - TAD   Tests done today: 1. none   Topics discussed: 1. Continue with PNV's  2. Prenatal labs reviewed  3. none - she had no major complaints,questions or concerns   Tests scheduled today for her next visit:   none

## 2019-11-13 ENCOUNTER — ROUTINE PRENATAL (OUTPATIENT)
Dept: OBSTETRICS AND GYNECOLOGY | Facility: CLINIC | Age: 24
End: 2019-11-13

## 2019-11-13 ENCOUNTER — HOSPITAL ENCOUNTER (OUTPATIENT)
Facility: HOSPITAL | Age: 24
Discharge: HOME | End: 2019-11-14
Attending: OBSTETRICS & GYNECOLOGY | Admitting: OBSTETRICS & GYNECOLOGY

## 2019-11-13 ENCOUNTER — TELEPHONE (OUTPATIENT)
Dept: OBSTETRICS AND GYNECOLOGY | Facility: CLINIC | Age: 24
End: 2019-11-13

## 2019-11-13 VITALS — BODY MASS INDEX: 35.67 KG/M2 | SYSTOLIC BLOOD PRESSURE: 126 MMHG | WEIGHT: 195 LBS | DIASTOLIC BLOOD PRESSURE: 76 MMHG

## 2019-11-13 DIAGNOSIS — O26.893 HEARTBURN DURING PREGNANCY, THIRD TRIMESTER: ICD-10-CM

## 2019-11-13 DIAGNOSIS — O34.219 PREVIOUS CESAREAN DELIVERY AFFECTING PREGNANCY: ICD-10-CM

## 2019-11-13 DIAGNOSIS — Z30.2 REQUEST FOR STERILIZATION: ICD-10-CM

## 2019-11-13 DIAGNOSIS — O09.299 HX OF PREECLAMPSIA, PRIOR PREGNANCY, CURRENTLY PREGNANT: ICD-10-CM

## 2019-11-13 DIAGNOSIS — O09.93 HIGH-RISK PREGNANCY IN THIRD TRIMESTER: ICD-10-CM

## 2019-11-13 DIAGNOSIS — Z82.79 FAMILY HISTORY OF CONGENITAL HEART DEFECT: ICD-10-CM

## 2019-11-13 DIAGNOSIS — O34.219 DECLINES VBAC (VAGINAL BIRTH AFTER CESAREAN) TRIAL: ICD-10-CM

## 2019-11-13 DIAGNOSIS — O46.93 PREGNANCY WITH THIRD TRIMESTER BLEEDING: Primary | ICD-10-CM

## 2019-11-13 DIAGNOSIS — O45.92: ICD-10-CM

## 2019-11-13 DIAGNOSIS — R12 HEARTBURN DURING PREGNANCY, THIRD TRIMESTER: ICD-10-CM

## 2019-11-13 PROBLEM — O47.03 THREATENED PRETERM LABOR, THIRD TRIMESTER: Status: ACTIVE | Noted: 2019-11-13

## 2019-11-13 PROBLEM — Z34.90 PREGNANCY: Status: ACTIVE | Noted: 2019-11-13

## 2019-11-13 LAB
ABO GROUP BLD: NORMAL
ALP SERPL-CCNC: 155 U/L (ref 39–117)
ALT SERPL W P-5'-P-CCNC: <5 U/L (ref 1–33)
AST SERPL-CCNC: 20 U/L (ref 1–32)
BILIRUB SERPL-MCNC: 0.5 MG/DL (ref 0.2–1.2)
BILIRUB UR QL STRIP: NEGATIVE
BLD GP AB SCN SERPL QL: NEGATIVE
CLARITY UR: CLEAR
COLOR UR: YELLOW
CREAT BLD-MCNC: 0.69 MG/DL (ref 0.57–1)
DEPRECATED RDW RBC AUTO: 37.3 FL (ref 37–54)
ERYTHROCYTE [DISTWIDTH] IN BLOOD BY AUTOMATED COUNT: 11.6 % (ref 12.3–15.4)
FIBRINOGEN PPP-MCNC: 460 MG/DL (ref 220–400)
GLUCOSE UR STRIP-MCNC: NEGATIVE MG/DL
HCT VFR BLD AUTO: 34.4 % (ref 34–46.6)
HGB BLD-MCNC: 11 G/DL (ref 12–15.9)
HGB UR QL STRIP.AUTO: NEGATIVE
KETONES UR QL STRIP: ABNORMAL
LDH SERPL-CCNC: 309 U/L (ref 135–214)
LEUKOCYTE ESTERASE UR QL STRIP.AUTO: NEGATIVE
MCH RBC QN AUTO: 28.1 PG (ref 26.6–33)
MCHC RBC AUTO-ENTMCNC: 32 G/DL (ref 31.5–35.7)
MCV RBC AUTO: 87.8 FL (ref 79–97)
NITRITE UR QL STRIP: NEGATIVE
PH UR STRIP.AUTO: 5.5 [PH] (ref 5–8)
PLATELET # BLD AUTO: 225 10*3/MM3 (ref 140–450)
PMV BLD AUTO: 12.1 FL (ref 6–12)
PROT UR QL STRIP: NEGATIVE
RBC # BLD AUTO: 3.92 10*6/MM3 (ref 3.77–5.28)
RH BLD: POSITIVE
SP GR UR STRIP: 1.02 (ref 1–1.03)
T&S EXPIRATION DATE: NORMAL
URATE SERPL-MCNC: 3.7 MG/DL (ref 2.4–5.7)
UROBILINOGEN UR QL STRIP: ABNORMAL
WBC NRBC COR # BLD: 12.49 10*3/MM3 (ref 3.4–10.8)

## 2019-11-13 PROCEDURE — 83615 LACTATE (LD) (LDH) ENZYME: CPT | Performed by: OBSTETRICS & GYNECOLOGY

## 2019-11-13 PROCEDURE — 85027 COMPLETE CBC AUTOMATED: CPT | Performed by: OBSTETRICS & GYNECOLOGY

## 2019-11-13 PROCEDURE — 96375 TX/PRO/DX INJ NEW DRUG ADDON: CPT

## 2019-11-13 PROCEDURE — 25010000002 BETAMETHASONE ACET & SOD PHOS PER 4 MG: Performed by: OBSTETRICS & GYNECOLOGY

## 2019-11-13 PROCEDURE — 82565 ASSAY OF CREATININE: CPT | Performed by: OBSTETRICS & GYNECOLOGY

## 2019-11-13 PROCEDURE — G0463 HOSPITAL OUTPT CLINIC VISIT: HCPCS

## 2019-11-13 PROCEDURE — 84075 ASSAY ALKALINE PHOSPHATASE: CPT | Performed by: OBSTETRICS & GYNECOLOGY

## 2019-11-13 PROCEDURE — 85384 FIBRINOGEN ACTIVITY: CPT | Performed by: OBSTETRICS & GYNECOLOGY

## 2019-11-13 PROCEDURE — 96361 HYDRATE IV INFUSION ADD-ON: CPT

## 2019-11-13 PROCEDURE — 36415 COLL VENOUS BLD VENIPUNCTURE: CPT | Performed by: OBSTETRICS & GYNECOLOGY

## 2019-11-13 PROCEDURE — 86850 RBC ANTIBODY SCREEN: CPT | Performed by: OBSTETRICS & GYNECOLOGY

## 2019-11-13 PROCEDURE — 82247 BILIRUBIN TOTAL: CPT | Performed by: OBSTETRICS & GYNECOLOGY

## 2019-11-13 PROCEDURE — 96372 THER/PROPH/DIAG INJ SC/IM: CPT

## 2019-11-13 PROCEDURE — 86900 BLOOD TYPING SEROLOGIC ABO: CPT | Performed by: OBSTETRICS & GYNECOLOGY

## 2019-11-13 PROCEDURE — 84550 ASSAY OF BLOOD/URIC ACID: CPT | Performed by: OBSTETRICS & GYNECOLOGY

## 2019-11-13 PROCEDURE — 99213 OFFICE O/P EST LOW 20 MIN: CPT | Performed by: OBSTETRICS & GYNECOLOGY

## 2019-11-13 PROCEDURE — 59426 ANTEPARTUM CARE ONLY: CPT | Performed by: OBSTETRICS & GYNECOLOGY

## 2019-11-13 PROCEDURE — 59025 FETAL NON-STRESS TEST: CPT

## 2019-11-13 PROCEDURE — 84460 ALANINE AMINO (ALT) (SGPT): CPT | Performed by: OBSTETRICS & GYNECOLOGY

## 2019-11-13 PROCEDURE — 96374 THER/PROPH/DIAG INJ IV PUSH: CPT

## 2019-11-13 PROCEDURE — 25010000002 MORPHINE PER 10 MG: Performed by: OBSTETRICS & GYNECOLOGY

## 2019-11-13 PROCEDURE — 84450 TRANSFERASE (AST) (SGOT): CPT | Performed by: OBSTETRICS & GYNECOLOGY

## 2019-11-13 PROCEDURE — 25010000002 PROMETHAZINE PER 50 MG: Performed by: OBSTETRICS & GYNECOLOGY

## 2019-11-13 PROCEDURE — 86901 BLOOD TYPING SEROLOGIC RH(D): CPT | Performed by: OBSTETRICS & GYNECOLOGY

## 2019-11-13 PROCEDURE — 81003 URINALYSIS AUTO W/O SCOPE: CPT | Performed by: OBSTETRICS & GYNECOLOGY

## 2019-11-13 RX ORDER — ONDANSETRON 2 MG/ML
4 INJECTION INTRAMUSCULAR; INTRAVENOUS EVERY 8 HOURS PRN
Status: DISCONTINUED | OUTPATIENT
Start: 2019-11-13 | End: 2019-11-14 | Stop reason: HOSPADM

## 2019-11-13 RX ORDER — SODIUM CHLORIDE 0.9 % (FLUSH) 0.9 %
1-10 SYRINGE (ML) INJECTION AS NEEDED
Status: DISCONTINUED | OUTPATIENT
Start: 2019-11-13 | End: 2019-11-14 | Stop reason: HOSPADM

## 2019-11-13 RX ORDER — PROMETHAZINE HYDROCHLORIDE 25 MG/ML
12.5 INJECTION, SOLUTION INTRAMUSCULAR; INTRAVENOUS EVERY 4 HOURS PRN
Status: DISCONTINUED | OUTPATIENT
Start: 2019-11-13 | End: 2019-11-14 | Stop reason: HOSPADM

## 2019-11-13 RX ORDER — BETAMETHASONE SODIUM PHOSPHATE AND BETAMETHASONE ACETATE 3; 3 MG/ML; MG/ML
12 INJECTION, SUSPENSION INTRA-ARTICULAR; INTRALESIONAL; INTRAMUSCULAR; SOFT TISSUE EVERY 24 HOURS
Status: COMPLETED | OUTPATIENT
Start: 2019-11-13 | End: 2019-11-14

## 2019-11-13 RX ORDER — MORPHINE SULFATE 4 MG/ML
4 INJECTION, SOLUTION INTRAMUSCULAR; INTRAVENOUS EVERY 4 HOURS PRN
Status: DISCONTINUED | OUTPATIENT
Start: 2019-11-13 | End: 2019-11-14 | Stop reason: HOSPADM

## 2019-11-13 RX ORDER — SODIUM CHLORIDE 0.9 % (FLUSH) 0.9 %
3 SYRINGE (ML) INJECTION EVERY 12 HOURS SCHEDULED
Status: DISCONTINUED | OUTPATIENT
Start: 2019-11-13 | End: 2019-11-14 | Stop reason: HOSPADM

## 2019-11-13 RX ORDER — SODIUM CHLORIDE, SODIUM LACTATE, POTASSIUM CHLORIDE, CALCIUM CHLORIDE 600; 310; 30; 20 MG/100ML; MG/100ML; MG/100ML; MG/100ML
100 INJECTION, SOLUTION INTRAVENOUS CONTINUOUS
Status: DISCONTINUED | OUTPATIENT
Start: 2019-11-13 | End: 2019-11-14

## 2019-11-13 RX ORDER — SODIUM CHLORIDE, SODIUM LACTATE, POTASSIUM CHLORIDE, CALCIUM CHLORIDE 600; 310; 30; 20 MG/100ML; MG/100ML; MG/100ML; MG/100ML
125 INJECTION, SOLUTION INTRAVENOUS CONTINUOUS
Status: DISCONTINUED | OUTPATIENT
Start: 2019-11-13 | End: 2019-11-14

## 2019-11-13 RX ORDER — SODIUM CHLORIDE 0.9 % (FLUSH) 0.9 %
10 SYRINGE (ML) INJECTION AS NEEDED
Status: DISCONTINUED | OUTPATIENT
Start: 2019-11-13 | End: 2019-11-14 | Stop reason: HOSPADM

## 2019-11-13 RX ORDER — LIDOCAINE HYDROCHLORIDE 10 MG/ML
5 INJECTION, SOLUTION EPIDURAL; INFILTRATION; INTRACAUDAL; PERINEURAL AS NEEDED
Status: DISCONTINUED | OUTPATIENT
Start: 2019-11-13 | End: 2019-11-14 | Stop reason: HOSPADM

## 2019-11-13 RX ADMIN — MORPHINE SULFATE 4 MG: 4 INJECTION, SOLUTION INTRAMUSCULAR; INTRAVENOUS at 21:00

## 2019-11-13 RX ADMIN — PROMETHAZINE HYDROCHLORIDE 12.5 MG: 25 INJECTION INTRAMUSCULAR; INTRAVENOUS at 20:58

## 2019-11-13 RX ADMIN — SODIUM CHLORIDE, POTASSIUM CHLORIDE, SODIUM LACTATE AND CALCIUM CHLORIDE 125 ML/HR: 600; 310; 30; 20 INJECTION, SOLUTION INTRAVENOUS at 20:58

## 2019-11-13 RX ADMIN — BETAMETHASONE SODIUM PHOSPHATE AND BETAMETHASONE ACETATE 12 MG: 3; 3 INJECTION, SUSPENSION INTRA-ARTICULAR; INTRALESIONAL; INTRAMUSCULAR at 21:08

## 2019-11-13 RX ADMIN — SODIUM CHLORIDE, POTASSIUM CHLORIDE, SODIUM LACTATE AND CALCIUM CHLORIDE 999 ML/HR: 600; 310; 30; 20 INJECTION, SOLUTION INTRAVENOUS at 19:25

## 2019-11-13 RX ADMIN — SODIUM CHLORIDE, POTASSIUM CHLORIDE, SODIUM LACTATE AND CALCIUM CHLORIDE 1000 ML: 600; 310; 30; 20 INJECTION, SOLUTION INTRAVENOUS at 18:47

## 2019-11-13 NOTE — TELEPHONE ENCOUNTER
Pt called in c/o light pink bleeding and cramping. Advised we could add pt to Dr. Romero' schedule at 320. Pt was speaking w/Shara up front about appt.

## 2019-11-13 NOTE — PROGRESS NOTES
Chief Complaint   Patient presents with   • Pregnancy Problem     bleeding cramping       HPI: Tatianna is a  currently at 34w0d who today reports the following:  Contractions -starting earlier today has noticed some mild cramps that seem to be progressively getting worse.  There is been some associated light pink spotting.  Was sexually active last evening.    ROS:  GI: Nausea - No; Constipation - No; Diarrhea - No    Neuro: Headache - No; Visual change - No      EXAM:  Vitals: See prenatal flowsheet   Abdomen: See prenatal flowsheet   Urine glucose/protein: See prenatal flowsheet   Pelvic: See prenatal flowsheet   MDM:   Impression: 1. Supervision of high risk pregnancy  2. Previous C/S with planned repeat C/S   3. Spotting.  No sonographic evidence of abruption but cannot exclude.  Spotting may be related to intercourse last evening   Tests done today: 1. U/S S=D with normal DEYSI and normal placenta.  FBM seen   Topics discussed: 1. Continue with PNV's  2. Prenatal labs reviewed  3. Needs to go to labor and delivery for monitoring

## 2019-11-13 NOTE — H&P
\[facility]  Obstetric History and Physical    Referring Provider: Chacorta Romero MD      Chief Complaint   Patient presents with   • Vaginal Bleeding   • Abdominal Cramping       Subjective     Patient is a 23 y.o. female  currently at 34w0d, who was sent from Dr Romero for fetal monitoring.  She presented to the office today with complaint of abdominal cramping and vaginal spotting.  She also reported having sexual intercourse last evening.  Ultrasound performed showed normal DEYSI and normal placenta with biophysical profile 8 out of 8.  She reports normal fetal activity.  Her obstetrical history is significant for previous  delivery at 30 weeks gestation for severe preeclampsia.  She is planned to have repeat .  This current pregnancy she had a subchorionic hemorrhage during midtrimester which required magnesium sulfate tocolysis this and was given a course of steroids for fetal lung maturity enhancement.    Her prenatal care is complicated by  prior   desires repeat .  Her previous obstetric/gynecological history is noted for is remarkable for .    The following portions of the patients history were reviewed and updated as appropriate: current medications, allergies, past medical history, past surgical history, past family history, past social history and problem list .       Prenatal Information:   Maternal Prenatal Labs  Blood Type No results found for: ABO   Rh Status No results found for: RH   Antibody Screen No results found for: ABSCRN   Gonnorhea No results found for: GCCX   Chlamydia No results found for: CLAMYDCU   RPR No results found for: RPR   Syphilis Antibody No results found for: SYPHILIS   Rubella No results found for: RUBELLAIGGIN   Hepatitis B Surface Antigen No results found for: HEPBSAG   HIV-1 Antibody No results found for: LABHIV1   Hepatitis C Antibody No results found for: HEPCAB   Rapid Urin Drug Screen No results found for: AMPMETHU,  BARBITSCNUR, LABBENZSCN, LABMETHSCN, LABOPIASCN, THCURSCR, COCAINEUR, AMPHETSCREEN, PROPOXSCN, BUPRENORSCNU, METAMPSCNUR, OXYCODONESCN, TRICYCLICSCN   Group B Strep Culture No results found for: GBSANTIGEN           External Prenatal Results     Pregnancy Outside Results - Transcribed From Office Records - See Scanned Records For Details     Test Value Date Time    Hgb 10.8 g/dL 19 1333    Hct 33.2 % 19 1333    ABO O  19    Rh Positive  19    Antibody Screen Negative  19    Glucose Fasting GTT       Glucose Tolerance Test 1 hour       Glucose Tolerance Test 3 hour       Gonorrhea (discrete) negative  19     Chlamydia (discrete) negative  19     RPR Non-Reactive  19 1436    VDRL       Syphilis Antibody       Rubella Positive  19 1436    HBsAg Non-Reactive  19 1436    Herpes Simplex Virus PCR       Herpes Simplex VIrus Culture       HIV Non-Reactive  19 1436    Hep C RNA Quant PCR       Hep C Antibody Non-Reactive  19 1436    AFP       Group B Strep       GBS Susceptibility to Clindamycin       GBS Susceptibility to Erythromycin       Fetal Fibronectin       Genetic Testing, Maternal Blood             Drug Screening     Test Value Date Time    Urine Drug Screen       Amphetamine Screen Negative  19 1436    Barbiturate Screen Negative  19 1436    Benzodiazepine Screen Negative  19 1436    Methadone Screen Negative  19 1436    Phencyclidine Screen Negative  19 1436    Opiates Screen Negative  19 1436    THC Screen Negative  19 1436    Cocaine Screen       Propoxyphene Screen Negative  19 1436    Buprenorphine Screen Negative  19 1436    Methamphetamine Screen       Oxycodone Screen Negative  19 1436    Tricyclic Antidepressants Screen Negative  19 1436                  Past OB History:       Obstetric History       T0      L1     SAB1   TAB0   Ectopic0    Molar0   Multiple0   Live Births1       # Outcome Date GA Lbr Alan/2nd Weight Sex Delivery Anes PTL Lv   3 Current            2  04/10/18 30w1d  940 g (2 lb 1.2 oz) F CS-LTranv Spinal N JO ANN      Name: Reva      Complications: Preeclampsia complicating hypertension      Apgar1:  7                Apgar5: 8   1 SAB 2017 6w0d             Obstetric Comments   2018 - Reva       Past Medical History: Past Medical History:   Diagnosis Date   • Anxiety    • Attention deficit hyperactivity disorder (ADHD), combined type     Off meds after 1-2 years   • Depression    • Gestational hypertension        • History of scarlet fever    • Hypothyroid     Resolved after ~ 3 years of Rxs   • Influenza A 2019   • Mirena 2018    Placed on 2018 - came out with tampon   • Pelvic adhesive disease 2016   • Postpartum thyroiditis 2018      Past Surgical History Past Surgical History:   Procedure Laterality Date   •  SECTION N/A 4/10/2018    Procedure: Primary LTCS (1 layer closure)  Surgeon: Jamal Almeida MD;     • HAND DEBRIDEMENT Right 2019    After a dog bite   • LAPAROSCOPIC APPENDECTOMY     • LAPAROSCOPIC LYSIS OF ADHESIONS  2016    Findings consistent with chronic PID   • NASAL SEPTUM SURGERY     • MA CATH/INJECT HYSTEROSALPINGOGRAM  2017     Uterus normal; right adnexa normal; left tube did not demonstrate fill or spill   • TONSILLECTOMY AND ADENOIDECTOMY        Family History: Family History   Problem Relation Age of Onset   • Coronary artery disease Paternal Grandmother    • Diabetes Paternal Grandmother    • Hypertension Paternal Grandmother       Social History:  reports that she quit smoking about 2 years ago. Her smoking use included cigarettes. She started smoking about 7 years ago. She smoked 0.25 packs per day. She has never used smokeless tobacco.   reports that she does not drink alcohol.   reports that she does not use drugs.                    General ROS Negative Findings:Headaches, Visual Changes, Epigastric pain, Anorexia, Nausia/Vomiting and ROM    ROS     All other systems have been reviewed and are neg  Objective       Vital Signs Range for the last 24 hours  Temperature: Temp:  [99.1 °F (37.3 °C)] 99.1 °F (37.3 °C)   Temp Source: Temp src: Oral   BP: BP: (107-126)/(76-77) 107/77   Pulse: Heart Rate:  [87] 87   Respirations: Resp:  [18] 18   SPO2:     O2 Amount (l/min):     O2 Devices     Weight: Weight:  [88.5 kg (195 lb)] 88.5 kg (195 lb)     Physical Examination:   General:   alert, appears stated age and cooperative   Skin:   normal   HEENT:  Clear clear   Lungs:   clear to auscultation bilaterally   Heart:   regular rate and rhythm, S1, S2 normal, no murmur, click, rub or gallop   Abdomen:  Soft, gravid uterus, no guarding no rebound benign exam   Lower Extremities  no edema, no calf tenderness   Pelvis:  External genitalia: normal general appearance  Uterus: enlarged         Presentation: vtx   Cervix: Exam by:     Dilation:  closed   Effacement:  30   Station:  ne                                            No blood on glove noted    Fetal Heart Rate Assessment   Method: Fetal HR Assessment Method: external   Beats/min: Fetal HR (beats/min): 130   Baseline: Fetal Heart Baseline Rate: normal range   Varibility: Fetal HR Variability: moderate (amplitude range 6 to 25 bpm)   Accels: Fetal HR Accelerations: greater than/equal to 15 bpm, lasting at least 15 seconds   Decels: Fetal HR Decelerations: absent   Tracing Category:       Uterine Assessment   Method: Method: external tocotransducer   Frequency (min): Contraction Frequency (Minutes): 1-2   Ctx Count in 10 min:     Duration:     Intensity: Contraction Intensity: mild by palpation   Intensity by IUPC:     Resting Tone: Uterine Resting Tone: soft by palpation   Resting Tone by IUPC:     [place] Units:       Laboratory Results:   Lab Results (last 24 hours)     ** No results  found for the last 24 hours. **        Radiology Review:   Imaging Results (Last 24 Hours)     ** No results found for the last 24 hours. **        Other Studies:    Assessment/Plan       High-risk pregnancy in third trimester    Previous LTCS (1 layer closure) - repeat C/S and BTL    Declines     Pregnancy        Assessment:  1.  Intrauterine pregnancy at 34w0d weeks gestation with reactive fetal status.    2.   contractions  without evidence of  labor  3.  Vaginal spotting today  4.  History of second trimester subchorionic hemorrhage  5.  Status post steroids fetal lung which enhancement on  and 19  6.  History of low transverse  at 30 weeks secondary to severe preeclampsia  7.  Plan to repeat   Plan:  1. OBS, IV hydration, labs, n.p.o., will consider steroids if continues to contract. Will Not use magnesium sulfate tocolysis  2. Plan of care has been reviewed with patient.  3.  Risks, benefits of treatment plan have been discussed.  4.  All questions have been answered.  5 discussed with Dr. Livan Alfaro DO  2019  6:45 PM

## 2019-11-14 VITALS
HEART RATE: 80 BPM | SYSTOLIC BLOOD PRESSURE: 102 MMHG | RESPIRATION RATE: 18 BRPM | BODY MASS INDEX: 35.88 KG/M2 | TEMPERATURE: 98.3 F | HEIGHT: 62 IN | WEIGHT: 195 LBS | DIASTOLIC BLOOD PRESSURE: 63 MMHG

## 2019-11-14 PROCEDURE — 25010000002 BETAMETHASONE ACET & SOD PHOS PER 4 MG: Performed by: OBSTETRICS & GYNECOLOGY

## 2019-11-14 PROCEDURE — 99213 OFFICE O/P EST LOW 20 MIN: CPT | Performed by: OBSTETRICS & GYNECOLOGY

## 2019-11-14 PROCEDURE — 96361 HYDRATE IV INFUSION ADD-ON: CPT

## 2019-11-14 PROCEDURE — 25010000002 PROMETHAZINE PER 50 MG: Performed by: OBSTETRICS & GYNECOLOGY

## 2019-11-14 PROCEDURE — 96376 TX/PRO/DX INJ SAME DRUG ADON: CPT

## 2019-11-14 PROCEDURE — 25010000002 MORPHINE PER 10 MG: Performed by: OBSTETRICS & GYNECOLOGY

## 2019-11-14 PROCEDURE — 96372 THER/PROPH/DIAG INJ SC/IM: CPT

## 2019-11-14 PROCEDURE — 59025 FETAL NON-STRESS TEST: CPT

## 2019-11-14 RX ORDER — NIFEDIPINE 10 MG/1
10 CAPSULE ORAL ONCE
Status: COMPLETED | OUTPATIENT
Start: 2019-11-14 | End: 2019-11-14

## 2019-11-14 RX ORDER — ACETAMINOPHEN 500 MG
1000 TABLET ORAL EVERY 6 HOURS PRN
Status: DISCONTINUED | OUTPATIENT
Start: 2019-11-14 | End: 2019-11-14 | Stop reason: HOSPADM

## 2019-11-14 RX ADMIN — BETAMETHASONE SODIUM PHOSPHATE AND BETAMETHASONE ACETATE 12 MG: 3; 3 INJECTION, SUSPENSION INTRA-ARTICULAR; INTRALESIONAL; INTRAMUSCULAR at 21:11

## 2019-11-14 RX ADMIN — NIFEDIPINE 10 MG: 10 CAPSULE ORAL at 05:53

## 2019-11-14 RX ADMIN — PROMETHAZINE HYDROCHLORIDE 12.5 MG: 25 INJECTION INTRAMUSCULAR; INTRAVENOUS at 05:19

## 2019-11-14 RX ADMIN — MORPHINE SULFATE 4 MG: 4 INJECTION, SOLUTION INTRAMUSCULAR; INTRAVENOUS at 05:19

## 2019-11-14 RX ADMIN — SODIUM CHLORIDE, POTASSIUM CHLORIDE, SODIUM LACTATE AND CALCIUM CHLORIDE 100 ML/HR: 600; 310; 30; 20 INJECTION, SOLUTION INTRAVENOUS at 05:24

## 2019-11-14 NOTE — PROGRESS NOTES
Continued Stay Note  [facility]     Patient Name: Tatianna Diane  MRN: [id number]  Today's Date: 11/14/2019    Admit Date: 11/13/2019    Discharge Plan     Row Name 11/14/19 1254       Plan    Plan Comments  SW spoke with pt at bedside. Pt explained that she has a 19 month old daughter, Reva, at home as well as her . Pt reports she has a lot of family support. Pt explained that she and her  hadn't planned on having another child so close together, however they are ready and excited to meet their baby when she is ready. Pt explained that she came into the hospital after having spotting and cramping/contractions so she wanted to make sure everything was okay. Pt was completely appropriate and SW had no concerns at this time. Pt reports she has support and everything she needs at home for her and her 's second child. SW had no  concerns at this time.         Discharge Codes    No documentation.             RODNEY Alexis

## 2019-11-14 NOTE — PROGRESS NOTES
Anatoly Diane  : 1995  MRN: [id number]  CSN: [id number]    Hospital Day: 2    CC: hospital follow-up for spotting/bleeding    Antepartum Progress Note    Subjective   Continues to report pain but feels better as compared to admission  I spoken to the laborist who is on last evening as well as the nurse taking care of her today.  Both are suspicious that the contractions that here and it has been reporting are self-induced.  The nurse is actually seen her pushing on the tocomonitor when she was being observed.  Upon entering the room she abruptly stopped this behavior.  The laborist who was on last evening came to evaluate her for pain.  When she was distracted with conversation the pain was no longer present.     Objective     Min/max vitals past 24 hours:   Temp  Min: 98.3 °F (36.8 °C)  Max: 99.1 °F (37.3 °C)  BP  Min: 93/51  Max: 126/76  Pulse  Min: 68  Max: 93  Resp  Min: 14  Max: 18         General: well developed; well nourished  no acute distress   Heart: Not performed.   Lungs: breathing is unlabored   Abdomen: soft, non-tender; no masses  no umbilical or inguinal hernias are present  no hepato-splenomegaly   FHT's: reassuring and category 1   Cervix: was not checked.   Contractions: Contractions appear present on monitor but they have an atypical appearance more consistent with Valsalva or pressing on the tocometer and they do of actual contractions           Assessment   1. IUP at 34w1d  2. Probable false  labor.  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 Previous  section with planned repeat  section and tubal ligation  4. Prior history of preeclampsia without evidence of disease today     Plan   1. Complete steroid course  2. Reassess for possible discharge later today  3. Social service consult    Chacorta Romero MD  2019  9:24 AM

## 2019-11-15 ENCOUNTER — TELEPHONE (OUTPATIENT)
Dept: OBSTETRICS AND GYNECOLOGY | Facility: CLINIC | Age: 24
End: 2019-11-15

## 2019-11-15 ENCOUNTER — HOSPITAL ENCOUNTER (OUTPATIENT)
Facility: HOSPITAL | Age: 24
Discharge: HOME | End: 2019-11-15
Attending: OBSTETRICS & GYNECOLOGY | Admitting: OBSTETRICS & GYNECOLOGY

## 2019-11-15 VITALS
BODY MASS INDEX: 35.88 KG/M2 | SYSTOLIC BLOOD PRESSURE: 122 MMHG | HEIGHT: 62 IN | TEMPERATURE: 98.4 F | RESPIRATION RATE: 16 BRPM | DIASTOLIC BLOOD PRESSURE: 76 MMHG | HEART RATE: 95 BPM | WEIGHT: 195 LBS

## 2019-11-15 LAB
DEPRECATED RDW RBC AUTO: 38.2 FL (ref 37–54)
ERYTHROCYTE [DISTWIDTH] IN BLOOD BY AUTOMATED COUNT: 11.7 % (ref 12.3–15.4)
FIBRINOGEN PPP-MCNC: 484 MG/DL (ref 220–400)
HCT VFR BLD AUTO: 32.3 % (ref 34–46.6)
HGB BLD-MCNC: 10.3 G/DL (ref 12–15.9)
MCH RBC QN AUTO: 28.5 PG (ref 26.6–33)
MCHC RBC AUTO-ENTMCNC: 31.9 G/DL (ref 31.5–35.7)
MCV RBC AUTO: 89.5 FL (ref 79–97)
PLATELET # BLD AUTO: 218 10*3/MM3 (ref 140–450)
PMV BLD AUTO: 12.4 FL (ref 6–12)
RBC # BLD AUTO: 3.61 10*6/MM3 (ref 3.77–5.28)
WBC NRBC COR # BLD: 13.21 10*3/MM3 (ref 3.4–10.8)

## 2019-11-15 PROCEDURE — 59025 FETAL NON-STRESS TEST: CPT | Performed by: OBSTETRICS & GYNECOLOGY

## 2019-11-15 PROCEDURE — 59025 FETAL NON-STRESS TEST: CPT

## 2019-11-15 PROCEDURE — 99213 OFFICE O/P EST LOW 20 MIN: CPT | Performed by: OBSTETRICS & GYNECOLOGY

## 2019-11-15 PROCEDURE — 85384 FIBRINOGEN ACTIVITY: CPT | Performed by: OBSTETRICS & GYNECOLOGY

## 2019-11-15 PROCEDURE — 36415 COLL VENOUS BLD VENIPUNCTURE: CPT | Performed by: OBSTETRICS & GYNECOLOGY

## 2019-11-15 PROCEDURE — G0463 HOSPITAL OUTPT CLINIC VISIT: HCPCS

## 2019-11-15 PROCEDURE — 85027 COMPLETE CBC AUTOMATED: CPT | Performed by: OBSTETRICS & GYNECOLOGY

## 2019-11-15 NOTE — H&P
"[facility]  Obstetric History and Physical    Chief Complaint   Patient presents with   • Laboring     VAGINAL BLEEDING       Subjective     Patient is a 23 y.o. female  currently at 34w2d, who presents with complaints of vaginal bleeding associated with back and lower abdominal pain.  Per patient's history, she reports that she sneezed and noted a sudden \"gush\" of dark vaginal blood, enough to saturate a pad.  She denies any subsequent leakage of fluid.  She reports irregular contractions.  She states that earlier in her pregnancy, she was noted to have a subclinical abruption.  However, with last ultrasound performed 2 days ago by Dr. Romero, there was no evidence of a retroplacental clot.  The patient does have a prior history of  delivery secondary to preeclampsia at 30 weeks gestation with her last pregnancy.  The patient denies having intercourse recently on my questioning.  However, Dr. Romero mentions that the patient had told him that she last had intercourse on Monday.  She denies fever/chills, nausea/vomiting, urinary or bowel symptoms.    Her prenatal care is notable for history of previous  section.  She also had an earlier episode of antepartum bleeding during this pregnancy.  Her prior pregnancy history is notable for  section at 30 weeks gestation with her last pregnancy.   delivery was secondary to preeclampsia.      The following portions of the patients history were reviewed and updated as appropriate: current medications, allergies, past medical history, past surgical history, past family history, past social history and problem list .        Prenatal Information:   Maternal Prenatal Labs  Blood Type ABO Type   Date Value Ref Range Status   2019 O  Final      Rh Status RH type   Date Value Ref Range Status   2019 Positive  Final      Antibody Screen Antibody Screen   Date Value Ref Range Status   2019 Negative  Final      Gonnorhea No " results found for: GCCX   Chlamydia No results found for: CLAMYDCU   RPR No results found for: RPR   Syphilis Antibody No results found for: SYPHILIS   Rubella No results found for: RUBELLAIGGIN   Hepatitis B Surface Antigen No results found for: HEPBSAG   HIV-1 Antibody No results found for: LABHIV1   Hepatitis C Antibody No results found for: HEPCAB   Rapid Urin Drug Screen No results found for: AMPMETHU, BARBITSCNUR, LABBENZSCN, LABMETHSCN, LABOPIASCN, THCURSCR, COCAINEUR, AMPHETSCREEN, PROPOXSCN, BUPRENORSCNU, METAMPSCNUR, OXYCODONESCN, TRICYCLICSCN   Group B Strep Culture No results found for: GBSANTIGEN           External Prenatal Results     Pregnancy Outside Results - Transcribed From Office Records - See Scanned Records For Details     Test Value Date Time    Hgb 11.0 g/dL 11/13/19 1855    Hct 34.4 % 11/13/19 1855    ABO O  11/13/19 1856    Rh Positive  11/13/19 1856    Antibody Screen Negative  11/13/19 1856    Glucose Fasting GTT       Glucose Tolerance Test 1 hour       Glucose Tolerance Test 3 hour       Gonorrhea (discrete) negative  06/17/19     Chlamydia (discrete) negative  06/17/19     RPR Non-Reactive  05/06/19 1436    VDRL       Syphilis Antibody       Rubella Positive  05/06/19 1436    HBsAg Non-Reactive  05/06/19 1436    Herpes Simplex Virus PCR       Herpes Simplex VIrus Culture       HIV Non-Reactive  05/06/19 1436    Hep C RNA Quant PCR       Hep C Antibody Non-Reactive  05/06/19 1436    AFP       Group B Strep       GBS Susceptibility to Clindamycin       GBS Susceptibility to Erythromycin       Fetal Fibronectin       Genetic Testing, Maternal Blood             Drug Screening     Test Value Date Time    Urine Drug Screen       Amphetamine Screen Negative  05/06/19 1436    Barbiturate Screen Negative  05/06/19 1436    Benzodiazepine Screen Negative  05/06/19 1436    Methadone Screen Negative  05/06/19 1436    Phencyclidine Screen Negative  05/06/19 1436    Opiates Screen Negative  05/06/19  1436    THC Screen Negative  19 1436    Cocaine Screen       Propoxyphene Screen Negative  19 1436    Buprenorphine Screen Negative  19 1436    Methamphetamine Screen       Oxycodone Screen Negative  19 1436    Tricyclic Antidepressants Screen Negative  19 1436                  Past OB History:       Obstetric History       T0      L1     SAB1   TAB0   Ectopic0   Molar0   Multiple0   Live Births1       # Outcome Date GA Lbr Alan/2nd Weight Sex Delivery Anes PTL Lv   3 Current            2  04/10/18 30w1d  940 g (2 lb 1.2 oz) F CS-LTranv Spinal N JO ANN      Name: Reva      Complications: Preeclampsia complicating hypertension      Apgar1:  7                Apgar5: 8   1 SAB 2017 6w0d             Obstetric Comments   2018 - Reva       Past Medical History:  Past Medical History:   Diagnosis Date   • Anxiety    • Attention deficit hyperactivity disorder (ADHD), combined type 2012    Off meds after 1-2 years   • Depression    • Gestational hypertension        • History of scarlet fever    • Hypothyroid     Resolved after ~ 3 years of Rxs   • Influenza A 2019   • Mirena 2018    Placed on 2018 - came out with tampon   • Pelvic adhesive disease 2016   • Postpartum thyroiditis 2018      Past Surgical History Past Surgical History:   Procedure Laterality Date   •  SECTION N/A 4/10/2018    Procedure: Primary LTCS (1 layer closure)  Surgeon: Jamal Almeida MD;     • HAND DEBRIDEMENT Right 2019    After a dog bite   • LAPAROSCOPIC APPENDECTOMY     • LAPAROSCOPIC LYSIS OF ADHESIONS  2016    Findings consistent with chronic PID   • NASAL SEPTUM SURGERY     • MA CATH/INJECT HYSTEROSALPINGOGRAM  2017     Uterus normal; right adnexa normal; left tube did not demonstrate fill or spill   • TONSILLECTOMY AND ADENOIDECTOMY        Family History: Family History   Problem Relation Age of Onset   • Coronary  artery disease Paternal Grandmother    • Diabetes Paternal Grandmother    • Hypertension Paternal Grandmother       Social History:  reports that she quit smoking about 2 years ago. Her smoking use included cigarettes. She started smoking about 7 years ago. She smoked 0.25 packs per day. She has never used smokeless tobacco.   reports that she does not drink alcohol.   reports that she does not use drugs.   Allergies: Tamiflu  [oseltamivir phosphate]  Current Medications:          Current Facility-Administered Medications on File Prior to Encounter   Medication Dose Route Frequency Provider Last Rate Last Dose   • [COMPLETED] betamethasone acetate-betamethasone sodium phosphate (CELESTONE SOLUSPAN) injection 12 mg  12 mg Intramuscular Q24H YURIDIA'Toño Farris MD   12 mg at 11/14/19 2111   • [DISCONTINUED] acetaminophen (TYLENOL) tablet 1,000 mg  1,000 mg Oral Q6H PRN Juan Pablo Alfaro, DO       • [DISCONTINUED] lidocaine PF 1% (XYLOCAINE) injection 5 mL  5 mL Intradermal PRN Toño Jack MD       • [DISCONTINUED] Morphine sulfate (PF) injection 4 mg  4 mg Intravenous Q4H PRN Toño Jack MD   4 mg at 11/14/19 0519   • [DISCONTINUED] ondansetron (ZOFRAN) injection 4 mg  4 mg Intravenous Q8H PRN Toño Jack MD       • [DISCONTINUED] promethazine (PHENERGAN) injection 12.5 mg  12.5 mg Intravenous Q4H PRN Toño Jack MD   12.5 mg at 11/14/19 0519   • [DISCONTINUED] sodium chloride 0.9 % flush 1-10 mL  1-10 mL Intravenous PRN Juan Pablo Alfaro, DO       • [DISCONTINUED] sodium chloride 0.9 % flush 10 mL  10 mL Intravenous PRN Toño Jack MD       • [DISCONTINUED] sodium chloride 0.9 % flush 3 mL  3 mL Intravenous Q12H Juan Pablo Alfaro, DO       • [DISCONTINUED] sodium chloride 0.9 % flush 3 mL  3 mL Intravenous Q12H Toño Jack MD         Current Outpatient Medications on File Prior to Encounter   Medication Sig Dispense Refill   • aspirin 81 MG tablet Take 1 tablet by mouth Daily.     •  Esomeprazole Magnesium (NEXIUM PO) Take  by mouth.     • ferrous sulfate 325 (65 FE) MG tablet Take 1 tablet by mouth 2 (Two) Times a Day Before Meals for 120 days. 60 tablet 3   • Prenatal Vit-Fe Fumarate-FA (PRENATAL 27-) 27-1 MG tablet tablet Take 1 tablet by mouth Daily.         General ROS: Pertinent items are noted in HPI, all other systems reviewed and negative    Objective       Vital Signs Range for the last 24 hours  Temperature: Temp:  [97.7 °F (36.5 °C)-98.4 °F (36.9 °C)] 98.4 °F (36.9 °C)   Temp Source: Temp src: Oral   BP: BP: (102-122)/(63-77) 122/76   Pulse: Heart Rate:  [80-95] 95   Respirations: Resp:  [16-18] 16   SPO2:     O2 Amount (l/min):     O2 Devices     Weight: Weight:  [88.5 kg (195 lb)] 88.5 kg (195 lb)     Physical Examination: General appearance - alert, well appearing, and in no distress, oriented to person, place, and time and normal appearing weight  Mental status - alert, oriented to person, place, and time, normal mood, behavior, speech, dress, motor activity, and thought processes  Eyes - pupils equal and reactive, extraocular eye movements intact, sclera anicteric  Mouth - mucous membranes moist, pharynx normal without lesions and dental hygiene good  Neck - supple, no significant adenopathy, thyroid exam: thyroid is normal in size without nodules or tenderness  Lymphatics - not examined  Heart - normal rate, regular rhythm, normal S1, S2, no murmurs, rubs, clicks or gallops  Abdomen-soft, nontender, nondistended, no masses or organomegaly   Abdomen, Non-Tender  Pelvic - VULVA: normal appearing vulva with no masses, tenderness or lesions, VAGINA: Sterile speculum examination performed.  Bloody mucoid discharge noted but no bright red blood seen.  No active bleeding noted., CERVIX: Closed/20%/-3 station.  The cervix remains posterior and medium consistency.  Neurological - alert, oriented, normal speech, no focal findings or movement disorder noted, DTR's normal and  symmetric  Extremities - no pedal edema noted    Fetal Heart Rate Assessment  Indication:   Start Time: 1033                end Time: 1445   NST Results: Reactive NST.  Baseline fetal heart rate 115-125 bpm.  Average variability with multiple 15 x 15 accelerations.  No decelerations.  On toco, there are multiple deflections associated with maternal Valsalva.  However, I do not note any regular uterine contraction pattern.        Laboratory Results:   Lab Results   Component Value Date    ALKPHOS 155 (H) 11/13/2019    ALT <5 11/13/2019    AST 20 11/13/2019    CREATININE 0.69 11/13/2019    BILITOT 0.5 11/13/2019     (H) 11/13/2019    URICACID 3.7 11/13/2019       WBC   Date Value Ref Range Status   11/13/2019 12.49 (H) 3.40 - 10.80 10*3/mm3 Final     RBC   Date Value Ref Range Status   11/13/2019 3.92 3.77 - 5.28 10*6/mm3 Final     Hemoglobin   Date Value Ref Range Status   11/13/2019 11.0 (L) 12.0 - 15.9 g/dL Final     Hematocrit   Date Value Ref Range Status   11/13/2019 34.4 34.0 - 46.6 % Final     MCV   Date Value Ref Range Status   11/13/2019 87.8 79.0 - 97.0 fL Final     MCH   Date Value Ref Range Status   11/13/2019 28.1 26.6 - 33.0 pg Final     MCHC   Date Value Ref Range Status   11/13/2019 32.0 31.5 - 35.7 g/dL Final     RDW   Date Value Ref Range Status   11/13/2019 11.6 (L) 12.3 - 15.4 % Final     RDW-SD   Date Value Ref Range Status   11/13/2019 37.3 37.0 - 54.0 fl Final     MPV   Date Value Ref Range Status   11/13/2019 12.1 (H) 6.0 - 12.0 fL Final     Platelets   Date Value Ref Range Status   11/13/2019 225 140 - 450 10*3/mm3 Final       Results from last 7 days   Lab Units 11/13/19  1856   ABO TYPING  O   RH TYPING  Positive   ANTIBODY SCREEN  Negative       Brief Urine Lab Results  (Last result in the past 365 days)      Color   Clarity   Blood   Leuk Est   Nitrite   Protein   CREAT   Urine HCG        11/13/19 1931 Yellow Clear Negative Negative Negative Negative                 Radiology  "Review: Patient  underwent transabdominal ultrasound 2 days ago performed by Dr. Romero.  He relates that there was no evidence of retroplacental clot noted.  Other Studies: Unremarkable urine noted 2019.  CBC and fibrinogen are reviewed and are normal.    Assessment/Plan       * No active hospital problems. *        Assessment:  1. Complaints of antepartum vaginal bleeding.  No evidence of ongoing active bleeding.  2. Previous  section.    Plan:  1. Discussed with Dr. Romero.  2. Patient seen by Dr. Romero.  Patient discharged home.  3. Reviewed signs/symptoms of  labor.  Patient to keep next regularly scheduled OB office visit.     Total time spent today with Tatianna  was 20 minutes (level 3).  Of this time, > 50% was spent face-to-face time coordinating care, answering her questions and counseling regarding pathophysiology of her presenting problem along with plans for any diagnostic work-up and treatment.        Pipe Russell \"Zhanna\" FELIX Connelly MD  11/15/2019  2:40 PM    "

## 2019-11-15 NOTE — PROGRESS NOTES
Anatoly Diane  : 1995  MRN: [id number]  CSN: [id number]    Hospital Day: 2    CC: hospital follow-up for contractions    Antepartum Progress Note    Subjective   Continues to contract at times. No more bleeding     Objective     Min/max vitals past 24 hours:   Temp  Min: 97.7 °F (36.5 °C)  Max: 98.5 °F (36.9 °C)  BP  Min: 93/51  Max: 118/71  Pulse  Min: 68  Max: 104  Resp  Min: 14  Max: 18         General: well developed; well nourished  no acute distress   Heart: Not performed.   Lungs: breathing is unlabored   Abdomen: soft, non-tender; no masses  no umbilical or inguinal hernias are present  no hepato-splenomegaly   FHT's: reassuring and category 1   Cervix: was not checked.   Contractions: irregular           Assessment   1. IUP at 34w1d  2. Threatened PTL  3. Previous C/S with planned repeat     Plan   1. Home after BMZ #2 completed  2. Pelvic rest emphasized  3. Keep 1 week follow-up    Chacorta Romero MD  2019  7:03 PM

## 2019-11-15 NOTE — PROGRESS NOTES
Anatoly Diane  : 1995  MRN: [id number]  CSN: [id number]    Hospital Day: 1    CC: hospital follow-up for contractions    Antepartum Progress Note    Subjective   Has had no more new bright red bleeding.  Contractions seem less worrisome and painful than earlier     Objective     Min/max vitals past 24 hours:   Temp  Min: 98.3 °F (36.8 °C)  Max: 98.4 °F (36.9 °C)  BP  Min: 102/63  Max: 122/76  Pulse  Min: 80  Max: 95  Resp  Min: 16  Max: 18         General: well developed; well nourished  no acute distress   Heart: Not performed.   Lungs: breathing is unlabored   Abdomen: soft, non-tender; no masses  no umbilical or inguinal hernias are present  no hepato-splenomegaly   FHT's: reassuring and category 1   Cervix: was not checked.   Contractions: none           Assessment   1. IUP at 34w2d with threatened  labor.  Cannot exclude small placental separation is etiology.  Anticipate the blood reported more relates to intercourse that happened several days ago.  Previously seen contraction pattern highly suspicious for pseudo-contractions  2. Previous  section with planned repeat  section  3. History of preeclampsia in prior pregnancy without evidence of recurrent disease     Plan   1. Discharge to home with continue pelvic rest  2. Discontinue baby aspirin  3. Return in the presence of symptoms consistent with progression.  Specifically talked about need to be reevaluated in the presence of diminished fetal movement, new onset bright red vaginal bleeding or significant uterine activity    Chacorta Romero MD  11/15/2019  4:46 PM

## 2019-11-15 NOTE — NURSING NOTE
Pt c/o cramping and having back pain. She states she is having pink tinged discharge upon wiping. She says she is feeling pressure. toco changed out, contractions look like she is holding her breath and trying to have contractions. She is uncomfortable

## 2019-11-15 NOTE — TELEPHONE ENCOUNTER
maile    Ob says she sneezed and started bleeding more this time than she was the other day. I went to look for dr gr but he was in a room, then carmelo came out of a room and I asked her and she said to go to labor and delivery. Pt was ok with that decision.

## 2019-11-15 NOTE — DISCHARGE SUMMARY
Anatoly Diane  : 1995  MRN: [id number]  CSN: [id number]    Discharge Summary    A formal discharge summary was not needed because this admission was for an observation visit.    Discharge Date: 2019   Discharge Dx:    1. Threatened PTL @ 34w1d   Disposition: home   Condition at discharge: stable   Follow up: 1 week         This note has been electronically signed.    Chacorta Romero MD

## 2019-11-18 ENCOUNTER — HOSPITAL ENCOUNTER (OUTPATIENT)
Facility: HOSPITAL | Age: 24
Setting detail: OBSERVATION
Discharge: HOME | End: 2019-11-18
Attending: OBSTETRICS & GYNECOLOGY | Admitting: OBSTETRICS & GYNECOLOGY

## 2019-11-18 VITALS
WEIGHT: 195 LBS | HEART RATE: 77 BPM | TEMPERATURE: 98 F | HEIGHT: 62 IN | SYSTOLIC BLOOD PRESSURE: 107 MMHG | RESPIRATION RATE: 16 BRPM | DIASTOLIC BLOOD PRESSURE: 74 MMHG | BODY MASS INDEX: 35.88 KG/M2

## 2019-11-18 PROBLEM — O46.93 VAGINAL BLEEDING IN PREGNANCY, THIRD TRIMESTER: Status: ACTIVE | Noted: 2019-11-18

## 2019-11-18 LAB
ALP SERPL-CCNC: 146 U/L (ref 39–117)
ALT SERPL W P-5'-P-CCNC: 17 U/L (ref 1–33)
AST SERPL-CCNC: 17 U/L (ref 1–32)
BILIRUB SERPL-MCNC: 0.4 MG/DL (ref 0.2–1.2)
BILIRUB UR QL STRIP: NEGATIVE
CLARITY UR: CLEAR
COLOR UR: YELLOW
CREAT BLD-MCNC: 0.71 MG/DL (ref 0.57–1)
DEPRECATED RDW RBC AUTO: 37.3 FL (ref 37–54)
ERYTHROCYTE [DISTWIDTH] IN BLOOD BY AUTOMATED COUNT: 11.7 % (ref 12.3–15.4)
GLUCOSE UR STRIP-MCNC: NEGATIVE MG/DL
HCT VFR BLD AUTO: 32.1 % (ref 34–46.6)
HGB BLD-MCNC: 10.1 G/DL (ref 12–15.9)
HGB UR QL STRIP.AUTO: NEGATIVE
KETONES UR QL STRIP: NEGATIVE
LDH SERPL-CCNC: 326 U/L (ref 135–214)
LEUKOCYTE ESTERASE UR QL STRIP.AUTO: NEGATIVE
MCH RBC QN AUTO: 27.7 PG (ref 26.6–33)
MCHC RBC AUTO-ENTMCNC: 31.5 G/DL (ref 31.5–35.7)
MCV RBC AUTO: 88.2 FL (ref 79–97)
NITRITE UR QL STRIP: NEGATIVE
PH UR STRIP.AUTO: 7 [PH] (ref 5–8)
PLATELET # BLD AUTO: 228 10*3/MM3 (ref 140–450)
PMV BLD AUTO: 12.2 FL (ref 6–12)
PROT UR QL STRIP: NEGATIVE
RBC # BLD AUTO: 3.64 10*6/MM3 (ref 3.77–5.28)
SP GR UR STRIP: <=1.005 (ref 1–1.03)
URATE SERPL-MCNC: 3.2 MG/DL (ref 2.4–5.7)
UROBILINOGEN UR QL STRIP: NORMAL
WBC NRBC COR # BLD: 8.15 10*3/MM3 (ref 3.4–10.8)

## 2019-11-18 PROCEDURE — 84460 ALANINE AMINO (ALT) (SGPT): CPT | Performed by: OBSTETRICS & GYNECOLOGY

## 2019-11-18 PROCEDURE — 82565 ASSAY OF CREATININE: CPT | Performed by: OBSTETRICS & GYNECOLOGY

## 2019-11-18 PROCEDURE — 85027 COMPLETE CBC AUTOMATED: CPT | Performed by: OBSTETRICS & GYNECOLOGY

## 2019-11-18 PROCEDURE — 83615 LACTATE (LD) (LDH) ENZYME: CPT | Performed by: OBSTETRICS & GYNECOLOGY

## 2019-11-18 PROCEDURE — 84075 ASSAY ALKALINE PHOSPHATASE: CPT | Performed by: OBSTETRICS & GYNECOLOGY

## 2019-11-18 PROCEDURE — 36415 COLL VENOUS BLD VENIPUNCTURE: CPT | Performed by: OBSTETRICS & GYNECOLOGY

## 2019-11-18 PROCEDURE — 81003 URINALYSIS AUTO W/O SCOPE: CPT | Performed by: OBSTETRICS & GYNECOLOGY

## 2019-11-18 PROCEDURE — G0378 HOSPITAL OBSERVATION PER HR: HCPCS

## 2019-11-18 PROCEDURE — 84550 ASSAY OF BLOOD/URIC ACID: CPT | Performed by: OBSTETRICS & GYNECOLOGY

## 2019-11-18 PROCEDURE — 87186 SC STD MICRODIL/AGAR DIL: CPT | Performed by: OBSTETRICS & GYNECOLOGY

## 2019-11-18 PROCEDURE — 82247 BILIRUBIN TOTAL: CPT | Performed by: OBSTETRICS & GYNECOLOGY

## 2019-11-18 PROCEDURE — 99218 PR INITIAL OBSERVATION CARE/DAY 30 MINUTES: CPT | Performed by: OBSTETRICS & GYNECOLOGY

## 2019-11-18 PROCEDURE — 59025 FETAL NON-STRESS TEST: CPT

## 2019-11-18 PROCEDURE — 87077 CULTURE AEROBIC IDENTIFY: CPT | Performed by: OBSTETRICS & GYNECOLOGY

## 2019-11-18 PROCEDURE — 87086 URINE CULTURE/COLONY COUNT: CPT | Performed by: OBSTETRICS & GYNECOLOGY

## 2019-11-18 PROCEDURE — 84450 TRANSFERASE (AST) (SGOT): CPT | Performed by: OBSTETRICS & GYNECOLOGY

## 2019-11-18 NOTE — NURSING NOTE
Patient left ambulatory home @ 0208 with discharge instructions, verbalized understanding and denies questions.

## 2019-11-18 NOTE — H&P
"Tatianna Diane  1995  [id number]  [id number]    CC: lost mucus plug  HPI:  Patient is 23 y.o. female   currently at 34w5d presents with c/o passing mucus plug tonight, blood streaks present, still sees blood only with wiping.  Pt also with uterine contractions, intermittent, rates 5/10, radiates to back.  PNC comp by prev , prev  delivery, and hx preeclampsia.     PMH:  Current meds: PNV, nexium  Illnesses: exercise-induced asthma (resolved), hypothyroidism (resolved), GERD, migraines,   Scarlet fever  Surgeries: , lap appy, T and A, nose surgery, hand surg, diag laparoscopy with RADHA  Allergies: tamiflu- N,V    Past OB History:       Obstetric History       T0      L1     SAB1   TAB0   Ectopic0   Molar0   Multiple0   Live Births1       # Outcome Date GA Lbr Alan/2nd Weight Sex Delivery Anes PTL Lv   3 Current            2  04/10/18 30w1d  940 g (2 lb 1.2 oz) F CS-LTranv Spinal N JO ANN      Name: Reva      Complications: Preeclampsia complicating hypertension      Apgar1:  7                Apgar5: 8   1 SAB 2017 6w0d             Obstetric Comments   2018 - Reva            SH: tob neg , EtOH neg, drugs neg  FH: heart dz pos , diabetes pos , cancer neg    General ROS: HA, contractions, N, edema.   All other systems reviewed and are negative.      Physical Examination: General appearance - alert, well appearing, and in no distress  Vital signs - BP 92/55   Pulse 90   Temp 98 °F (36.7 °C) (Oral)   Resp 16   Ht 157.5 cm (62\")   Wt 88.5 kg (195 lb)   LMP 2019   BMI 35.67 kg/m²   HEENT: normocephalic, atraumatic,oropharynx clear, appearance of ears and nose normal  Neck - supple, no significant adenopathy, no thyromegaly  Lymphatics - no palpable lymphadenopathy in the neck or groin, no hepatosplenomegaly  Chest - clear to auscultation, no wheezes, rales or rhonchi, respiratory effort non-labored  Heart - normal rate, regular rhythm, no murmurs, " rubs, clicks or gallops, no JVD, no lower extremity edema  Abdomen - soft, nontender, nondistended, no masses, no hepatosplenomegaly  no rebound tenderness noted, bowel sounds normal  Vaginal Exam: cl/70%/-2, no blood in vault ,external genitalia normal  Extremities - no pedal edema noted, no calf tend  Skin -warm and dry, normal coloration and turgor, no rashes, no suspicious skin lesions noted      Fetal monitoring: indication bleeding , onset 0045 , offset 0144 , baseline 145 , mod BTB variability , multiple accels (15 X 15), no decels, no contractions, interpretation reactive NST    Radiology     Assessment 1)IUP 34 5/7 weeks   2)bleeding- with mucus plug, no active bleeding, no contractions, labs normal   3)prev - plans repeat   4)hx  delivery   5)hx preeclampsia prev preg   6)obesity    Plan 1)observe     2)home    3)keep next sched appt    Jorge Gorman MD  2019  1:55 AM

## 2019-11-19 RX ORDER — PENICILLIN V POTASSIUM 500 MG/1
500 TABLET ORAL 4 TIMES DAILY
Qty: 28 TABLET | Refills: 0 | Status: SHIPPED | OUTPATIENT
Start: 2019-11-19 | End: 2019-11-26

## 2019-11-20 LAB — BACTERIA SPEC AEROBE CULT: ABNORMAL

## 2019-12-04 ENCOUNTER — TELEPHONE (OUTPATIENT)
Dept: CARDIOLOGY | Facility: CLINIC | Age: 24
End: 2019-12-04

## 2019-12-05 DIAGNOSIS — R00.2 PALPITATIONS: Primary | ICD-10-CM

## 2021-11-10 NOTE — PROGRESS NOTES
IUD Insertion    No LMP recorded (lmp unknown).    Date of procedure:  5/30/2018    Risks and benefits discussed? yes  All questions answered? yes  Consents given by The patient  Written consent obtained? yes    Local anesthesia used:  no    Procedure documentation:    After verifying the patient had a low probability of being pregnant and met the criteria for insertion, a sterile speculum has placed and the cervix was cleansed with an antiseptic solution.  Vaginal discharge was scant.  The anterior lip of the cervix was grasped with a tenaculum and the uterine cavity was gently sounded. There was no difficulty passing the sound through the cervix.  Cervical dilation did not need to be performed prior to placing the IUD.  The uterus was retroverted and sounded to 8 cms.  The Mirena was then prepared per the manufacturers instructions.    The Mirena was advanced to a point 2 cms from the fundus and then the arms were released from the sheath.  The device was advanced to the fundus and the device was released fully from the sheath. The string was cut 2 cms in length.  Bleeding from the cervix was scant.    She tolerated the procedure without any difficulty.     Post procedure instructions: It was reviewed that the Mirena will not alter the timing of when she bleeds but it may decrease the quantity of flow and cramps.  Roughly 30% of people will be amenorrheic over time.  Efficacy rate of 99.2% over 5 years was discussed.  Spontaneous expulsion rate of 1-2% was also discussed.  If she has any issue with fever or excessive bleeding or pain she is to call the office immediately.  Otherwise I would like to see her back in 6 weeks with an ultrasound to confirm correct placement.    This note was electronically signed.    Chacorta Romero M.D.  May 30, 2018   Mom called and would like to speak to a nurse about a referral for a PCP.  Please call mom at [phone].
